# Patient Record
Sex: MALE | Race: WHITE | NOT HISPANIC OR LATINO | Employment: FULL TIME | URBAN - METROPOLITAN AREA
[De-identification: names, ages, dates, MRNs, and addresses within clinical notes are randomized per-mention and may not be internally consistent; named-entity substitution may affect disease eponyms.]

---

## 2017-03-09 DIAGNOSIS — E11.9 DIABETES MELLITUS TYPE 2, CONTROLLED, WITHOUT COMPLICATIONS (HCC): ICD-10-CM

## 2017-03-09 DIAGNOSIS — R79.89 LOW TESTOSTERONE: ICD-10-CM

## 2017-03-09 DIAGNOSIS — E78.5 DYSLIPIDEMIA: ICD-10-CM

## 2017-03-09 DIAGNOSIS — E79.0 HYPERURICEMIA: ICD-10-CM

## 2017-03-09 DIAGNOSIS — E55.9 VITAMIN D DEFICIENCY: ICD-10-CM

## 2017-03-09 DIAGNOSIS — I10 ESSENTIAL HYPERTENSION: ICD-10-CM

## 2017-03-09 RX ORDER — ERGOCALCIFEROL 1.25 MG/1
CAPSULE ORAL
Qty: 13 CAPSULE | Refills: 0 | Status: SHIPPED | OUTPATIENT
Start: 2017-03-09 | End: 2017-06-23 | Stop reason: SDUPTHER

## 2017-06-09 DIAGNOSIS — E55.9 VITAMIN D DEFICIENCY: ICD-10-CM

## 2017-06-09 DIAGNOSIS — I10 ESSENTIAL HYPERTENSION: ICD-10-CM

## 2017-06-09 DIAGNOSIS — E79.0 HYPERURICEMIA: ICD-10-CM

## 2017-06-09 DIAGNOSIS — E11.9 DIABETES MELLITUS TYPE 2, CONTROLLED, WITHOUT COMPLICATIONS (HCC): ICD-10-CM

## 2017-06-09 DIAGNOSIS — R79.89 LOW TESTOSTERONE: ICD-10-CM

## 2017-06-09 DIAGNOSIS — E78.5 DYSLIPIDEMIA: ICD-10-CM

## 2017-06-09 RX ORDER — ERGOCALCIFEROL 1.25 MG/1
CAPSULE ORAL
Qty: 13 CAPSULE | Refills: 0 | OUTPATIENT
Start: 2017-06-09

## 2017-06-23 DIAGNOSIS — I10 ESSENTIAL HYPERTENSION: ICD-10-CM

## 2017-06-23 DIAGNOSIS — E78.5 DYSLIPIDEMIA: ICD-10-CM

## 2017-06-23 DIAGNOSIS — E55.9 VITAMIN D DEFICIENCY: ICD-10-CM

## 2017-06-23 DIAGNOSIS — E11.9 DIABETES MELLITUS TYPE 2, CONTROLLED, WITHOUT COMPLICATIONS (HCC): ICD-10-CM

## 2017-06-23 DIAGNOSIS — R79.89 LOW TESTOSTERONE: ICD-10-CM

## 2017-06-23 DIAGNOSIS — E79.0 HYPERURICEMIA: ICD-10-CM

## 2017-06-24 RX ORDER — ERGOCALCIFEROL 1.25 MG/1
CAPSULE ORAL
Qty: 13 CAPSULE | Refills: 0 | Status: SHIPPED | OUTPATIENT
Start: 2017-06-24 | End: 2019-10-17

## 2017-11-30 ENCOUNTER — OFFICE VISIT (OUTPATIENT)
Dept: FAMILY MEDICINE CLINIC | Facility: CLINIC | Age: 57
End: 2017-11-30

## 2017-11-30 VITALS
HEIGHT: 70 IN | DIASTOLIC BLOOD PRESSURE: 80 MMHG | BODY MASS INDEX: 32.21 KG/M2 | HEART RATE: 92 BPM | RESPIRATION RATE: 18 BRPM | TEMPERATURE: 98 F | WEIGHT: 225 LBS | SYSTOLIC BLOOD PRESSURE: 132 MMHG

## 2017-11-30 DIAGNOSIS — Z79.4 CONTROLLED TYPE 2 DIABETES MELLITUS WITHOUT COMPLICATION, WITH LONG-TERM CURRENT USE OF INSULIN (HCC): ICD-10-CM

## 2017-11-30 DIAGNOSIS — B07.8 OTHER VIRAL WARTS: Primary | ICD-10-CM

## 2017-11-30 DIAGNOSIS — Z00.00 ANNUAL PHYSICAL EXAM: ICD-10-CM

## 2017-11-30 DIAGNOSIS — E11.9 CONTROLLED TYPE 2 DIABETES MELLITUS WITHOUT COMPLICATION, WITH LONG-TERM CURRENT USE OF INSULIN (HCC): ICD-10-CM

## 2017-11-30 PROCEDURE — 99213 OFFICE O/P EST LOW 20 MIN: CPT | Performed by: FAMILY MEDICINE

## 2017-11-30 NOTE — PROGRESS NOTES
"Subjective   Chemo Lin is a 57 y.o. male.     CC: Hand Nodules    History of Present Illness     Pt comes in today c/o bilateral hand nodules. These started 6 months ago or so and are spreading.  Ancillary, his endocrinologist wants me to order the January labs today so that they are back when he sees her.      The following portions of the patient's history were reviewed and updated as appropriate: allergies, current medications, past family history, past medical history, past social history, past surgical history and problem list.    Review of Systems   Constitutional: Negative for activity change, chills, fatigue and fever.   Respiratory: Negative for cough and shortness of breath.    Cardiovascular: Negative for chest pain and palpitations.   Gastrointestinal: Negative for abdominal pain.   Endocrine: Negative for cold intolerance.   Skin:        Skin lesions   Psychiatric/Behavioral: Negative for behavioral problems and dysphoric mood. The patient is not nervous/anxious.      /80  Pulse 92  Temp 98 °F (36.7 °C) (Oral)   Resp 18  Ht 70\" (177.8 cm)  Wt 225 lb (102 kg)  BMI 32.28 kg/m2    Objective   Physical Exam   Constitutional: He appears well-developed and well-nourished.   Neck: Neck supple. No thyromegaly present.   Cardiovascular: Normal rate and regular rhythm.    No murmur heard.  Pulmonary/Chest: Effort normal and breath sounds normal.   Abdominal: Bowel sounds are normal.   Skin:   Several verrucous lesions on the hands noted   Psychiatric: He has a normal mood and affect. His behavior is normal.   Nursing note and vitals reviewed.      Assessment/Plan   Chemo was seen today for hand pain, referreal to surgeon and requesting labs.    Diagnoses and all orders for this visit:    Other viral warts  -     Ambulatory Referral to Dermatology    Annual physical exam  Comments:  for labs only  Orders:  -     Comprehensive metabolic panel  -     Lipid panel  -     CBC and Differential  -  "    TSH  -     PSA    Controlled type 2 diabetes mellitus without complication, with long-term current use of insulin  -     Hemoglobin A1c  -     MicroAlbumin, Urine, Random - Urine, Clean Catch

## 2018-01-16 LAB
ALBUMIN SERPL-MCNC: 4.2 G/DL (ref 3.5–5.2)
ALBUMIN/GLOB SERPL: 1.6 G/DL
ALP SERPL-CCNC: 71 U/L (ref 39–117)
ALT SERPL-CCNC: 55 U/L (ref 1–41)
AST SERPL-CCNC: 34 U/L (ref 1–40)
BASOPHILS # BLD AUTO: 0.02 10*3/MM3 (ref 0–0.2)
BASOPHILS NFR BLD AUTO: 0.4 % (ref 0–1.5)
BILIRUB SERPL-MCNC: 0.7 MG/DL (ref 0.1–1.2)
BUN SERPL-MCNC: 21 MG/DL (ref 6–20)
BUN/CREAT SERPL: 18.1 (ref 7–25)
CALCIUM SERPL-MCNC: 9.3 MG/DL (ref 8.6–10.5)
CHLORIDE SERPL-SCNC: 103 MMOL/L (ref 98–107)
CHOLEST SERPL-MCNC: 191 MG/DL (ref 0–200)
CO2 SERPL-SCNC: 28.9 MMOL/L (ref 22–29)
CREAT SERPL-MCNC: 1.16 MG/DL (ref 0.76–1.27)
EOSINOPHIL # BLD AUTO: 0.26 10*3/MM3 (ref 0–0.7)
EOSINOPHIL NFR BLD AUTO: 4.6 % (ref 0.3–6.2)
ERYTHROCYTE [DISTWIDTH] IN BLOOD BY AUTOMATED COUNT: 13.7 % (ref 11.5–14.5)
GLOBULIN SER CALC-MCNC: 2.7 GM/DL
GLUCOSE SERPL-MCNC: 124 MG/DL (ref 65–99)
HBA1C MFR BLD: 7.3 % (ref 4.8–5.6)
HCT VFR BLD AUTO: 47.2 % (ref 40.4–52.2)
HDLC SERPL-MCNC: 39 MG/DL (ref 40–60)
HGB BLD-MCNC: 15.7 G/DL (ref 13.7–17.6)
IMM GRANULOCYTES # BLD: 0.02 10*3/MM3 (ref 0–0.03)
IMM GRANULOCYTES NFR BLD: 0.4 % (ref 0–0.5)
LDLC SERPL CALC-MCNC: 133 MG/DL (ref 0–100)
LYMPHOCYTES # BLD AUTO: 1.16 10*3/MM3 (ref 0.9–4.8)
LYMPHOCYTES NFR BLD AUTO: 20.4 % (ref 19.6–45.3)
MCH RBC QN AUTO: 31.3 PG (ref 27–32.7)
MCHC RBC AUTO-ENTMCNC: 33.3 G/DL (ref 32.6–36.4)
MCV RBC AUTO: 94 FL (ref 79.8–96.2)
MICROALBUMIN UR-MCNC: 3.7 UG/ML
MONOCYTES # BLD AUTO: 0.7 10*3/MM3 (ref 0.2–1.2)
MONOCYTES NFR BLD AUTO: 12.3 % (ref 5–12)
NEUTROPHILS # BLD AUTO: 3.53 10*3/MM3 (ref 1.9–8.1)
NEUTROPHILS NFR BLD AUTO: 61.9 % (ref 42.7–76)
PLATELET # BLD AUTO: 161 10*3/MM3 (ref 140–500)
POTASSIUM SERPL-SCNC: 4.1 MMOL/L (ref 3.5–5.2)
PROT SERPL-MCNC: 6.9 G/DL (ref 6–8.5)
PSA SERPL-MCNC: 0.58 NG/ML (ref 0–4)
RBC # BLD AUTO: 5.02 10*6/MM3 (ref 4.6–6)
SODIUM SERPL-SCNC: 143 MMOL/L (ref 136–145)
TRIGL SERPL-MCNC: 95 MG/DL (ref 0–150)
TSH SERPL DL<=0.005 MIU/L-ACNC: 2.25 MIU/ML (ref 0.27–4.2)
VLDLC SERPL CALC-MCNC: 19 MG/DL (ref 5–40)
WBC # BLD AUTO: 5.69 10*3/MM3 (ref 4.5–10.7)

## 2018-04-10 ENCOUNTER — OFFICE VISIT (OUTPATIENT)
Dept: FAMILY MEDICINE CLINIC | Facility: CLINIC | Age: 58
End: 2018-04-10

## 2018-04-10 VITALS
RESPIRATION RATE: 16 BRPM | TEMPERATURE: 97.4 F | DIASTOLIC BLOOD PRESSURE: 79 MMHG | HEART RATE: 75 BPM | BODY MASS INDEX: 31.08 KG/M2 | WEIGHT: 222 LBS | SYSTOLIC BLOOD PRESSURE: 138 MMHG | HEIGHT: 71 IN

## 2018-04-10 DIAGNOSIS — J01.00 ACUTE NON-RECURRENT MAXILLARY SINUSITIS: Primary | ICD-10-CM

## 2018-04-10 PROCEDURE — 99213 OFFICE O/P EST LOW 20 MIN: CPT | Performed by: FAMILY MEDICINE

## 2018-04-10 RX ORDER — AMOXICILLIN AND CLAVULANATE POTASSIUM 875; 125 MG/1; MG/1
1 TABLET, FILM COATED ORAL EVERY 12 HOURS SCHEDULED
Qty: 20 TABLET | Refills: 0 | Status: SHIPPED | OUTPATIENT
Start: 2018-04-10 | End: 2019-03-21

## 2018-04-10 NOTE — PROGRESS NOTES
"Subjective   Chemo Lin is a 57 y.o. male.     CC: URI    History of Present Illness     Chemo Lin 57 y.o. male who presents for evaluation of sinus pressure and congestion, cough. Symptoms include congestion and dry cough.  Onset of symptoms was 6 days ago, unchanged since that time. Patient denies shortness of breath, wheezing.   Evaluation to date: none Treatment to date:  none.       The following portions of the patient's history were reviewed and updated as appropriate: allergies, current medications, past family history, past medical history, past social history, past surgical history and problem list.    Review of Systems   Constitutional: Negative for activity change and fatigue.   Respiratory: Negative for chest tightness.    Cardiovascular: Negative for palpitations.   Gastrointestinal: Negative for abdominal pain and nausea.   Endocrine: Negative for cold intolerance and polydipsia.   Psychiatric/Behavioral: Negative for behavioral problems and dysphoric mood.   All other systems reviewed and are negative.    Blood pressure 138/79, pulse 75, temperature 97.4 °F (36.3 °C), temperature source Oral, resp. rate 16, height 180.3 cm (71\"), weight 101 kg (222 lb).      Objective   Physical Exam   Constitutional: He appears well-developed and well-nourished.   HENT:   Right Ear: Tympanic membrane normal.   Left Ear: Tympanic membrane normal.   Nose: Right sinus exhibits maxillary sinus tenderness. Left sinus exhibits maxillary sinus tenderness.   Mouth/Throat: Oropharynx is clear and moist. No oropharyngeal exudate, posterior oropharyngeal erythema or tonsillar abscesses.   Neck: Neck supple. No thyromegaly present.   Cardiovascular: Normal rate and regular rhythm.    No murmur heard.  Pulmonary/Chest: Effort normal and breath sounds normal.   Abdominal: Bowel sounds are normal.   Psychiatric: He has a normal mood and affect. His behavior is normal.   Nursing note and vitals " reviewed.      Assessment/Plan   Chemo was seen today for nasal congestion, sinusitis and cough.    Diagnoses and all orders for this visit:    Acute non-recurrent maxillary sinusitis  -     amoxicillin-clavulanate (AUGMENTIN) 875-125 MG per tablet; Take 1 tablet by mouth Every 12 (Twelve) Hours.

## 2018-04-27 ENCOUNTER — CLINICAL SUPPORT (OUTPATIENT)
Dept: FAMILY MEDICINE CLINIC | Facility: CLINIC | Age: 58
End: 2018-04-27

## 2018-04-27 DIAGNOSIS — Z23 NEED FOR HEPATITIS A VACCINATION: Primary | ICD-10-CM

## 2018-04-27 PROCEDURE — 90632 HEPA VACCINE ADULT IM: CPT | Performed by: INTERNAL MEDICINE

## 2018-04-27 PROCEDURE — 90471 IMMUNIZATION ADMIN: CPT | Performed by: INTERNAL MEDICINE

## 2018-11-02 ENCOUNTER — CLINICAL SUPPORT (OUTPATIENT)
Dept: FAMILY MEDICINE CLINIC | Facility: CLINIC | Age: 58
End: 2018-11-02

## 2018-11-02 DIAGNOSIS — Z23 NEED FOR HEPATITIS A VACCINATION: Primary | ICD-10-CM

## 2018-11-02 PROCEDURE — 90632 HEPA VACCINE ADULT IM: CPT | Performed by: INTERNAL MEDICINE

## 2018-11-02 PROCEDURE — 90471 IMMUNIZATION ADMIN: CPT | Performed by: INTERNAL MEDICINE

## 2019-03-21 ENCOUNTER — OFFICE VISIT (OUTPATIENT)
Dept: FAMILY MEDICINE CLINIC | Facility: CLINIC | Age: 59
End: 2019-03-21

## 2019-03-21 VITALS
WEIGHT: 224 LBS | SYSTOLIC BLOOD PRESSURE: 125 MMHG | HEART RATE: 90 BPM | BODY MASS INDEX: 31.36 KG/M2 | DIASTOLIC BLOOD PRESSURE: 77 MMHG | OXYGEN SATURATION: 96 % | HEIGHT: 71 IN | RESPIRATION RATE: 18 BRPM | TEMPERATURE: 98.3 F

## 2019-03-21 DIAGNOSIS — J06.9 ACUTE URI: Primary | ICD-10-CM

## 2019-03-21 PROBLEM — N52.9 ED (ERECTILE DYSFUNCTION): Status: ACTIVE | Noted: 2019-02-22

## 2019-03-21 PROCEDURE — 99213 OFFICE O/P EST LOW 20 MIN: CPT | Performed by: FAMILY MEDICINE

## 2019-03-21 RX ORDER — VALSARTAN 40 MG/1
TABLET ORAL
COMMUNITY
Start: 2019-02-04

## 2019-03-21 RX ORDER — INSULIN DEGLUDEC INJECTION 100 U/ML
INJECTION, SOLUTION SUBCUTANEOUS
Refills: 0 | COMMUNITY
Start: 2019-03-01 | End: 2020-02-27

## 2019-03-21 RX ORDER — EZETIMIBE 10 MG/1
10 TABLET ORAL DAILY
COMMUNITY
End: 2022-12-22 | Stop reason: SDUPTHER

## 2019-03-21 RX ORDER — CHLORTHALIDONE 25 MG/1
25 TABLET ORAL DAILY
Refills: 0 | COMMUNITY
Start: 2018-12-19 | End: 2023-01-05

## 2019-03-21 RX ORDER — DEXTROMETHORPHAN HYDROBROMIDE AND PROMETHAZINE HYDROCHLORIDE 15; 6.25 MG/5ML; MG/5ML
SYRUP ORAL
Qty: 100 ML | Refills: 0 | Status: SHIPPED | OUTPATIENT
Start: 2019-03-21 | End: 2019-04-11

## 2019-03-21 NOTE — PROGRESS NOTES
"Subjective   Chemo Lin is a 58 y.o. male.     CC: URI    History of Present Illness     Chemo Lin 58 y.o. male who presents for evaluation of cough. Symptoms include a \"tickel cough\" in the throat area that is mainly at night supine. Did have some wheezing last week but is gone now..  Onset of symptoms was 1 week ago, gradually improving since that time. Patient denies hemoptysis.   Evaluation to date: none Treatment to date:  nasal steroid sprary.       The following portions of the patient's history were reviewed and updated as appropriate: allergies, current medications, past family history, past medical history, past social history, past surgical history and problem list.    Review of Systems   Constitutional: Negative for activity change, chills, fatigue and fever.   Respiratory: Positive for cough. Negative for shortness of breath.    Cardiovascular: Negative for chest pain and palpitations.   Gastrointestinal: Negative for abdominal pain.   Endocrine: Negative for cold intolerance.   Psychiatric/Behavioral: Negative for behavioral problems and dysphoric mood. The patient is not nervous/anxious.      /77   Pulse 90   Temp 98.3 °F (36.8 °C) (Oral)   Resp 18   Ht 180.3 cm (71\")   Wt 102 kg (224 lb)   SpO2 96%   BMI 31.24 kg/m²     Objective   Physical Exam   Constitutional: He appears well-developed and well-nourished.   HENT:   Right Ear: Tympanic membrane normal.   Left Ear: Tympanic membrane normal.   Nose: Right sinus exhibits no maxillary sinus tenderness and no frontal sinus tenderness. Left sinus exhibits no maxillary sinus tenderness and no frontal sinus tenderness.   Mouth/Throat: Oropharynx is clear and moist. No posterior oropharyngeal erythema.   Neck: Neck supple. No thyromegaly present.   Cardiovascular: Normal rate and regular rhythm.   No murmur heard.  Pulmonary/Chest: Effort normal and breath sounds normal.   Abdominal: Bowel sounds are normal. There is no " tenderness.   Psychiatric: He has a normal mood and affect. His behavior is normal.   Nursing note and vitals reviewed.      Assessment/Plan   Chemo was seen today for cough, nasal congestion and sinusitis.    Diagnoses and all orders for this visit:    Acute URI  Comments:  improving  Orders:  -     promethazine-dextromethorphan (PROMETHAZINE-DM) 6.25-15 MG/5ML syrup; Take 1-2 tsp (5-10ml) qhs prn cough

## 2019-03-25 ENCOUNTER — TELEPHONE (OUTPATIENT)
Dept: FAMILY MEDICINE CLINIC | Facility: CLINIC | Age: 59
End: 2019-03-25

## 2019-03-25 RX ORDER — AMOXICILLIN AND CLAVULANATE POTASSIUM 875; 125 MG/1; MG/1
1 TABLET, FILM COATED ORAL EVERY 12 HOURS SCHEDULED
Qty: 20 TABLET | Refills: 0 | Status: SHIPPED | OUTPATIENT
Start: 2019-03-25 | End: 2019-04-11

## 2019-04-11 ENCOUNTER — OFFICE VISIT (OUTPATIENT)
Dept: FAMILY MEDICINE CLINIC | Facility: CLINIC | Age: 59
End: 2019-04-11

## 2019-04-11 VITALS
HEIGHT: 71 IN | SYSTOLIC BLOOD PRESSURE: 135 MMHG | BODY MASS INDEX: 30.8 KG/M2 | DIASTOLIC BLOOD PRESSURE: 87 MMHG | TEMPERATURE: 98 F | WEIGHT: 220 LBS | RESPIRATION RATE: 16 BRPM | HEART RATE: 69 BPM

## 2019-04-11 DIAGNOSIS — H66.90 ACUTE OTITIS MEDIA, UNSPECIFIED OTITIS MEDIA TYPE: Primary | ICD-10-CM

## 2019-04-11 DIAGNOSIS — R23.8 PAPULE: ICD-10-CM

## 2019-04-11 PROCEDURE — 99213 OFFICE O/P EST LOW 20 MIN: CPT | Performed by: FAMILY MEDICINE

## 2019-04-11 RX ORDER — PREDNISONE 20 MG/1
20 TABLET ORAL 2 TIMES DAILY
Qty: 10 TABLET | Refills: 0 | Status: SHIPPED | OUTPATIENT
Start: 2019-04-11 | End: 2019-04-16

## 2019-04-11 RX ORDER — LEVOFLOXACIN 500 MG/1
500 TABLET, FILM COATED ORAL DAILY
Qty: 10 TABLET | Refills: 0 | Status: SHIPPED | OUTPATIENT
Start: 2019-04-11 | End: 2019-04-21

## 2019-04-11 NOTE — PROGRESS NOTES
"Subjective   Chemo Lin is a 58 y.o. male.     CC: URI    History of Present Illness     Chemo Lin 58 y.o. male who presents for evaluation of cough, ear pressure. Symptoms include congestion.  Onset of symptoms was a few weeks ago, unchanged since that time. Patient denies hemoptysis.   Evaluation to date: Augmentin and Cough Syrup. Treatment to date:  none.   Denies AM hoarseness or loss voice.    The following portions of the patient's history were reviewed and updated as appropriate: allergies, current medications, past family history, past medical history, past social history, past surgical history and problem list.    Review of Systems   Constitutional: Negative for activity change, chills, fatigue and fever.   HENT: Positive for ear pain (right). Negative for sinus pain.    Respiratory: Positive for cough (more in the lower throat). Negative for shortness of breath.    Cardiovascular: Negative for chest pain and palpitations.   Gastrointestinal: Negative for abdominal pain.   Endocrine: Negative for cold intolerance.   Psychiatric/Behavioral: Negative for behavioral problems and dysphoric mood. The patient is not nervous/anxious.        /87   Pulse 69   Temp 98 °F (36.7 °C) (Oral)   Resp 16   Ht 180.3 cm (71\")   Wt 99.8 kg (220 lb)   BMI 30.68 kg/m²     Objective   Physical Exam   Constitutional: He appears well-developed and well-nourished.   HENT:   Right Ear: Tympanic membrane is injected.   Left Ear: Tympanic membrane normal.   Neck: Neck supple. No thyromegaly present.   Cardiovascular: Normal rate and regular rhythm.   No murmur heard.  Pulmonary/Chest: Effort normal and breath sounds normal.   Abdominal: Bowel sounds are normal. There is no tenderness.   Psychiatric: He has a normal mood and affect. His behavior is normal.   Nursing note and vitals reviewed.      Assessment/Plan   Chemo was seen today for earache and cough.    Diagnoses and all orders for this " visit:    Acute otitis media, unspecified otitis media type  -     levoFLOXacin (LEVAQUIN) 500 MG tablet; Take 1 tablet by mouth Daily for 10 days.  -     predniSONE (DELTASONE) 20 MG tablet; Take 1 tablet by mouth 2 (Two) Times a Day for 5 days.    Papule  -     Ambulatory Referral to Dermatology

## 2019-10-17 ENCOUNTER — OFFICE VISIT (OUTPATIENT)
Dept: CARDIOLOGY | Facility: CLINIC | Age: 59
End: 2019-10-17

## 2019-10-17 VITALS
BODY MASS INDEX: 31.5 KG/M2 | DIASTOLIC BLOOD PRESSURE: 72 MMHG | HEIGHT: 71 IN | OXYGEN SATURATION: 94 % | WEIGHT: 225 LBS | SYSTOLIC BLOOD PRESSURE: 110 MMHG | HEART RATE: 81 BPM

## 2019-10-17 DIAGNOSIS — Z82.49 FAMILY HISTORY OF PREMATURE CORONARY HEART DISEASE: ICD-10-CM

## 2019-10-17 DIAGNOSIS — E11.9 CONTROLLED TYPE 2 DIABETES MELLITUS WITHOUT COMPLICATION, WITH LONG-TERM CURRENT USE OF INSULIN (HCC): ICD-10-CM

## 2019-10-17 DIAGNOSIS — E78.5 DYSLIPIDEMIA: ICD-10-CM

## 2019-10-17 DIAGNOSIS — Z79.4 CONTROLLED TYPE 2 DIABETES MELLITUS WITHOUT COMPLICATION, WITH LONG-TERM CURRENT USE OF INSULIN (HCC): ICD-10-CM

## 2019-10-17 DIAGNOSIS — R06.89 RESPIRATORY INSUFFICIENCY: ICD-10-CM

## 2019-10-17 DIAGNOSIS — Z95.5 HISTORY OF CORONARY ARTERY STENT PLACEMENT: ICD-10-CM

## 2019-10-17 DIAGNOSIS — I10 ESSENTIAL HYPERTENSION: ICD-10-CM

## 2019-10-17 DIAGNOSIS — I25.10 CORONARY ARTERY DISEASE INVOLVING NATIVE CORONARY ARTERY OF NATIVE HEART WITHOUT ANGINA PECTORIS: Primary | ICD-10-CM

## 2019-10-17 PROCEDURE — 99204 OFFICE O/P NEW MOD 45 MIN: CPT | Performed by: INTERNAL MEDICINE

## 2019-10-17 RX ORDER — CETIRIZINE HYDROCHLORIDE 5 MG/1
5 TABLET ORAL AS NEEDED
COMMUNITY

## 2019-10-17 RX ORDER — SILDENAFIL 100 MG/1
100 TABLET, FILM COATED ORAL DAILY PRN
Refills: 1 | COMMUNITY
Start: 2019-07-15 | End: 2020-04-16

## 2019-10-17 RX ORDER — GLIPIZIDE 10 MG/1
10 TABLET, FILM COATED, EXTENDED RELEASE ORAL DAILY
Refills: 0 | COMMUNITY
Start: 2019-09-09 | End: 2023-01-05

## 2019-10-17 RX ORDER — EMPAGLIFLOZIN 10 MG/1
1 TABLET, FILM COATED ORAL DAILY
Refills: 0 | COMMUNITY
Start: 2019-09-09 | End: 2023-01-05

## 2019-10-17 NOTE — PROGRESS NOTES
[facility]        Subjective:     Encounter Date:10/17/2019      Patient ID: Chemo Lin is a 59 y.o. male.    Chief Complaint:  No chief complaint on file.      HPI: I saw Chemo Lin today for cardiovascular evaluation.  He is a pleasant 59-year-old male who has remained active.  He does have a prior history of coronary heart disease and reports undergoing coronary stent deployment to the circumflex in 2005 at [facility].  He denies any distinct chest pain pressure tightness.  He is free of left arm neck or jaw discomfort.  He does report some dyspnea on exertion but is free of orthopnea or PND.  He denies syncope near syncope or palpitation.    Cardiac risk factors are positive for hypertension, type 2 diabetes, dyslipidemia and a family history of premature coronary heart disease.    He remains on rosuvastatin 40 mg daily and Zetia 10 mg a day.  His fasting lipids and blood  glucose is monitored by his endocrinologist.      The following portions of the patient's history were reviewed and updated as appropriate: allergies, current medications, past family history, past medical history, past social history, past surgical history and problem list.    Problem List:  Patient Active Problem List   Diagnosis   • Hyperuricemia   • Low testosterone   • Essential hypertension   • Vitamin D deficiency   • Dyslipidemia   • Diabetes mellitus type 2, controlled, without complications (CMS/[facility])   • ED (erectile dysfunction)   • Coronary artery disease involving native coronary artery of native heart without angina pectoris   • History of coronary artery stent placement   • Respiratory insufficiency   • Family history of premature coronary heart disease       Past Medical History:  Past Medical History:   Diagnosis Date   • Allergic rhinitis    • Allergic rhinitis    • Coronary artery disease    • Diabetes mellitus, controlled (CMS/HCC)    • Diabetes mellitus, controlled (CMS/HCC)    •  Hyperlipidemia    • Hypertension     type 2 diabetes mellitus, uncontrolled   • Tendinitis    • Tendinitis    • Type 2 diabetes mellitus (CMS/HCC)    • Type 2 diabetes mellitus (CMS/HCC)        Past Surgical History:  Past Surgical History:   Procedure Laterality Date   • COLONOSCOPY     • CORONARY ANGIOPLASTY WITH STENT PLACEMENT     • FOOT SURGERY Right        Social History:  Social History     Socioeconomic History   • Marital status:      Spouse name: Not on file   • Number of children: Not on file   • Years of education: Not on file   • Highest education level: Not on file   Tobacco Use   • Smoking status: Never Smoker   • Smokeless tobacco: Never Used   Substance and Sexual Activity   • Alcohol use: No       Allergies:  Allergies   Allergen Reactions   • Metformin And Related Other (See Comments)     Edema          ROS:  Review of Systems   Constitution: Negative for weakness and malaise/fatigue.   HENT: Negative for hearing loss and nosebleeds.    Eyes: Negative for double vision and visual disturbance.   Cardiovascular: Positive for dyspnea on exertion. Negative for chest pain, claudication, near-syncope, orthopnea, palpitations, paroxysmal nocturnal dyspnea and syncope.   Respiratory: Negative for cough, shortness of breath, sleep disturbances due to breathing, snoring, sputum production and wheezing.    Endocrine: Negative for cold intolerance, heat intolerance, polydipsia and polyuria.   Hematologic/Lymphatic: Negative for adenopathy and bleeding problem. Does not bruise/bleed easily.   Skin: Negative for flushing and itching.   Musculoskeletal: Negative for back pain, falls, joint pain, joint swelling, muscle weakness and neck pain.   Gastrointestinal: Negative for abdominal pain, dysphagia, heartburn, nausea and vomiting.   Genitourinary: Negative for dysuria and frequency.   Neurological: Negative for disturbances in coordination, dizziness, light-headedness, loss of balance and numbness.  "  Psychiatric/Behavioral: Negative for altered mental status and memory loss. The patient is not nervous/anxious.    Allergic/Immunologic: Negative for hives and persistent infections.          Objective:         /72 (BP Location: Left arm, Patient Position: Sitting, Cuff Size: Adult)   Pulse 81   Ht 180.3 cm (71\")   Wt 102 kg (225 lb)   SpO2 94%   BMI 31.38 kg/m²     Physical Exam   Constitutional: He is oriented to person, place, and time. He appears well-developed and well-nourished.   HENT:   Head: Normocephalic and atraumatic.   Eyes: Conjunctivae and EOM are normal. Pupils are equal, round, and reactive to light.   Neck: Neck supple. No thyromegaly present.   Cardiovascular: Normal rate, regular rhythm, S1 normal, S2 normal and intact distal pulses. PMI is not displaced. Exam reveals gallop and S4. Exam reveals no S3, no distant heart sounds, no friction rub, no midsystolic click and no opening snap.   No murmur heard.  Pulses:       Carotid pulses are 2+ on the right side, and 2+ on the left side.       Radial pulses are 2+ on the right side, and 2+ on the left side.        Femoral pulses are 2+ on the right side, and 2+ on the left side.       Dorsalis pedis pulses are 2+ on the right side, and 2+ on the left side.        Posterior tibial pulses are 2+ on the right side, and 2+ on the left side.   Pulmonary/Chest: Effort normal and breath sounds normal. No respiratory distress. He has no wheezes. He has no rales.   Abdominal: Soft. Bowel sounds are normal. He exhibits no distension and no mass. There is no tenderness.   Musculoskeletal: Normal range of motion. He exhibits no edema.   Neurological: He is alert and oriented to person, place, and time.   Skin: Skin is warm and dry. No erythema.   Psychiatric: He has a normal mood and affect.       In-Office Procedure(s):  Procedures    ASCVD RIsk Score::  The 10-year ASCVD risk score (Sonu DICK Jr., et al., 2013) is: 13.4%    Values used to calculate " the score:      Age: 59 years      Sex: Male      Is Non- : No      Diabetic: Yes      Tobacco smoker: No      Systolic Blood Pressure: 110 mmHg      Is BP treated: Yes      HDL Cholesterol: 35 mg/dL      Total Cholesterol: 153 mg/dL    Recent Radiology:  Imaging Results (most recent)     None              Assessment/Plan:         1. Coronary artery disease involving native coronary artery of native heart without angina pectoris  Stable    2. History of coronary artery stent placement  Stable    3. Essential hypertension  Controlled    4. Respiratory insufficiency  May represent anginal equivalent    5. Dyslipidemia  Continue rosuvastatin and Zetia, low-cholesterol low saturated fat diet    6. Controlled type 2 diabetes mellitus without complication, with long-term current use of insulin (CMS/[facility])  Continue current regimen oral agent and insulin with diet    7. Family history of premature coronary heart disease      Mr. Lin is free of overt angina and appears euvolemic.  He maintains a good activity level.  He does report some dyspnea on exertion.  We will obtain an echocardiogram and have him undergo stress Cardiolite noninvasive ischemic assessment.  He has a history of coronary heart disease status post circumflex stent deployment in 2005.  We will tentatively plan to see him in follow-up in 6 months pending the results of the study.  We have stressed the importance of continued risk modification.           Cedric Gurrola MD  10/17/19  .

## 2019-11-26 ENCOUNTER — HOSPITAL ENCOUNTER (OUTPATIENT)
Dept: NUCLEAR MEDICINE | Facility: HOSPITAL | Age: 59
Discharge: HOME | End: 2019-11-26

## 2019-11-26 ENCOUNTER — HOSPITAL ENCOUNTER (OUTPATIENT)
Dept: CARDIOLOGY | Facility: HOSPITAL | Age: 59
Discharge: HOME | End: 2019-11-26
Admitting: INTERNAL MEDICINE

## 2019-11-26 VITALS
BODY MASS INDEX: 31.5 KG/M2 | HEIGHT: 71 IN | HEART RATE: 60 BPM | SYSTOLIC BLOOD PRESSURE: 111 MMHG | DIASTOLIC BLOOD PRESSURE: 81 MMHG | WEIGHT: 225 LBS

## 2019-11-26 DIAGNOSIS — R06.89 RESPIRATORY INSUFFICIENCY: ICD-10-CM

## 2019-11-26 DIAGNOSIS — Z95.5 HISTORY OF CORONARY ARTERY STENT PLACEMENT: ICD-10-CM

## 2019-11-26 DIAGNOSIS — E78.5 DYSLIPIDEMIA: ICD-10-CM

## 2019-11-26 DIAGNOSIS — I10 ESSENTIAL HYPERTENSION: ICD-10-CM

## 2019-11-26 DIAGNOSIS — Z79.4 CONTROLLED TYPE 2 DIABETES MELLITUS WITHOUT COMPLICATION, WITH LONG-TERM CURRENT USE OF INSULIN (HCC): ICD-10-CM

## 2019-11-26 DIAGNOSIS — E11.9 CONTROLLED TYPE 2 DIABETES MELLITUS WITHOUT COMPLICATION, WITH LONG-TERM CURRENT USE OF INSULIN (HCC): ICD-10-CM

## 2019-11-26 DIAGNOSIS — Z82.49 FAMILY HISTORY OF PREMATURE CORONARY HEART DISEASE: ICD-10-CM

## 2019-11-26 DIAGNOSIS — I25.10 CORONARY ARTERY DISEASE INVOLVING NATIVE CORONARY ARTERY OF NATIVE HEART WITHOUT ANGINA PECTORIS: ICD-10-CM

## 2019-11-26 LAB
BH CV STRESS BP STAGE 1: NORMAL
BH CV STRESS BP STAGE 2: NORMAL
BH CV STRESS BP STAGE 3: NORMAL
BH CV STRESS DURATION MIN STAGE 1: 3
BH CV STRESS DURATION MIN STAGE 2: 3
BH CV STRESS DURATION MIN STAGE 3: 3
BH CV STRESS DURATION SEC STAGE 1: 0
BH CV STRESS DURATION SEC STAGE 2: 0
BH CV STRESS DURATION SEC STAGE 3: 0
BH CV STRESS GRADE STAGE 1: 10
BH CV STRESS GRADE STAGE 2: 12
BH CV STRESS GRADE STAGE 3: 14
BH CV STRESS HR STAGE 1: 101
BH CV STRESS HR STAGE 2: 120
BH CV STRESS HR STAGE 3: 139
BH CV STRESS METS STAGE 1: 5
BH CV STRESS METS STAGE 2: 7.5
BH CV STRESS METS STAGE 3: 10
BH CV STRESS PROTOCOL 1: NORMAL
BH CV STRESS RECOVERY BP: NORMAL MMHG
BH CV STRESS RECOVERY HR: 92 BPM
BH CV STRESS SPEED STAGE 1: 1.7
BH CV STRESS SPEED STAGE 2: 2.5
BH CV STRESS SPEED STAGE 3: 3.4
BH CV STRESS STAGE 1: 1
BH CV STRESS STAGE 2: 2
BH CV STRESS STAGE 3: 3
LV EF NUC BP: 64 %
MAXIMAL PREDICTED HEART RATE: 161 BPM
PERCENT MAX PREDICTED HR: 86.34 %
STRESS BASELINE BP: NORMAL MMHG
STRESS BASELINE HR: 60 BPM
STRESS PERCENT HR: 102 %
STRESS POST ESTIMATED WORKLOAD: 10.2 METS
STRESS POST EXERCISE DUR MIN: 9 MIN
STRESS POST EXERCISE DUR SEC: 0 SEC
STRESS POST PEAK BP: NORMAL MMHG
STRESS POST PEAK HR: 139 BPM
STRESS TARGET HR: 137 BPM

## 2019-11-26 PROCEDURE — 78452 HT MUSCLE IMAGE SPECT MULT: CPT

## 2019-11-26 PROCEDURE — 93017 CV STRESS TEST TRACING ONLY: CPT

## 2019-11-26 PROCEDURE — 0 TECHNETIUM SESTAMIBI: Performed by: INTERNAL MEDICINE

## 2019-11-26 PROCEDURE — 93018 CV STRESS TEST I&R ONLY: CPT | Performed by: INTERNAL MEDICINE

## 2019-11-26 PROCEDURE — A9500 TC99M SESTAMIBI: HCPCS | Performed by: INTERNAL MEDICINE

## 2019-11-26 PROCEDURE — 78452 HT MUSCLE IMAGE SPECT MULT: CPT | Performed by: INTERNAL MEDICINE

## 2019-11-26 PROCEDURE — 93306 TTE W/DOPPLER COMPLETE: CPT

## 2019-11-26 RX ADMIN — TECHNETIUM TC 99M SESTAMIBI 1 DOSE: 1 INJECTION INTRAVENOUS at 08:05

## 2019-11-26 RX ADMIN — TECHNETIUM TC 99M SESTAMIBI 1 DOSE: 1 INJECTION INTRAVENOUS at 10:05

## 2019-11-27 LAB
BH CV ECHO MEAS - ACS: 2.3 CM
BH CV ECHO MEAS - AO MAX PG (FULL): 1.2 MMHG
BH CV ECHO MEAS - AO MAX PG: 4.5 MMHG
BH CV ECHO MEAS - AO MEAN PG (FULL): 1 MMHG
BH CV ECHO MEAS - AO MEAN PG: 3 MMHG
BH CV ECHO MEAS - AO ROOT AREA (BSA CORRECTED): 1.5
BH CV ECHO MEAS - AO ROOT AREA: 8.6 CM^2
BH CV ECHO MEAS - AO ROOT DIAM: 3.3 CM
BH CV ECHO MEAS - AO V2 MAX: 106 CM/SEC
BH CV ECHO MEAS - AO V2 MEAN: 76.8 CM/SEC
BH CV ECHO MEAS - AO V2 VTI: 23 CM
BH CV ECHO MEAS - AVA(I,A): 3.6 CM^2
BH CV ECHO MEAS - AVA(I,D): 3.6 CM^2
BH CV ECHO MEAS - AVA(V,A): 3.3 CM^2
BH CV ECHO MEAS - AVA(V,D): 3.3 CM^2
BH CV ECHO MEAS - BSA(HAYCOCK): 2.3 M^2
BH CV ECHO MEAS - BSA: 2.2 M^2
BH CV ECHO MEAS - BZI_BMI: 31.4 KILOGRAMS/M^2
BH CV ECHO MEAS - BZI_METRIC_HEIGHT: 180.3 CM
BH CV ECHO MEAS - BZI_METRIC_WEIGHT: 102.1 KG
BH CV ECHO MEAS - EDV(CUBED): 110.6 ML
BH CV ECHO MEAS - EDV(MOD-SP2): 86 ML
BH CV ECHO MEAS - EDV(MOD-SP4): 114 ML
BH CV ECHO MEAS - EDV(TEICH): 107.5 ML
BH CV ECHO MEAS - EF(CUBED): 61.2 %
BH CV ECHO MEAS - EF(MOD-BP): 63 %
BH CV ECHO MEAS - EF(MOD-SP2): 65.1 %
BH CV ECHO MEAS - EF(MOD-SP4): 61.4 %
BH CV ECHO MEAS - EF(TEICH): 52.7 %
BH CV ECHO MEAS - ESV(CUBED): 42.9 ML
BH CV ECHO MEAS - ESV(MOD-SP2): 30 ML
BH CV ECHO MEAS - ESV(MOD-SP4): 44 ML
BH CV ECHO MEAS - ESV(TEICH): 50.9 ML
BH CV ECHO MEAS - FS: 27.1 %
BH CV ECHO MEAS - IVS/LVPW: 1
BH CV ECHO MEAS - IVSD: 1 CM
BH CV ECHO MEAS - LAT PEAK E' VEL: 12.9 CM/SEC
BH CV ECHO MEAS - LV DIASTOLIC VOL/BSA (35-75): 51.4 ML/M^2
BH CV ECHO MEAS - LV MASS(C)D: 170.2 GRAMS
BH CV ECHO MEAS - LV MASS(C)DI: 76.8 GRAMS/M^2
BH CV ECHO MEAS - LV MAX PG: 3.3 MMHG
BH CV ECHO MEAS - LV MEAN PG: 2 MMHG
BH CV ECHO MEAS - LV SYSTOLIC VOL/BSA (12-30): 19.8 ML/M^2
BH CV ECHO MEAS - LV V1 MAX: 90.9 CM/SEC
BH CV ECHO MEAS - LV V1 MEAN: 64.6 CM/SEC
BH CV ECHO MEAS - LV V1 VTI: 21.6 CM
BH CV ECHO MEAS - LVIDD: 4.8 CM
BH CV ECHO MEAS - LVIDS: 3.5 CM
BH CV ECHO MEAS - LVLD AP2: 8.4 CM
BH CV ECHO MEAS - LVLD AP4: 8 CM
BH CV ECHO MEAS - LVLS AP2: 6.4 CM
BH CV ECHO MEAS - LVLS AP4: 7.1 CM
BH CV ECHO MEAS - LVOT AREA (M): 3.8 CM^2
BH CV ECHO MEAS - LVOT AREA: 3.8 CM^2
BH CV ECHO MEAS - LVOT DIAM: 2.2 CM
BH CV ECHO MEAS - LVPWD: 1 CM
BH CV ECHO MEAS - MED PEAK E' VEL: 8.3 CM/SEC
BH CV ECHO MEAS - MV A DUR: 0.2 SEC
BH CV ECHO MEAS - MV A MAX VEL: 68.4 CM/SEC
BH CV ECHO MEAS - MV DEC SLOPE: 423 CM/SEC^2
BH CV ECHO MEAS - MV DEC TIME: 0.21 SEC
BH CV ECHO MEAS - MV E MAX VEL: 81 CM/SEC
BH CV ECHO MEAS - MV E/A: 1.2
BH CV ECHO MEAS - MV MAX PG: 4.8 MMHG
BH CV ECHO MEAS - MV MEAN PG: 2 MMHG
BH CV ECHO MEAS - MV P1/2T MAX VEL: 106 CM/SEC
BH CV ECHO MEAS - MV P1/2T: 73.4 MSEC
BH CV ECHO MEAS - MV V2 MAX: 110 CM/SEC
BH CV ECHO MEAS - MV V2 MEAN: 61.2 CM/SEC
BH CV ECHO MEAS - MV V2 VTI: 28.7 CM
BH CV ECHO MEAS - MVA P1/2T LCG: 2.1 CM^2
BH CV ECHO MEAS - MVA(P1/2T): 3 CM^2
BH CV ECHO MEAS - MVA(VTI): 2.9 CM^2
BH CV ECHO MEAS - PA ACC TIME: 0.11 SEC
BH CV ECHO MEAS - PA MAX PG (FULL): 3.7 MMHG
BH CV ECHO MEAS - PA MAX PG: 4.8 MMHG
BH CV ECHO MEAS - PA PR(ACCEL): 30 MMHG
BH CV ECHO MEAS - PA V2 MAX: 110 CM/SEC
BH CV ECHO MEAS - PULM A REVS DUR: 0.14 SEC
BH CV ECHO MEAS - PULM A REVS VEL: 24.8 CM/SEC
BH CV ECHO MEAS - PULM DIAS VEL: 51.9 CM/SEC
BH CV ECHO MEAS - PULM S/D: 1
BH CV ECHO MEAS - PULM SYS VEL: 52.7 CM/SEC
BH CV ECHO MEAS - PVA(V,A): 1.7 CM^2
BH CV ECHO MEAS - PVA(V,D): 1.7 CM^2
BH CV ECHO MEAS - QP/QS: 0.51
BH CV ECHO MEAS - RAP SYSTOLE: 3 MMHG
BH CV ECHO MEAS - RV MAX PG: 1.1 MMHG
BH CV ECHO MEAS - RV MEAN PG: 1 MMHG
BH CV ECHO MEAS - RV V1 MAX: 53.2 CM/SEC
BH CV ECHO MEAS - RV V1 MEAN: 41.5 CM/SEC
BH CV ECHO MEAS - RV V1 VTI: 12 CM
BH CV ECHO MEAS - RVOT AREA: 3.5 CM^2
BH CV ECHO MEAS - RVOT DIAM: 2.1 CM
BH CV ECHO MEAS - RVSP: 31 MMHG
BH CV ECHO MEAS - SI(AO): 88.7 ML/M^2
BH CV ECHO MEAS - SI(CUBED): 30.5 ML/M^2
BH CV ECHO MEAS - SI(LVOT): 37 ML/M^2
BH CV ECHO MEAS - SI(MOD-SP2): 25.3 ML/M^2
BH CV ECHO MEAS - SI(MOD-SP4): 31.6 ML/M^2
BH CV ECHO MEAS - SI(TEICH): 25.6 ML/M^2
BH CV ECHO MEAS - SV(AO): 196.7 ML
BH CV ECHO MEAS - SV(CUBED): 67.7 ML
BH CV ECHO MEAS - SV(LVOT): 82.1 ML
BH CV ECHO MEAS - SV(MOD-SP2): 56 ML
BH CV ECHO MEAS - SV(MOD-SP4): 70 ML
BH CV ECHO MEAS - SV(RVOT): 41.6 ML
BH CV ECHO MEAS - SV(TEICH): 56.7 ML
BH CV ECHO MEAS - TAPSE (>1.6): 2.3 CM2
BH CV ECHO MEAS - TR MAX VEL: 262 CM/SEC
BH CV ECHO MEASUREMENTS AVERAGE E/E' RATIO: 7.64
BH CV XLRA - RV BASE: 3.1 CM
BH CV XLRA - RV LENGTH: 7.1 CM
BH CV XLRA - RV MID: 2.8 CM
BH CV XLRA - TDI S': 14.7 CM/SEC
LEFT ATRIUM VOLUME INDEX: 21 ML/M2
MAXIMAL PREDICTED HEART RATE: 161 BPM
STRESS TARGET HR: 137 BPM

## 2019-11-27 PROCEDURE — 93306 TTE W/DOPPLER COMPLETE: CPT | Performed by: INTERNAL MEDICINE

## 2020-02-27 ENCOUNTER — OFFICE VISIT (OUTPATIENT)
Dept: FAMILY MEDICINE CLINIC | Facility: CLINIC | Age: 60
End: 2020-02-27

## 2020-02-27 VITALS
DIASTOLIC BLOOD PRESSURE: 77 MMHG | HEART RATE: 80 BPM | BODY MASS INDEX: 30.94 KG/M2 | OXYGEN SATURATION: 95 % | SYSTOLIC BLOOD PRESSURE: 114 MMHG | RESPIRATION RATE: 16 BRPM | TEMPERATURE: 97.5 F | WEIGHT: 221 LBS | HEIGHT: 71 IN

## 2020-02-27 DIAGNOSIS — J01.00 ACUTE NON-RECURRENT MAXILLARY SINUSITIS: Primary | ICD-10-CM

## 2020-02-27 DIAGNOSIS — M19.049 HAND ARTHRITIS: ICD-10-CM

## 2020-02-27 PROCEDURE — 99213 OFFICE O/P EST LOW 20 MIN: CPT | Performed by: FAMILY MEDICINE

## 2020-02-27 RX ORDER — AMOXICILLIN AND CLAVULANATE POTASSIUM 875; 125 MG/1; MG/1
1 TABLET, FILM COATED ORAL EVERY 12 HOURS SCHEDULED
Qty: 20 TABLET | Refills: 0 | Status: SHIPPED | OUTPATIENT
Start: 2020-02-27 | End: 2020-04-16

## 2020-02-27 RX ORDER — INSULIN GLARGINE 300 U/ML
INJECTION, SOLUTION SUBCUTANEOUS
COMMUNITY
Start: 2020-02-17 | End: 2022-02-16

## 2020-02-27 NOTE — PROGRESS NOTES
"Subjective   Chemo Lin is a 59 y.o. male.     CC: URI    History of Present Illness     Chemo Lin 59 y.o. male who presents for evaluation of sinus pressure and congestion, cough. Symptoms include congestion, post nasal drip and productive cough.  Onset of symptoms was 1 week ago, unchanged since that time. Patient denies wheezing, hemoptysis.   Evaluation to date: none Treatment to date:  nasal steroid sprary.     Off topic, pt reports a many year h/o slowly worsening bilateral knuckle pain, worse at work physical job). Uses Aleve sparingly and Tylenol does help some, too.     The following portions of the patient's history were reviewed and updated as appropriate: allergies, current medications, past family history, past medical history, past social history, past surgical history and problem list.    Review of Systems   Constitutional: Negative for activity change, chills, fatigue and fever.   HENT: Positive for congestion, postnasal drip and sinus pressure.    Respiratory: Positive for cough. Negative for chest tightness.    Cardiovascular: Negative for chest pain and palpitations.   Gastrointestinal: Negative for abdominal pain and nausea.   Endocrine: Negative for cold intolerance and polydipsia.   Psychiatric/Behavioral: Negative for behavioral problems and dysphoric mood.   All other systems reviewed and are negative.      /77   Pulse 80   Temp 97.5 °F (36.4 °C) (Oral)   Resp 16   Ht 180.3 cm (71\")   Wt 100 kg (221 lb)   SpO2 95%   BMI 30.82 kg/m²     Objective   Physical Exam   Constitutional: He appears well-developed and well-nourished.   HENT:   Right Ear: Tympanic membrane normal.   Left Ear: Tympanic membrane normal.   Nose: Right sinus exhibits no maxillary sinus tenderness and no frontal sinus tenderness. Left sinus exhibits no maxillary sinus tenderness and no frontal sinus tenderness.   Mouth/Throat: Uvula is midline and oropharynx is clear and moist. No oropharyngeal " exudate or posterior oropharyngeal erythema.   Neck: Neck supple. No thyromegaly present.   Cardiovascular: Normal rate and regular rhythm.   No murmur heard.  Pulmonary/Chest: Effort normal and breath sounds normal.   Abdominal: Bowel sounds are normal.   Psychiatric: He has a normal mood and affect. His behavior is normal.   Nursing note and vitals reviewed.      Assessment/Plan   Chemo was seen today for nasal congestion, sinusitis and cough.    Diagnoses and all orders for this visit:    Acute non-recurrent maxillary sinusitis  -     amoxicillin-clavulanate (AUGMENTIN) 875-125 MG per tablet; Take 1 tablet by mouth Every 12 (Twelve) Hours.    Hand arthritis  -     diclofenac (VOLTAREN) 1 % gel gel; Apply 4 g topically to the appropriate area as directed 4 (Four) Times a Day.

## 2020-04-15 PROBLEM — Z92.89 HISTORY OF ECHOCARDIOGRAM: Status: ACTIVE | Noted: 2020-04-15

## 2020-04-15 PROBLEM — Z92.89 HISTORY OF STRESS TEST: Status: ACTIVE | Noted: 2020-04-15

## 2020-04-16 ENCOUNTER — TELEMEDICINE (OUTPATIENT)
Dept: CARDIOLOGY | Facility: CLINIC | Age: 60
End: 2020-04-16

## 2020-04-16 VITALS — BODY MASS INDEX: 30.8 KG/M2 | HEIGHT: 71 IN | WEIGHT: 220 LBS

## 2020-04-16 DIAGNOSIS — Z95.5 HISTORY OF CORONARY ARTERY STENT PLACEMENT: ICD-10-CM

## 2020-04-16 DIAGNOSIS — E78.5 DYSLIPIDEMIA: ICD-10-CM

## 2020-04-16 DIAGNOSIS — E11.9 CONTROLLED TYPE 2 DIABETES MELLITUS WITHOUT COMPLICATION, WITH LONG-TERM CURRENT USE OF INSULIN (HCC): ICD-10-CM

## 2020-04-16 DIAGNOSIS — R06.89 RESPIRATORY INSUFFICIENCY: ICD-10-CM

## 2020-04-16 DIAGNOSIS — Z79.4 CONTROLLED TYPE 2 DIABETES MELLITUS WITHOUT COMPLICATION, WITH LONG-TERM CURRENT USE OF INSULIN (HCC): ICD-10-CM

## 2020-04-16 DIAGNOSIS — I10 ESSENTIAL HYPERTENSION: ICD-10-CM

## 2020-04-16 DIAGNOSIS — I25.10 CORONARY ARTERY DISEASE INVOLVING NATIVE CORONARY ARTERY OF NATIVE HEART WITHOUT ANGINA PECTORIS: Primary | ICD-10-CM

## 2020-04-16 PROCEDURE — 99214 OFFICE O/P EST MOD 30 MIN: CPT | Performed by: INTERNAL MEDICINE

## 2020-04-16 NOTE — PROGRESS NOTES
You have chosen to receive care through a telehealth visit.  Do you consent to use a video/audio connection for your medical care today? Yes  [facility]        Subjective:     Encounter Date:04/16/2020      Patient ID: Chemo Lin is a 59 y.o. male.      HPI: Chemo Lin agreed to a video visit secondary to the coronavirus pandemic.  He is a pleasant 59-year-old male with coronary heart disease.  He underwent percutaneous coronary intervention with stent deployment to the circumflex in 2005.  He remains active and denies chest pain pressure or tightness.He underwent nuclear stress test 11/26/2019.  This revealed preserved left ventricular function no evidence of ischemia.  Echocardiogram was also obtained that this demonstrated preserved left ventricular function and no significant valvular abnormality.  He does not report any progressive dyspnea on exertion.  He is free of orthopnea or PND no lower extremity edema.  He denies syncope near syncope or palpitations.  He has known dyslipidemia and remains on a low-cholesterol low saturated fat diet as well as rosuvastatin 40 mg daily.  His last lipid profile from 12/30/2019 revealed HDL 33  and triglycerides 117.  He has type 2 diabetes controlled with diet, oral agent and insulin.      The following portions of the patient's history were reviewed and updated as appropriate: allergies, current medications, past family history, past medical history, past social history, past surgical history and problem list.    Problem List:  Patient Active Problem List   Diagnosis   • Hyperuricemia   • Low testosterone   • Essential hypertension   • Vitamin D deficiency   • Dyslipidemia   • Diabetes mellitus type 2, controlled, without complications (CMS/[facility])   • ED (erectile dysfunction)   • Coronary artery disease involving native coronary artery of native heart without angina pectoris   • History of coronary artery stent placement   •  Respiratory insufficiency   • Family history of premature coronary heart disease   • Hand arthritis   • History of stress test   • History of echocardiogram       Past Medical History:  Past Medical History:   Diagnosis Date   • Allergic rhinitis    • Allergic rhinitis    • Coronary artery disease    • Diabetes mellitus, controlled (CMS/[facility])    • Diabetes mellitus, controlled (CMS/[facility])    • History of echocardiogram 4/15/2020    11/26/2019-Calculated EF = 63% Mild mitral valve regurgitation is present Mild tricuspid valve regurgitation is present.      • History of stress test 4/15/2020    11/26/2019-Diaphragmatic attenuation artifact is present. Left ventricular ejection fraction is normal (Calculated EF = 64%). Myocardial perfusion imaging indicates a normal myocardial perfusion study with no evidence of ischemia. Impressions are consistent with a low risk study. Findings consistent with an equivocal ECG stress test   • Hyperlipidemia    • Hypertension     type 2 diabetes mellitus, uncontrolled   • Tendinitis    • Tendinitis    • Type 2 diabetes mellitus (CMS/[facility])    • Type 2 diabetes mellitus (CMS/[facility])        Past Surgical History:  Past Surgical History:   Procedure Laterality Date   • COLONOSCOPY     • CORONARY ANGIOPLASTY WITH STENT PLACEMENT     • FOOT SURGERY Right        Social History:  Social History     Socioeconomic History   • Marital status:      Spouse name: Not on file   • Number of children: Not on file   • Years of education: Not on file   • Highest education level: Not on file   Tobacco Use   • Smoking status: Never Smoker   • Smokeless tobacco: Never Used   Substance and Sexual Activity   • Alcohol use: No   • Drug use: Never   • Sexual activity: Defer       Allergies:  Allergies   Allergen Reactions   • Metformin And Related Other (See Comments)     Edema          ROS:  Review of Systems   Constitution: Negative for malaise/fatigue.   HENT: Negative for hearing loss and nosebleeds.   "  Eyes: Negative for double vision and visual disturbance.   Cardiovascular: Positive for dyspnea on exertion. Negative for chest pain, claudication, near-syncope, orthopnea, palpitations, paroxysmal nocturnal dyspnea and syncope.   Respiratory: Negative for cough, shortness of breath, sleep disturbances due to breathing, snoring, sputum production and wheezing.    Endocrine: Negative for cold intolerance, heat intolerance, polydipsia and polyuria.   Hematologic/Lymphatic: Negative for adenopathy and bleeding problem. Does not bruise/bleed easily.   Skin: Negative for flushing and itching.   Musculoskeletal: Negative for back pain, falls, joint pain, joint swelling, muscle weakness and neck pain.   Gastrointestinal: Negative for abdominal pain, dysphagia, heartburn, nausea and vomiting.   Genitourinary: Negative for dysuria and frequency.   Neurological: Negative for disturbances in coordination, dizziness, light-headedness, loss of balance, numbness and weakness.   Psychiatric/Behavioral: Negative for altered mental status and memory loss. The patient is not nervous/anxious.    Allergic/Immunologic: Negative for hives and persistent infections.          Objective:         Ht 180.3 cm (71\")   Wt 99.8 kg (220 lb)   BMI 30.68 kg/m²     Physical Exam not performed    In-Office Procedure(s):  Procedures    ASCVD RIsk Score::  The 10-year ASCVD risk score (Sonu DICK Jr., et al., 2013) is: 15.9%    Values used to calculate the score:      Age: 59 years      Sex: Male      Is Non- : No      Diabetic: Yes      Tobacco smoker: No      Systolic Blood Pressure: 114 mmHg      Is BP treated: Yes      HDL Cholesterol: 33 mg/dL      Total Cholesterol: 164 mg/dL        Assessment/Plan:         1. Coronary artery disease involving native coronary artery of native heart without angina pectoris  Stable free of angina    2. History of coronary artery stent placement  Stable    3. Essential hypertension  Target " less than 130/80    4. Dyslipidemia  Continue diet and statin therapy    5. Controlled type 2 diabetes mellitus without complication, with long-term current use of insulin (CMS/[facility])  Continue diet oral agent and insulin    6. Respiratory insufficiency  Stable    Mr. Magaña is free of angina and does not report any progressive dyspnea on exertion or lower extremity edema.  He is tolerating his medications well.  I counseled him on risk modification.  I have asked him to monitor his blood pressure and contact us if it exceeds 130/80.  Of recommended salt restriction is close to 2000 mg a day as possible.  He tries to observe a low-cholesterol low saturated fat diet takes his statin therapy.  Nevertheless his last lipid profile revealed a LDL of 180.  We would recommend 70 or less.  I have encouraged him to advance his activity level to include aerobic exercise.    Telephone visit lasted 17 minutes office, completing electronic medical record documentation took 11 minutes.           Cedric Gurrola MD  04/16/20  .

## 2020-05-27 RX ORDER — FLUTICASONE PROPIONATE 50 MCG
1 SPRAY, SUSPENSION (ML) NASAL DAILY
COMMUNITY

## 2020-06-01 ENCOUNTER — OFFICE VISIT (OUTPATIENT)
Dept: FAMILY MEDICINE CLINIC | Facility: CLINIC | Age: 60
End: 2020-06-01

## 2020-06-01 VITALS
WEIGHT: 220 LBS | HEART RATE: 78 BPM | HEIGHT: 71 IN | SYSTOLIC BLOOD PRESSURE: 130 MMHG | TEMPERATURE: 97.7 F | DIASTOLIC BLOOD PRESSURE: 80 MMHG | BODY MASS INDEX: 30.8 KG/M2 | RESPIRATION RATE: 16 BRPM

## 2020-06-01 DIAGNOSIS — S86.912A STRAIN OF LEFT KNEE, INITIAL ENCOUNTER: Primary | ICD-10-CM

## 2020-06-01 DIAGNOSIS — Z12.5 SCREENING FOR PROSTATE CANCER: ICD-10-CM

## 2020-06-01 PROCEDURE — 99213 OFFICE O/P EST LOW 20 MIN: CPT | Performed by: FAMILY MEDICINE

## 2020-06-01 NOTE — PROGRESS NOTES
"Subjective   Chemo AXEL Lin is a 60 y.o. male.     CC: Knee Pain    History of Present Illness     Pt comes in today c/o some left knee pain for the past 6-7 days w/o known reason. Has been using some ice for treatment. Has been down on the knee working on a car. No locking/giving out. Hurts medially. Somewhat swollen.      The following portions of the patient's history were reviewed and updated as appropriate: allergies, current medications, past family history, past medical history, past social history, past surgical history and problem list.    Review of Systems   Constitutional: Negative for chills and fever.   Musculoskeletal:        Knee pain   Neurological: Negative for weakness.       /80   Pulse 78   Temp 97.7 °F (36.5 °C) (Oral)   Resp 16   Ht 180.3 cm (71\")   Wt 99.8 kg (220 lb)   BMI 30.68 kg/m²     Objective   Physical Exam   Constitutional: He appears well-developed and well-nourished. No distress.   Musculoskeletal: He exhibits tenderness (along the MCL w/o laxity/mild edema noted).       Assessment/Plan   Chemo was seen today for left knee pain.    Diagnoses and all orders for this visit:    Strain of left knee, initial encounter  -     diclofenac (VOLTAREN) 50 MG EC tablet; Take 1 tablet by mouth 3 (Three) Times a Day.    Screening for prostate cancer  -     PSA Screen    Ice/rest discussed.           "

## 2020-07-23 ENCOUNTER — TELEMEDICINE (OUTPATIENT)
Dept: CARDIOLOGY | Facility: CLINIC | Age: 60
End: 2020-07-23

## 2020-07-23 VITALS — HEIGHT: 71 IN | WEIGHT: 220 LBS | BODY MASS INDEX: 30.8 KG/M2

## 2020-07-23 DIAGNOSIS — E11.9 CONTROLLED TYPE 2 DIABETES MELLITUS WITHOUT COMPLICATION, WITH LONG-TERM CURRENT USE OF INSULIN (HCC): ICD-10-CM

## 2020-07-23 DIAGNOSIS — E78.5 DYSLIPIDEMIA: ICD-10-CM

## 2020-07-23 DIAGNOSIS — Z95.5 HISTORY OF CORONARY ARTERY STENT PLACEMENT: ICD-10-CM

## 2020-07-23 DIAGNOSIS — I10 ESSENTIAL HYPERTENSION: ICD-10-CM

## 2020-07-23 DIAGNOSIS — Z79.4 CONTROLLED TYPE 2 DIABETES MELLITUS WITHOUT COMPLICATION, WITH LONG-TERM CURRENT USE OF INSULIN (HCC): ICD-10-CM

## 2020-07-23 DIAGNOSIS — I25.10 CORONARY ARTERY DISEASE INVOLVING NATIVE CORONARY ARTERY OF NATIVE HEART WITHOUT ANGINA PECTORIS: Primary | ICD-10-CM

## 2020-07-23 PROCEDURE — 99213 OFFICE O/P EST LOW 20 MIN: CPT | Performed by: INTERNAL MEDICINE

## 2020-07-23 NOTE — PROGRESS NOTES
[facility] [place] [facility]    You have chosen to receive care through a telehealth visit.  Do you consent to use a video/audio connection for your medical care today? Yes    Subjective:     Encounter Date:07/23/2020      Patient ID: Chemo Lin is a 60 y.o. male.      HPI: Chemo Lin agreed to a video visit today secondary to the coronavirus pandemic.  He is observing the CDC guidelines for social distancing.  He remains free of fever cough or increased shortness of breath.  He does not report any change in his sense of smell or taste.  Mr. Magaña is free of chest pain pressure or tightness.  He does not experience any significant or progressive dyspnea on exertion.  He denies orthopnea or PND no lower extremity edema.  He is free of syncope near syncope or palpitation.  He continues to tolerate his medications well no reported side effects.  He has a history of dyslipidemia and remains on Zetia 10 mg daily and rosuvastatin 40 mg he has diabetes controlled with diet insulin and oral agent.  He is to have his fasting lipids assessed in the next 1 to 2 weeks.      The following portions of the patient's history were reviewed and updated as appropriate: allergies, current medications, past family history, past medical history, past social history, past surgical history and problem list.    Problem List:  Patient Active Problem List   Diagnosis   • Hyperuricemia   • Low testosterone   • Essential hypertension   • Vitamin D deficiency   • Dyslipidemia   • Diabetes mellitus type 2, controlled, without complications (CMS/[facility])   • ED (erectile dysfunction)   • Coronary artery disease involving native coronary artery of native heart without angina pectoris   • History of coronary artery stent placement   • Respiratory insufficiency   • Family history of premature coronary heart disease   • Hand arthritis   • History of stress test   • History of echocardiogram       Past Medical History:  Past Medical  History:   Diagnosis Date   • Allergic rhinitis    • Allergic rhinitis    • Coronary artery disease    • Diabetes mellitus, controlled (CMS/[facility])    • Diabetes mellitus, controlled (CMS/[facility])    • History of echocardiogram 4/15/2020    11/26/2019-Calculated EF = 63% Mild mitral valve regurgitation is present Mild tricuspid valve regurgitation is present.      • History of stress test 4/15/2020    11/26/2019-Diaphragmatic attenuation artifact is present. Left ventricular ejection fraction is normal (Calculated EF = 64%). Myocardial perfusion imaging indicates a normal myocardial perfusion study with no evidence of ischemia. Impressions are consistent with a low risk study. Findings consistent with an equivocal ECG stress test   • Hyperlipidemia    • Hypertension     type 2 diabetes mellitus, uncontrolled   • Tendinitis    • Tendinitis    • Type 2 diabetes mellitus (CMS/[facility])    • Type 2 diabetes mellitus (CMS/[facility])        Past Surgical History:  Past Surgical History:   Procedure Laterality Date   • COLONOSCOPY     • CORONARY ANGIOPLASTY WITH STENT PLACEMENT     • FOOT SURGERY Right        Social History:  Social History     Socioeconomic History   • Marital status:      Spouse name: Not on file   • Number of children: Not on file   • Years of education: Not on file   • Highest education level: Not on file   Tobacco Use   • Smoking status: Never Smoker   • Smokeless tobacco: Never Used   Substance and Sexual Activity   • Alcohol use: No   • Drug use: Never   • Sexual activity: Defer       Allergies:  Allergies   Allergen Reactions   • Metformin And Related Other (See Comments)     Edema          ROS:  Review of Systems   Constitution: Negative for malaise/fatigue.   HENT: Negative for hearing loss and nosebleeds.    Eyes: Negative for double vision and visual disturbance.   Cardiovascular: Negative for chest pain, claudication, dyspnea on exertion, near-syncope, orthopnea, palpitations, paroxysmal nocturnal  "dyspnea and syncope.   Respiratory: Negative for cough, shortness of breath, sleep disturbances due to breathing, snoring, sputum production and wheezing.    Endocrine: Negative for cold intolerance, heat intolerance, polydipsia and polyuria.   Hematologic/Lymphatic: Negative for adenopathy and bleeding problem. Does not bruise/bleed easily.   Skin: Negative for flushing and itching.   Musculoskeletal: Negative for back pain, falls, joint pain, joint swelling, muscle weakness and neck pain.   Gastrointestinal: Negative for abdominal pain, dysphagia, heartburn, nausea and vomiting.   Genitourinary: Negative for dysuria and frequency.   Neurological: Negative for disturbances in coordination, dizziness, light-headedness, loss of balance, numbness and weakness.   Psychiatric/Behavioral: Negative for altered mental status and memory loss. The patient is not nervous/anxious.    Allergic/Immunologic: Negative for hives and persistent infections.          Objective:         Ht 180.3 cm (71\")   Wt 99.8 kg (220 lb)   BMI 30.68 kg/m²     Physical Exam not performed    In-Office Procedure(s):  Procedures    ASCVD RIsk Score::  The 10-year ASCVD risk score (Sonuinder JENNINGS Jr., et al., 2013) is: 21.1%    Values used to calculate the score:      Age: 60 years      Sex: Male      Is Non- : No      Diabetic: Yes      Tobacco smoker: No      Systolic Blood Pressure: 130 mmHg      Is BP treated: Yes      HDL Cholesterol: 33 mg/dL      Total Cholesterol: 164 mg/dL        Assessment/Plan:         1. Coronary artery disease involving native coronary artery of native heart without angina pectoris  Stable free of angina    2. History of coronary artery stent placement  Stable    3. Essential hypertension  Target less than 130/80    4. Dyslipidemia  Low-cholesterol low saturated fat diet continue rosuvastatin and Zetia    5. Controlled type 2 diabetes mellitus without complication, with long-term current use of insulin " (CMS/[facility])  Diet insulin and oral agent control    Mr. Magaña has maintained his activity level while observing the CDC guidelines for social distancing.  He is free of angina and does not report any significant respiratory insufficiency.  He is tolerating his medications well.  We discussed importance of risk modification.  I have encouraged him to have his blood pressure check and communicate the readings to us.  He is to have fasting lipids in the near future which we will also obtain a copy.  Ideally would like to see his blood pressure under 130/80.  I will see him in follow-up in 6-month sooner if needed.    I spent 8 minutes on video visit, and 8 minutes completing electronic medical record documentation.           Cedric Gurrola MD  07/23/20  .

## 2020-08-06 LAB — PSA SERPL-MCNC: 0.63 NG/ML (ref 0–4)

## 2020-11-09 ENCOUNTER — OFFICE VISIT (OUTPATIENT)
Dept: FAMILY MEDICINE CLINIC | Facility: CLINIC | Age: 60
End: 2020-11-09

## 2020-11-09 VITALS
RESPIRATION RATE: 16 BRPM | WEIGHT: 213 LBS | TEMPERATURE: 96.7 F | SYSTOLIC BLOOD PRESSURE: 137 MMHG | DIASTOLIC BLOOD PRESSURE: 86 MMHG | HEART RATE: 74 BPM | BODY MASS INDEX: 29.82 KG/M2 | HEIGHT: 71 IN

## 2020-11-09 DIAGNOSIS — M25.562 CHRONIC PAIN OF LEFT KNEE: Primary | ICD-10-CM

## 2020-11-09 DIAGNOSIS — Z23 IMMUNIZATION DUE: ICD-10-CM

## 2020-11-09 DIAGNOSIS — G89.29 CHRONIC PAIN OF LEFT KNEE: Primary | ICD-10-CM

## 2020-11-09 PROCEDURE — 90471 IMMUNIZATION ADMIN: CPT | Performed by: FAMILY MEDICINE

## 2020-11-09 PROCEDURE — 90686 IIV4 VACC NO PRSV 0.5 ML IM: CPT | Performed by: FAMILY MEDICINE

## 2020-11-09 PROCEDURE — 99213 OFFICE O/P EST LOW 20 MIN: CPT | Performed by: FAMILY MEDICINE

## 2020-11-09 RX ORDER — ROSUVASTATIN CALCIUM 40 MG/1
40 TABLET, COATED ORAL DAILY
COMMUNITY
Start: 2020-11-07 | End: 2022-12-22 | Stop reason: SDUPTHER

## 2020-11-09 NOTE — PROGRESS NOTES
"Subjective   Chemo Lin is a 60 y.o. male.     CC: Knee Pain    History of Present Illness     Pt comes in today c/o left knee pain that continues since seeing me on the 6/1/20 visit. That HPI was as follows:    Pt comes in today c/o some left knee pain for the past 6-7 days w/o known reason. Has been using some ice for treatment. Has been down on the knee working on a car. No locking/giving out. Hurts medially. Somewhat swollen.    Was treated with Diclofenac w/o improvement.    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      The following portions of the patient's history were reviewed and updated as appropriate: allergies, current medications, past family history, past medical history, past social history, past surgical history and problem list.    Review of Systems   Constitutional: Negative for activity change, chills and fever.   Respiratory: Negative for cough.    Cardiovascular: Negative for chest pain.   Musculoskeletal:        Left knee pain   Psychiatric/Behavioral: Negative for dysphoric mood.       /86   Pulse 74   Temp 96.7 °F (35.9 °C) (Oral)   Resp 16   Ht 180.3 cm (71\")   Wt 96.6 kg (213 lb)   BMI 29.71 kg/m²     Objective   Physical Exam  Constitutional:       General: He is not in acute distress.     Appearance: He is well-developed.   Pulmonary:      Effort: Pulmonary effort is normal.   Musculoskeletal:         General: Tenderness (over MCL; no laxity on exam) present.   Neurological:      Mental Status: He is alert and oriented to person, place, and time.   Psychiatric:         Behavior: Behavior normal.         Thought Content: Thought content normal.         Assessment/Plan   Diagnoses and all orders for this visit:    1. Chronic pain of left knee (Primary)  -     Ambulatory Referral to Orthopedic Surgery    2. Immunization due  -     Fluarix Quad >6 Months (VFC) ([id number])               "

## 2021-02-03 PROBLEM — Z95.5 HISTORY OF CORONARY ARTERY STENT PLACEMENT: Chronic | Status: ACTIVE | Noted: 2019-10-17

## 2021-02-03 PROBLEM — I25.10 CORONARY ARTERY DISEASE INVOLVING NATIVE CORONARY ARTERY OF NATIVE HEART WITHOUT ANGINA PECTORIS: Chronic | Status: ACTIVE | Noted: 2019-10-17

## 2021-02-04 ENCOUNTER — OFFICE VISIT (OUTPATIENT)
Dept: CARDIOLOGY | Facility: CLINIC | Age: 61
End: 2021-02-04

## 2021-02-04 VITALS
SYSTOLIC BLOOD PRESSURE: 122 MMHG | HEIGHT: 71 IN | DIASTOLIC BLOOD PRESSURE: 78 MMHG | WEIGHT: 215 LBS | HEART RATE: 78 BPM | BODY MASS INDEX: 30.1 KG/M2

## 2021-02-04 DIAGNOSIS — I10 ESSENTIAL HYPERTENSION: Chronic | ICD-10-CM

## 2021-02-04 DIAGNOSIS — Z79.4 CONTROLLED TYPE 2 DIABETES MELLITUS WITHOUT COMPLICATION, WITH LONG-TERM CURRENT USE OF INSULIN (HCC): Chronic | ICD-10-CM

## 2021-02-04 DIAGNOSIS — Z95.5 HISTORY OF CORONARY ARTERY STENT PLACEMENT: Chronic | ICD-10-CM

## 2021-02-04 DIAGNOSIS — E11.9 CONTROLLED TYPE 2 DIABETES MELLITUS WITHOUT COMPLICATION, WITH LONG-TERM CURRENT USE OF INSULIN (HCC): Chronic | ICD-10-CM

## 2021-02-04 DIAGNOSIS — R06.89 RESPIRATORY INSUFFICIENCY: ICD-10-CM

## 2021-02-04 DIAGNOSIS — I25.10 CORONARY ARTERY DISEASE INVOLVING NATIVE CORONARY ARTERY OF NATIVE HEART WITHOUT ANGINA PECTORIS: Primary | Chronic | ICD-10-CM

## 2021-02-04 DIAGNOSIS — Z82.49 FAMILY HISTORY OF PREMATURE CORONARY HEART DISEASE: ICD-10-CM

## 2021-02-04 DIAGNOSIS — E78.5 DYSLIPIDEMIA: Chronic | ICD-10-CM

## 2021-02-04 PROCEDURE — 99214 OFFICE O/P EST MOD 30 MIN: CPT | Performed by: INTERNAL MEDICINE

## 2021-02-04 NOTE — PROGRESS NOTES
"Chief Complaint  Coronary Artery Disease    Subjective    History of Present Illness      Chemo Lin presents to [facility] for continued cardiovascular evaluation.  He is a very pleasant 60-year-old male who observes the CDC guidelines during the coronavirus pandemic.  He is free of chest pain pressure or tightness.  He has his baseline dyspnea on exertion but this is stable and not progressive.  He does not report orthopnea or PND no syncope near syncope or lower extremity edema.  He denies any palpitations.  His blood pressure remains well controlled.  He has type 2 diabetes and hyperlipidemia which is monitored by his endocrinologist.  Review of his echocardiogram from 11/26/2019 revealed normal left ventricular contractility, mild mitral tricuspid insufficiency.  Nuclear stress test from that date also demonstrates preserved left ventricular contractility no evidence of ischemia or infarction.  Fasting lipid profile obtained 11/23/2020 reveals triglycerides 105, HDL 33,   Objective   Vital Signs:   /78   Pulse 78   Ht 180.3 cm (71\")   Wt 97.5 kg (215 lb)   BMI 29.99 kg/m²     Constitutional:       Appearance: Well-developed.   Eyes:      Conjunctiva/sclera: Conjunctivae normal.      Pupils: Pupils are equal, round, and reactive to light.   HENT:      Head: Normocephalic and atraumatic.   Neck:      Musculoskeletal: Neck supple.      Thyroid: No thyromegaly.   Pulmonary:      Effort: Pulmonary effort is normal. No respiratory distress.      Breath sounds: Normal breath sounds. No wheezing. No rales.   Cardiovascular:      Normal rate. Regular rhythm.      S4 Gallop. No S3 gallop. Midsystolic click click.   Edema:     Peripheral edema absent.   Abdominal:      General: Bowel sounds are normal. There is no distension.      Palpations: Abdomen is soft. There is no abdominal mass.      Tenderness: There is no abdominal tenderness. "   Musculoskeletal: Normal range of motion.   Skin:     General: Skin is warm and dry.      Findings: No erythema.   Neurological:      Mental Status: Alert and oriented to person, place, and time.         Result Review :   The following data was reviewed by: Cedric Gurrola MD on 02/04/2021:  Common labs    Common Labsle 8/6/20   PSA 0.626      Comments are available for some flowsheets but are not being displayed.         Fasting lipid profile from 11/23/2020  Data reviewed: Cardiology studies 11/26/2019-echo reviewed, 11/26/2019 nuclear stress test reviewed          Assessment and Plan    1. Coronary artery disease involving native coronary artery of native heart without angina pectoris  Free of angina and stable    2. History of coronary artery stent placement  Stable    3. Essential hypertension  Controlled    4. Dyslipidemia  Low-cholesterol low saturated fat diet and rosuvastatin 40 mg daily    5. Controlled type 2 diabetes mellitus without complication, with long-term current use of insulin (CMS/[facility])      6. Family history of premature coronary heart disease      7. Respiratory insufficiency  Stable    Mr. Magaña is in good spirits and is maintained his activity level while observing the CDC guidelines.  He is free of angina and euvolemic.  His blood pressure is controlled.  His lipids in diabetes him are monitored by his endocrinologist.  I will plan to see him in follow-up in 6 months sooner if needed        Follow Up   No follow-ups on file.  Patient was given instructions and counseling regarding his condition or for health maintenance advice. Please see specific information pulled into the AVS if appropriate.

## 2021-03-19 ENCOUNTER — BULK ORDERING (OUTPATIENT)
Dept: CASE MANAGEMENT | Facility: OTHER | Age: 61
End: 2021-03-19

## 2021-03-19 DIAGNOSIS — Z23 IMMUNIZATION DUE: ICD-10-CM

## 2021-05-27 ENCOUNTER — TELEPHONE (OUTPATIENT)
Dept: CARDIOLOGY | Facility: CLINIC | Age: 61
End: 2021-05-27

## 2021-06-02 ENCOUNTER — TELEPHONE (OUTPATIENT)
Dept: CARDIOLOGY | Facility: CLINIC | Age: 61
End: 2021-06-02

## 2021-06-02 NOTE — TELEPHONE ENCOUNTER
21-Dr Cedric Gurrola  Pt: Chemo Lin  :  1960  Number:   Reason for Call: Pt. 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 You can reach him at the number above.

## 2021-08-12 ENCOUNTER — OFFICE VISIT (OUTPATIENT)
Dept: CARDIOLOGY | Facility: CLINIC | Age: 61
End: 2021-08-12

## 2021-08-12 VITALS
BODY MASS INDEX: 29.12 KG/M2 | SYSTOLIC BLOOD PRESSURE: 124 MMHG | HEART RATE: 79 BPM | WEIGHT: 208 LBS | HEIGHT: 71 IN | DIASTOLIC BLOOD PRESSURE: 76 MMHG

## 2021-08-12 DIAGNOSIS — R06.89 RESPIRATORY INSUFFICIENCY: ICD-10-CM

## 2021-08-12 DIAGNOSIS — Z79.4 CONTROLLED TYPE 2 DIABETES MELLITUS WITHOUT COMPLICATION, WITH LONG-TERM CURRENT USE OF INSULIN (HCC): ICD-10-CM

## 2021-08-12 DIAGNOSIS — I10 ESSENTIAL HYPERTENSION: ICD-10-CM

## 2021-08-12 DIAGNOSIS — E11.9 CONTROLLED TYPE 2 DIABETES MELLITUS WITHOUT COMPLICATION, WITH LONG-TERM CURRENT USE OF INSULIN (HCC): ICD-10-CM

## 2021-08-12 DIAGNOSIS — Z82.49 FAMILY HISTORY OF PREMATURE CORONARY HEART DISEASE: ICD-10-CM

## 2021-08-12 DIAGNOSIS — I25.10 CORONARY ARTERY DISEASE INVOLVING NATIVE CORONARY ARTERY OF NATIVE HEART WITHOUT ANGINA PECTORIS: Primary | ICD-10-CM

## 2021-08-12 DIAGNOSIS — Z95.5 HISTORY OF CORONARY ARTERY STENT PLACEMENT: ICD-10-CM

## 2021-08-12 DIAGNOSIS — E78.5 DYSLIPIDEMIA: ICD-10-CM

## 2021-08-12 PROCEDURE — 99214 OFFICE O/P EST MOD 30 MIN: CPT | Performed by: INTERNAL MEDICINE

## 2021-08-12 RX ORDER — METFORMIN HYDROCHLORIDE 500 MG/1
1000 TABLET, EXTENDED RELEASE ORAL 2 TIMES DAILY
COMMUNITY
Start: 2021-05-24 | End: 2023-03-14 | Stop reason: SDUPTHER

## 2021-08-12 NOTE — PROGRESS NOTES
"Chief Complaint  Coronary Artery Disease    Subjective    History of Present Illness      I saw Chemo Lin today for cardiovascular care.  He is a very pleasant and active 61-year-old male.  He reports feeling well and is free of any chest pain pressure tightness.  He has known coronary heart disease status post previous coronary intervention with stent placement.  He underwent nuclear stress testing 11/26/2019 which revealed preserved left-ventricular function no evidence of ischemia or infarction.  Echocardiogram at that time also confirms normal left ventricular contractility no significant valvular abnormality.  He is free of any progressive or significant dyspnea on exertion.  He denies orthopnea or PND and does not experience lower extremity edema.  He is free of syncope near syncope or palpitations.  His blood pressure is well controlled.  He has had a 7 pound weight reduction.  His lipids are monitored by his endocrinologist.  He remains on Zetia 10 mg daily and rosuvastatin 40 mg daily.  He has type 2 diabetes managed by diet oral agents and insulin.    Objective   Vital Signs:   /76   Pulse 79   Ht 180.3 cm (71\")   Wt 94.3 kg (208 lb)   BMI 29.01 kg/m²     Constitutional:       Appearance: Well-developed.   Eyes:      Conjunctiva/sclera: Conjunctivae normal.      Pupils: Pupils are equal, round, and reactive to light.   HENT:      Head: Normocephalic and atraumatic.   Neck:      Thyroid: No thyromegaly.   Pulmonary:      Effort: Pulmonary effort is normal. No respiratory distress.      Breath sounds: Normal breath sounds. No wheezing. No rales.   Cardiovascular:      Normal rate. Regular rhythm.      S4 Gallop. No S3 gallop. Midsystolic click click.   Abdominal:      General: Bowel sounds are normal. There is no distension.      Palpations: Abdomen is soft. There is no abdominal mass.      Tenderness: There is no abdominal tenderness.   Musculoskeletal: Normal range of motion.      Cervical " back: Neck supple. Skin:     General: Skin is warm and dry.      Findings: No erythema.   Neurological:      Mental Status: Alert and oriented to person, place, and time.         Result Review :     Common labs    Common Labsle 2/23/21 3/22/21   Glucose 193 (A) 239 (A)   BUN 25 (A) 23 (A)   Creatinine 1.8 (A) 1.1   Sodium 142 139   Potassium 3.9 4.1   Chloride 100 100   Calcium 9.9 9.7   (A) Abnormal value                      Assessment and Plan    1. Coronary artery disease involving native coronary artery of native heart without angina pectoris  Stable free of angina    2. History of coronary artery stent placement  Stable    3. Essential hypertension  Controlled    4. Dyslipidemia  Controlled    5. Controlled type 2 diabetes mellitus without complication, with long-term current use of insulin (CMS/[facility])  Controlled    6. Family history of premature coronary heart disease      7. Respiratory insufficiency  Stable    Mr. Lin is active free of angina and euvolemic on physical examination.  His blood pressure is controlled and his lipids and diabetes is monitored by his endocrinologist.  I have encouraged him to observe a low-cholesterol low saturated fat diet and remain active.  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  I will otherwise see him in follow-up in 6 months sooner if needed.        Follow Up   No follow-ups on file.  Patient was given instructions and counseling regarding his condition or for health maintenance advice. Please see specific information pulled into the AVS if appropriate.

## 2021-11-15 ENCOUNTER — APPOINTMENT (OUTPATIENT)
Dept: GENERAL RADIOLOGY | Facility: HOSPITAL | Age: 61
End: 2021-11-15

## 2021-11-15 ENCOUNTER — HOSPITAL ENCOUNTER (EMERGENCY)
Facility: HOSPITAL | Age: 61
Discharge: HOME | End: 2021-11-15
Attending: EMERGENCY MEDICINE | Admitting: EMERGENCY MEDICINE

## 2021-11-15 VITALS
WEIGHT: 197 LBS | RESPIRATION RATE: 16 BRPM | DIASTOLIC BLOOD PRESSURE: 97 MMHG | HEIGHT: 70 IN | TEMPERATURE: 97.5 F | OXYGEN SATURATION: 95 % | BODY MASS INDEX: 28.2 KG/M2 | SYSTOLIC BLOOD PRESSURE: 141 MMHG | HEART RATE: 74 BPM

## 2021-11-15 DIAGNOSIS — M54.50 ACUTE MIDLINE LOW BACK PAIN WITHOUT SCIATICA: ICD-10-CM

## 2021-11-15 DIAGNOSIS — M25.511 ACUTE PAIN OF RIGHT SHOULDER: ICD-10-CM

## 2021-11-15 DIAGNOSIS — M79.642 LEFT HAND PAIN: ICD-10-CM
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
## 2021-11-15 LAB — QT INTERVAL: 396 MS

## 2021-11-15 PROCEDURE — 73130 X-RAY EXAM OF HAND: CPT

## 2021-11-15 PROCEDURE — 93005 ELECTROCARDIOGRAM TRACING: CPT | Performed by: EMERGENCY MEDICINE

## 2021-11-15 PROCEDURE — 99283 EMERGENCY DEPT VISIT LOW MDM: CPT

## 2021-11-15 PROCEDURE — 93010 ELECTROCARDIOGRAM REPORT: CPT | Performed by: INTERNAL MEDICINE

## 2021-11-15 PROCEDURE — 93005 ELECTROCARDIOGRAM TRACING: CPT

## 2021-11-15 PROCEDURE — 72220 X-RAY EXAM SACRUM TAILBONE: CPT

## 2021-11-15 PROCEDURE — 72110 X-RAY EXAM L-2 SPINE 4/>VWS: CPT

## 2021-11-15 PROCEDURE — 73030 X-RAY EXAM OF SHOULDER: CPT

## 2021-11-15 RX ORDER — HYDROCODONE BITARTRATE AND ACETAMINOPHEN 5; 325 MG/1; MG/1
1 TABLET ORAL EVERY 6 HOURS PRN
Qty: 8 TABLET | Refills: 0 | Status: SHIPPED | OUTPATIENT
Start: 2021-11-15 | End: 2021-12-07

## 2021-11-15 RX ORDER — HYDROCODONE BITARTRATE AND ACETAMINOPHEN 5; 325 MG/1; MG/1
1 TABLET ORAL EVERY 6 HOURS PRN
Qty: 8 TABLET | Refills: 0 | Status: SHIPPED | OUTPATIENT
Start: 2021-11-15 | End: 2022-01-19
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
## 2021-11-15 NOTE — DISCHARGE INSTRUCTIONS
Home to rest  Drink plenty of fluids  Follow up with your doctor  Tylenol for pain   Return if worse or new concerns   Continue care with your primary care physician and have your blood pressure regularly checked and managed. Normal blood pressure is 120/80.

## 2021-11-18 ENCOUNTER — TRANSCRIBE ORDERS (OUTPATIENT)
Dept: PHYSICAL THERAPY | Facility: CLINIC | Age: 61
End: 2021-11-18

## 2021-11-18 DIAGNOSIS — S46.911A STRAIN OF RIGHT SHOULDER, INITIAL ENCOUNTER: ICD-10-CM

## 2021-11-18 DIAGNOSIS — S63.92XA HAND SPRAIN, LEFT, INITIAL ENCOUNTER: Primary | ICD-10-CM

## 2021-11-19 ENCOUNTER — TREATMENT (OUTPATIENT)
Dept: PHYSICAL THERAPY | Facility: CLINIC | Age: 61
End: 2021-11-19

## 2021-11-19 DIAGNOSIS — S66.912S: ICD-10-CM

## 2021-11-19 DIAGNOSIS — S46.911S STRAIN OF RIGHT SHOULDER, SEQUELA: Primary | ICD-10-CM

## 2021-11-19 PROCEDURE — 97014 ELECTRIC STIMULATION THERAPY: CPT | Performed by: PHYSICAL THERAPIST

## 2021-11-19 PROCEDURE — 97162 PT EVAL MOD COMPLEX 30 MIN: CPT | Performed by: PHYSICAL THERAPIST

## 2021-11-19 PROCEDURE — 97140 MANUAL THERAPY 1/> REGIONS: CPT | Performed by: PHYSICAL THERAPIST

## 2021-11-19 PROCEDURE — 97110 THERAPEUTIC EXERCISES: CPT | Performed by: PHYSICAL THERAPIST
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
## 2021-11-22 ENCOUNTER — TREATMENT (OUTPATIENT)
Dept: PHYSICAL THERAPY | Facility: CLINIC | Age: 61
End: 2021-11-22

## 2021-11-22 DIAGNOSIS — S66.912S: ICD-10-CM

## 2021-11-22 DIAGNOSIS — S46.911S STRAIN OF RIGHT SHOULDER, SEQUELA: Primary | ICD-10-CM

## 2021-11-22 PROCEDURE — 97014 ELECTRIC STIMULATION THERAPY: CPT | Performed by: PHYSICAL THERAPIST

## 2021-11-22 PROCEDURE — 97110 THERAPEUTIC EXERCISES: CPT | Performed by: PHYSICAL THERAPIST

## 2021-11-22 NOTE — PROGRESS NOTES
Physical Therapy Daily Progress Note      Visit # 2      Subjective Evaluation    History of Present Illness    Subjective comment: Pt reports that the swellin is down in his hand, but he is still having trouble gripping anything heavy with it.       Objective   See Exercise, Manual, and Modality Logs for complete treatment.   Added corner stretch, AAROM shoulder Abd, EXT, and IR with cane    Assessment & Plan     Assessment  Assessment details: Pt tolerated treatment with minimal c/o pain in (R) shoulder. Pt experienced some pain in (R) shoulder at end range of AROM abduction.  Pt was instructed to not push into pain. Pt's HEP was updated with new exercise, and copy was given to pt.                     Manual Therapy:    0     mins  78948;  Therapeutic Exercise:    38     mins  84013;     Neuromuscular Lola:    0    mins  58679;    Therapeutic Activity:     0     mins  48080;     Gait Trainin     mins  15488;     Ultrasound:     0     mins  73476;    Work Hardening           0      mins 22636  Iontophoresis               0   mins 76369  E-Stim                          _15_ mins 29006 ( )    Timed Treatment:   38   mins   Total Treatment:     53   mins    Robin Giraldo PTA  Physical Therapist Assistant

## 2021-11-24 ENCOUNTER — TREATMENT (OUTPATIENT)
Dept: PHYSICAL THERAPY | Facility: CLINIC | Age: 61
End: 2021-11-24

## 2021-11-24 DIAGNOSIS — S66.912S: ICD-10-CM

## 2021-11-24 DIAGNOSIS — S46.911S STRAIN OF RIGHT SHOULDER, SEQUELA: Primary | ICD-10-CM

## 2021-11-24 PROCEDURE — 97110 THERAPEUTIC EXERCISES: CPT | Performed by: PHYSICAL THERAPIST

## 2021-11-24 NOTE — PROGRESS NOTES
"Physical Therapy Daily Progress Note      Visit # 3      Subjective Evaluation    History of Present Illness    Subjective comment: Pt reports that his (R) shoulder is \"feeling pretty good\", and his (L) hand is \"much better\".       Objective          Active Range of Motion     Right Shoulder   Flexion: 168 degrees   Abduction: 160 degrees     Left Wrist   Wrist flexion: 59 degrees   Wrist extension: 50 degrees     Strength/Myotome Testing     Right Shoulder     Planes of Motion   Flexion: 4+   Abduction: 5   External rotation at 0°: 5   Internal rotation at 0°: 4+     Left Wrist/Hand   Wrist extension: 5  Wrist flexion: 5      See Exercise, Manual, and Modality Logs for complete treatment.       Assessment & Plan     Assessment    Assessment details: Pt's subjective reports are much improved.  Reports slight pain in (R) shoulder at end ranges only.  Objective measurements of AROM and strength  are improved from first visit.   Pt tolerated treatment today with no c/o pain.  Added TB rows and resisted shoulder extensions for greater shoulder strength.    Goals  Plan Goals: STG (2 weeks)  1.  Pt will be independent with initial HEP for improved right shoulder and left wrist/thumb mobility with decreased pain. MET  2.  Pt will demonstrate bilaterally equal shoulder ROM for ease with all shoulder tasks. MET  3.  Pt will report decreased pain at the worst from 8/10 to 4/10 with ADL's and functional tasks. MET  4.  Pt will be able to perform all self care tasks with UE's without increased pain. MET    LTGs (4 weeks)  1.  Pt will be independent with bilateral UE strengthening and stabilization exercises for ease with return to work duties. MET  2.  Pt will demonstrate 5/5 right shoulder and left wrist strength for stability with UE tasks. Partially MET  3.  Pt will return to regular job duties.  4.  Pt will report improved perceived function via Quick DASH from 68.18 to 40 or less disability.                      Manual " Therapy:    0     mins  36441;  Therapeutic Exercise:    40     mins  30958;     Neuromuscular Lola:    0    mins  37726;    Therapeutic Activity:     0     mins  71639;     Gait Trainin     mins  36018;     Ultrasound:     0     mins  74506;    Work Hardening           0      mins 50961  Iontophoresis               0   mins 17972  E-Stim                          _0_ mins 96161 ( )    Timed Treatment:   40   mins   Total Treatment:     40   mins    Robin Giraldo PTA  Physical Therapist Assistant

## 2021-12-07 ENCOUNTER — TELEMEDICINE (OUTPATIENT)
Dept: FAMILY MEDICINE CLINIC | Facility: CLINIC | Age: 61
End: 2021-12-07

## 2021-12-07 VITALS — HEIGHT: 70 IN | BODY MASS INDEX: 28.63 KG/M2 | WEIGHT: 200 LBS

## 2021-12-07 DIAGNOSIS — J06.9 ACUTE URI: Primary | ICD-10-CM

## 2021-12-07 PROCEDURE — 99213 OFFICE O/P EST LOW 20 MIN: CPT | Performed by: FAMILY MEDICINE

## 2021-12-07 RX ORDER — AMOXICILLIN AND CLAVULANATE POTASSIUM 875; 125 MG/1; MG/1
1 TABLET, FILM COATED ORAL EVERY 12 HOURS SCHEDULED
Qty: 20 TABLET | Refills: 0 | Status: SHIPPED | OUTPATIENT
Start: 2021-12-07 | End: 2022-01-18

## 2021-12-07 NOTE — PROGRESS NOTES
Subjective   Chemo Lin is a 61 y.o. male.     CC: VV for URI-Sx    History of Present Illness     Pt seen today on a VV c/o green sinus d/c, cough, facial p/p all for about 1 week. No COVID sx (no dyspnea, no change to smell/taste).       The following portions of the patient's history were reviewed and updated as appropriate: allergies, current medications, past family history, past medical history, past social history, past surgical history and problem list.    Review of Systems   Constitutional: Negative for activity change, chills and fever.   HENT: Positive for congestion, postnasal drip and sinus pressure.    Respiratory: Positive for cough (mild).    Cardiovascular: Negative for chest pain.   Psychiatric/Behavioral: Negative for dysphoric mood.       Objective   Physical Exam  Constitutional:       General: He is not in acute distress.     Appearance: He is well-developed.   Pulmonary:      Effort: Pulmonary effort is normal.   Neurological:      Mental Status: He is alert and oriented to person, place, and time.   Psychiatric:         Behavior: Behavior normal.         Thought Content: Thought content normal.         Assessment/Plan   Diagnoses and all orders for this visit:    1. Acute URI (Primary)  -     amoxicillin-clavulanate (Augmentin) 875-125 MG per tablet; Take 1 tablet by mouth Every 12 (Twelve) Hours.  Dispense: 20 tablet; Refill: 0      Spent  11   minutes with chart and interview and consent for this encounter given by the patient.  You have chosen to receive care through a telehealth visit.  Do you consent to use a video/audio connection for your medical care today? Yes
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
## 2021-12-09 ENCOUNTER — OFFICE VISIT (OUTPATIENT)
Dept: CARDIOLOGY | Facility: CLINIC | Age: 61
End: 2021-12-09

## 2021-12-09 VITALS
BODY MASS INDEX: 29.35 KG/M2 | HEART RATE: 93 BPM | DIASTOLIC BLOOD PRESSURE: 70 MMHG | WEIGHT: 205 LBS | SYSTOLIC BLOOD PRESSURE: 124 MMHG | HEIGHT: 70 IN

## 2021-12-09 DIAGNOSIS — I10 ESSENTIAL HYPERTENSION: ICD-10-CM

## 2021-12-09 DIAGNOSIS — Z95.5 HISTORY OF CORONARY ARTERY STENT PLACEMENT: ICD-10-CM

## 2021-12-09 DIAGNOSIS — Z82.49 FAMILY HISTORY OF PREMATURE CORONARY HEART DISEASE: ICD-10-CM

## 2021-12-09 DIAGNOSIS — I25.10 CORONARY ARTERY DISEASE INVOLVING NATIVE CORONARY ARTERY OF NATIVE HEART WITHOUT ANGINA PECTORIS: Primary | ICD-10-CM

## 2021-12-09 DIAGNOSIS — E11.9 CONTROLLED TYPE 2 DIABETES MELLITUS WITHOUT COMPLICATION, WITH LONG-TERM CURRENT USE OF INSULIN (HCC): ICD-10-CM

## 2021-12-09 DIAGNOSIS — Z79.4 CONTROLLED TYPE 2 DIABETES MELLITUS WITHOUT COMPLICATION, WITH LONG-TERM CURRENT USE OF INSULIN (HCC): ICD-10-CM

## 2021-12-09 DIAGNOSIS — E78.5 DYSLIPIDEMIA: ICD-10-CM

## 2021-12-09 DIAGNOSIS — R06.89 RESPIRATORY INSUFFICIENCY: ICD-10-CM

## 2021-12-09 PROCEDURE — 99214 OFFICE O/P EST MOD 30 MIN: CPT | Performed by: INTERNAL MEDICINE

## 2021-12-14 ENCOUNTER — TRANSCRIBE ORDERS (OUTPATIENT)
Dept: ADMINISTRATIVE | Facility: HOSPITAL | Age: 61
End: 2021-12-14

## 2021-12-14 DIAGNOSIS — M25.511 RIGHT SHOULDER PAIN, UNSPECIFIED CHRONICITY: Primary | ICD-10-CM

## 2021-12-18 ENCOUNTER — APPOINTMENT (OUTPATIENT)
Dept: MRI IMAGING | Facility: HOSPITAL | Age: 61
End: 2021-12-18

## 2022-01-08 ENCOUNTER — IMMUNIZATION (OUTPATIENT)
Dept: VACCINE CLINIC | Facility: HOSPITAL | Age: 62
End: 2022-01-08

## 2022-01-08 PROCEDURE — 91300 HC SARSCOV02 VAC 30MCG/0.3ML IM: CPT | Performed by: INTERNAL MEDICINE

## 2022-01-08 PROCEDURE — 0004A HC ADM SARSCOV2 30MCG/0.3ML BOOSTER: CPT | Performed by: INTERNAL MEDICINE

## 2022-01-17 VITALS — WEIGHT: 205 LBS | BODY MASS INDEX: 29.41 KG/M2

## 2022-01-18 ENCOUNTER — TELEPHONE (OUTPATIENT)
Dept: ORTHOPEDIC SURGERY | Facility: CLINIC | Age: 62
End: 2022-01-18

## 2022-01-18 ENCOUNTER — TELEMEDICINE (OUTPATIENT)
Dept: FAMILY MEDICINE CLINIC | Facility: CLINIC | Age: 62
End: 2022-01-18

## 2022-01-18 DIAGNOSIS — J06.9 ACUTE URI: Primary | ICD-10-CM

## 2022-01-18 PROCEDURE — 99213 OFFICE O/P EST LOW 20 MIN: CPT | Performed by: FAMILY MEDICINE

## 2022-01-18 RX ORDER — INSULIN DEGLUDEC INJECTION 100 U/ML
INJECTION, SOLUTION SUBCUTANEOUS
COMMUNITY
Start: 2021-12-20 | End: 2023-01-06 | Stop reason: SDUPTHER

## 2022-01-18 RX ORDER — CHLORCYCLIZINE HYDROCHLORIDE AND PSEUDOEPHEDRINE HYDROCHLORIDE 25; 60 MG/1; MG/1
1 TABLET ORAL 2 TIMES DAILY PRN
Qty: 60 TABLET | Refills: 5 | Status: SHIPPED | OUTPATIENT
Start: 2022-01-18

## 2022-01-18 NOTE — PROGRESS NOTES
Subjective   Chemo Lin is a 61 y.o. male.     CC: VV for URI-Sx    History of Present Illness     Pt seen today on a VV c/o sinus p/p and congestion for 1 week. Pt had some old StaHist and started back on that with complete resolution of his sx. Pt feels back to normal currently, but is out of StaHist.      The following portions of the patient's history were reviewed and updated as appropriate: allergies, current medications, past family history, past medical history, past social history, past surgical history and problem list.    Review of Systems   Constitutional: Negative for activity change, chills and fever.   Respiratory: Negative for cough.    Cardiovascular: Negative for chest pain.   Psychiatric/Behavioral: Negative for dysphoric mood.       Objective   Physical Exam  Constitutional:       General: He is not in acute distress.     Appearance: He is well-developed.   Pulmonary:      Effort: Pulmonary effort is normal.   Neurological:      Mental Status: He is alert and oriented to person, place, and time.   Psychiatric:         Behavior: Behavior normal.         Thought Content: Thought content normal.         Assessment/Plan   Diagnoses and all orders for this visit:    1. Acute URI (Primary)  -     Chlorcyclizine-Pseudoephed (Stahist AD) 25-60 MG tablet; Take 1 tablet by mouth 2 (Two) Times a Day As Needed (sinus congestion).  Dispense: 60 tablet; Refill: 5      Spent  12   minutes with chart and interview and consent for this encounter given by the patient.  You have chosen to receive care through a telehealth visit.  Do you consent to use a video/audio connection for your medical care today? Yes

## 2022-01-19 ENCOUNTER — OFFICE VISIT (OUTPATIENT)
Dept: ORTHOPEDIC SURGERY | Facility: CLINIC | Age: 62
End: 2022-01-19

## 2022-01-19 VITALS
WEIGHT: 205 LBS | HEART RATE: 78 BPM | BODY MASS INDEX: 29.35 KG/M2 | HEIGHT: 70 IN | SYSTOLIC BLOOD PRESSURE: 119 MMHG | DIASTOLIC BLOOD PRESSURE: 76 MMHG

## 2022-01-19 DIAGNOSIS — M75.21 BICEPS TENDINITIS OF RIGHT UPPER EXTREMITY: ICD-10-CM

## 2022-01-19 DIAGNOSIS — M75.41 SUBACROMIAL IMPINGEMENT OF RIGHT SHOULDER: ICD-10-CM

## 2022-01-19 DIAGNOSIS — M75.121 COMPLETE TEAR OF RIGHT ROTATOR CUFF, UNSPECIFIED WHETHER TRAUMATIC: Primary | ICD-10-CM

## 2022-01-19 DIAGNOSIS — M25.511 RIGHT SHOULDER PAIN, UNSPECIFIED CHRONICITY: ICD-10-CM

## 2022-01-19 PROCEDURE — 99204 OFFICE O/P NEW MOD 45 MIN: CPT | Performed by: ORTHOPAEDIC SURGERY

## 2022-01-19 RX ORDER — MELOXICAM 7.5 MG/1
15 TABLET ORAL ONCE
Status: CANCELLED | OUTPATIENT
Start: 2022-01-19 | End: 2022-01-19

## 2022-01-19 RX ORDER — ACETAMINOPHEN 325 MG/1
1000 TABLET ORAL ONCE
Status: CANCELLED | OUTPATIENT
Start: 2022-01-19 | End: 2022-01-19

## 2022-01-19 RX ORDER — PREGABALIN 150 MG/1
150 CAPSULE ORAL ONCE
Status: CANCELLED | OUTPATIENT
Start: 2022-01-19 | End: 2022-01-19
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
## 2022-01-25 ENCOUNTER — TELEPHONE (OUTPATIENT)
Dept: CARDIOLOGY | Facility: CLINIC | Age: 62
End: 2022-01-25

## 2022-01-25 NOTE — TELEPHONE ENCOUNTER
In the next few weeks PT is having Shoulder SX with DR Newman in [place]. PT is not sure if he needs cardiac Cl.     DR Newman PH: [phone] Fax: [phone]

## 2022-01-26 NOTE — TELEPHONE ENCOUNTER
Patient will need cardiac clearance.  This is normally requested by the surgeon.  Patient will need a stress nuclear test.  Preoperative clearance request needs to come from the surgeon's office.

## 2022-01-31 ENCOUNTER — OFFICE VISIT (OUTPATIENT)
Dept: FAMILY MEDICINE CLINIC | Facility: CLINIC | Age: 62
End: 2022-01-31

## 2022-01-31 VITALS
DIASTOLIC BLOOD PRESSURE: 64 MMHG | HEIGHT: 70 IN | RESPIRATION RATE: 16 BRPM | OXYGEN SATURATION: 94 % | SYSTOLIC BLOOD PRESSURE: 118 MMHG | BODY MASS INDEX: 29.49 KG/M2 | WEIGHT: 206 LBS | HEART RATE: 78 BPM | TEMPERATURE: 97.5 F

## 2022-01-31 DIAGNOSIS — Z01.818 PREOPERATIVE CLEARANCE: Primary | ICD-10-CM

## 2022-01-31 PROCEDURE — 99212 OFFICE O/P EST SF 10 MIN: CPT | Performed by: NURSE PRACTITIONER

## 2022-01-31 NOTE — PROGRESS NOTES
Subjective   Chemo Lin is a 61 y.o. male.     History of Present Illness   Patient presents to office for preop clearance. He is scheduled to have right rotator cuff repair per Dr. Newman.  He will have cardiology clearance on 2/17.      He sees Dr. Mendoza with endocrinology. Last A1c was December and was 7.5.  Medication was not changed, he has been doing lifestyle modifications and has f/u in March.     He reports feeling well other than shoulder pain and denies any upper respiratory symptoms.    The following portions of the patient's history were reviewed and updated as appropriate: allergies, current medications, past family history, past medical history, past social history, past surgical history and problem list.    Review of Systems   Constitutional: Negative for fatigue.   Respiratory: Negative for cough and shortness of breath.    Cardiovascular: Negative for chest pain and palpitations.   Skin: Negative for rash.   Psychiatric/Behavioral: Negative for dysphoric mood and sleep disturbance. The patient is not nervous/anxious.        Objective   Physical Exam  Vitals and nursing note reviewed.   Constitutional:       Appearance: Normal appearance. He is well-developed.   Neck:      Vascular: No carotid bruit.   Cardiovascular:      Rate and Rhythm: Normal rate and regular rhythm.   Pulmonary:      Effort: Pulmonary effort is normal.      Breath sounds: Normal breath sounds.   Neurological:      Mental Status: He is alert and oriented to person, place, and time.   Psychiatric:         Mood and Affect: Mood normal.         Behavior: Behavior normal.         Thought Content: Thought content normal.         Judgment: Judgment normal.         Assessment/Plan   Diagnoses and all orders for this visit:    1. Preoperative clearance (Primary)             discussed limiting use of Stahist as decongestants can elevated blood glucose level and reduce effectiveness of diabetic medication.       A1c is mildly  elevated but being closely monitored by endocrinology.  I see no reason he should not have surgery pending cardiology clearance.

## 2022-02-15 PROBLEM — M75.21 BICEPS TENDINITIS OF RIGHT UPPER EXTREMITY: Status: ACTIVE | Noted: 2022-02-15

## 2022-02-15 PROBLEM — M75.121 COMPLETE TEAR OF RIGHT ROTATOR CUFF: Status: ACTIVE | Noted: 2022-02-15

## 2022-02-15 PROBLEM — M75.41 SUBACROMIAL IMPINGEMENT OF RIGHT SHOULDER: Status: ACTIVE | Noted: 2022-02-15

## 2022-02-16 ENCOUNTER — PRE-ADMISSION TESTING (OUTPATIENT)
Dept: PREADMISSION TESTING | Facility: HOSPITAL | Age: 62
End: 2022-02-16

## 2022-02-16 VITALS — HEIGHT: 70 IN | WEIGHT: 204 LBS | BODY MASS INDEX: 29.2 KG/M2

## 2022-02-16 DIAGNOSIS — M75.21 BICEPS TENDINITIS OF RIGHT UPPER EXTREMITY: ICD-10-CM

## 2022-02-16 DIAGNOSIS — M75.41 SUBACROMIAL IMPINGEMENT OF RIGHT SHOULDER: ICD-10-CM

## 2022-02-16 DIAGNOSIS — M75.121 COMPLETE TEAR OF RIGHT ROTATOR CUFF, UNSPECIFIED WHETHER TRAUMATIC: ICD-10-CM

## 2022-02-16 LAB
ANION GAP SERPL CALCULATED.3IONS-SCNC: 13.5 MMOL/L (ref 5–15)
APTT PPP: 27.5 SECONDS (ref 24.3–38.1)
BASOPHILS # BLD AUTO: 0.03 10*3/MM3 (ref 0–0.2)
BASOPHILS NFR BLD AUTO: 0.4 % (ref 0–1.5)
BUN SERPL-MCNC: 24 MG/DL (ref 8–23)
BUN/CREAT SERPL: 20.3 (ref 7–25)
CALCIUM SPEC-SCNC: 9.9 MG/DL (ref 8.6–10.5)
CHLORIDE SERPL-SCNC: 98 MMOL/L (ref 98–107)
CO2 SERPL-SCNC: 27.5 MMOL/L (ref 22–29)
CREAT SERPL-MCNC: 1.18 MG/DL (ref 0.76–1.27)
DEPRECATED RDW RBC AUTO: 44.3 FL (ref 37–54)
EOSINOPHIL # BLD AUTO: 0.24 10*3/MM3 (ref 0–0.4)
EOSINOPHIL NFR BLD AUTO: 3.5 % (ref 0.3–6.2)
ERYTHROCYTE [DISTWIDTH] IN BLOOD BY AUTOMATED COUNT: 13.2 % (ref 12.3–15.4)
GFR SERPL CREATININE-BSD FRML MDRD: 63 ML/MIN/1.73
GLUCOSE SERPL-MCNC: 241 MG/DL (ref 65–99)
HBA1C MFR BLD: 7.4 % (ref 4.8–5.6)
HCT VFR BLD AUTO: 49.3 % (ref 37.5–51)
HGB BLD-MCNC: 15.9 G/DL (ref 13–17.7)
IMM GRANULOCYTES # BLD AUTO: 0.01 10*3/MM3 (ref 0–0.05)
IMM GRANULOCYTES NFR BLD AUTO: 0.1 % (ref 0–0.5)
INR PPP: 1 (ref 0.9–1.1)
LYMPHOCYTES # BLD AUTO: 0.99 10*3/MM3 (ref 0.7–3.1)
LYMPHOCYTES NFR BLD AUTO: 14.5 % (ref 19.6–45.3)
MCH RBC QN AUTO: 29.6 PG (ref 26.6–33)
MCHC RBC AUTO-ENTMCNC: 32.3 G/DL (ref 31.5–35.7)
MCV RBC AUTO: 91.8 FL (ref 79–97)
MONOCYTES # BLD AUTO: 0.53 10*3/MM3 (ref 0.1–0.9)
MONOCYTES NFR BLD AUTO: 7.8 % (ref 5–12)
NEUTROPHILS NFR BLD AUTO: 5.02 10*3/MM3 (ref 1.7–7)
NEUTROPHILS NFR BLD AUTO: 73.7 % (ref 42.7–76)
NRBC BLD AUTO-RTO: 0 /100 WBC (ref 0–0.2)
PLATELET # BLD AUTO: 209 10*3/MM3 (ref 140–450)
PMV BLD AUTO: 10.7 FL (ref 6–12)
POTASSIUM SERPL-SCNC: 4 MMOL/L (ref 3.5–5.2)
PROTHROMBIN TIME: 13.4 SECONDS (ref 12.1–15)
RBC # BLD AUTO: 5.37 10*6/MM3 (ref 4.14–5.8)
SARS-COV-2 RNA PNL SPEC NAA+PROBE: NOT DETECTED
SODIUM SERPL-SCNC: 139 MMOL/L (ref 136–145)
WBC NRBC COR # BLD: 6.82 10*3/MM3 (ref 3.4–10.8)

## 2022-02-16 PROCEDURE — 80048 BASIC METABOLIC PNL TOTAL CA: CPT | Performed by: ORTHOPAEDIC SURGERY

## 2022-02-16 PROCEDURE — 87635 SARS-COV-2 COVID-19 AMP PRB: CPT | Performed by: ORTHOPAEDIC SURGERY

## 2022-02-16 PROCEDURE — 85025 COMPLETE CBC W/AUTO DIFF WBC: CPT | Performed by: ORTHOPAEDIC SURGERY

## 2022-02-16 PROCEDURE — 85610 PROTHROMBIN TIME: CPT | Performed by: ORTHOPAEDIC SURGERY

## 2022-02-16 PROCEDURE — 83036 HEMOGLOBIN GLYCOSYLATED A1C: CPT | Performed by: ORTHOPAEDIC SURGERY

## 2022-02-16 PROCEDURE — 85730 THROMBOPLASTIN TIME PARTIAL: CPT | Performed by: ORTHOPAEDIC SURGERY

## 2022-02-16 PROCEDURE — C9803 HOPD COVID-19 SPEC COLLECT: HCPCS

## 2022-02-16 PROCEDURE — 36415 COLL VENOUS BLD VENIPUNCTURE: CPT

## 2022-02-16 NOTE — PROGRESS NOTES
"Chief Complaint  Coronary Artery Disease    Subjective    History of Present Illness      I saw Chemo Magaña today for cardiovascular care.  He is a very pleasant 61-year-old male who is to undergo right rotator cuff surgery 2/18/2022 at [facility].  Chemo has a history of coronary heart disease.  He underwent bare-metal stent deployment to the marginal branch of the circumflex 10/16/2005.  He is active and remains free of angina.  His last noninvasive ischemic assessment with nuclear stress test was 11/26/2019 which revealed preserved left ventricular function no evidence of ischemia.  Echocardiogram at that time demonstrated preserved left ventricular function no significant valvular abnormality.  He does not report any significant dyspnea on exertion remains free of orthopnea, PND or lower extremity edema.  He denies syncope near syncope or palpitations.  His blood pressure is controlled.  He has a history of hyperlipidemia and remains on rosuvastatin 40 mg daily.  He has type 2 diabetes managed with diet oral agent and insulin.    Objective   Vital Signs:   /72   Pulse 85   Ht 177.8 cm (70\")   Wt 92.1 kg (203 lb)   BMI 29.13 kg/m²     Constitutional:       Appearance: Well-developed.   Eyes:      Conjunctiva/sclera: Conjunctivae normal.      Pupils: Pupils are equal, round, and reactive to light.   HENT:      Head: Normocephalic and atraumatic.   Neck:      Thyroid: No thyromegaly.   Pulmonary:      Effort: Pulmonary effort is normal. No respiratory distress.      Breath sounds: Normal breath sounds. No wheezing. No rales.   Cardiovascular:      Normal rate. Regular rhythm.      No gallop. No S3 and S4 gallop. No rub.   Edema:     Peripheral edema absent.   Abdominal:      General: Bowel sounds are normal. There is no distension.      Palpations: Abdomen is soft. There is no abdominal mass.      Tenderness: There is no abdominal tenderness.   Musculoskeletal: Normal range of motion.      " Cervical back: Neck supple. Skin:     General: Skin is warm and dry.      Findings: No erythema.   Neurological:      Mental Status: Alert and oriented to person, place, and time.         Result Review :     Common labs    Common Labsle 2/23/21 3/22/21 2/16/22 2/16/22 2/16/22      1502 1502 1502   Glucose     241 (A)   Glucose 193 (A) 239 (A)      BUN 25 (A) 23 (A)   24 (A)   Creatinine 1.8 (A) 1.1   1.18   eGFR Non African Am     63   Sodium 142 139   139   Potassium 3.9 4.1   4.0   Chloride 100 100   98   Calcium 9.9 9.7   9.9   WBC   6.82     Hemoglobin   15.9     Hematocrit   49.3     Platelets   209     Hemoglobin A1C    7.40 (A)    (A) Abnormal value                      Assessment and Plan    1. Coronary artery disease involving native coronary artery of native heart without angina pectoris  Stable free of angina    2. History of coronary artery stent placement  Stable    3. Essential hypertension  Controlled    4. Dyslipidemia  Controlled    5. Controlled type 2 diabetes mellitus without complication, with long-term current use of insulin (HCC)      6. Family history of premature coronary heart disease  Stable    Chemo is free of angina euvolemic.  He maintains a good activity level and tolerates his medications well.  I feel he is stable from a cardiovascular standpoint to proceed with planned procedure.  I will see him in follow-up in 6 months sooner if needed.  I have counseled him on continued risk modification by observing a low-cholesterol low saturated fat diet and following his current medical regimen.        Follow Up   No follow-ups on file.  Patient was given instructions and counseling regarding his condition or for health maintenance advice. Please see specific information pulled into the AVS if appropriate.

## 2022-02-16 NOTE — DISCHARGE INSTRUCTIONS
PRE-ADMISSION TESTING INSTRUCTIONS FOR ADULTS    Take these medications the morning of surgery with a small sip of water:  Nothing     Hold diabetic meds but check you blood sugar   No aspirin, advil, aleve, ibuprofen, naproxen, diet pills, decongestants, or herbal/vitamins for a week prior to surgery.    General Instructions:    • Do not eat solid food after midnight the night before surgery.  No gum, mints, or hard candy after midnight the night before surgery.  • You may drink clear liquids the day of surgery up until 2 hours before your arrival time.  • Clear liquids are liquids you can see through. Nothing RED in color.    Plain water    Sports drinks  Sodas     Gelatin (Jell-O)  Fruit juices without pulp such as white grape juice and apple juice  Popsicles that contain no fruit or yogurt  Tea or coffee (no cream or milk added)    • It is beneficial for you to have a clear drink that contains carbohydrates just before you leave your house and before your fasting time begins.  We suggest a 20 ounce bottle of Gatorade or Powerade for non-diabetic patients or a 20 ounce bottle of G2 or Powerade Zero for diabetic patients.     • Patients who avoid smoking, chewing tobacco and alcohol for 4 weeks prior to surgery have a reduced risk of post-operative complications.  If at all possible, quit smoking as many days before surgery as you can.    • Do not smoke, use chewing tobacco or drink alcohol the day of surgery    • Bring your C-PAP/ BI-PAP machine if you use one.  • Wear clean comfortable clothes and socks.  • Do not wear contact lenses, lotion, deodorant, or make-up.  Bring a case for your glasses if applicable. You may brush your teeth the morning of surgery.  • You may wear dentures/partials, do not put adhesive/glue on them.    • Leave all other jewelry and valuables at home.      Preventing a Surgical Site Infection:    • Shower the night before and on the morning of surgery using the chlorhexidine soap you  were given.  Use a clean washcloth with the soap.  Place clean sheets on your bed after showering the night before surgery. Do not use the CHG soap on your hair, face, or private areas. Wash your body gently for five (5) minutes. Do not scrub your skin.  Dry with a clean towel and dress in clean clothing.    • Do not shave the surgical area for 10 days-2 weeks prior to surgery  because the razor can irritate skin and make it easier to develop an infection.  • Make sure you, your family, and all healthcare providers clean their hands with soap and water or an alcohol based hand  before caring for you or your wound.      Day of surgery:    Your surgeon’s office will advise you of your arrival time for the day of surgery.    Upon arrival, a Pre-op nurse and Anesthesia provider will review your health history, obtain vital signs, and answer questions you may have.  The only belongings needed at this time will be your home medications and if applicable your C-PAP/BI-PAP machine.  If you are staying overnight your family can leave the rest of your belongings in the car and bring them to your room later.  A Pre-op nurse will start an IV and you may receive medication in preparation for surgery, including something to help you relax.  Your family will be able to see you in the Pre-op area.  While you are in surgery your family should notify the waiting room  if they leave the waiting room area and provide a contact phone number.    IF you have any questions, you can call the Pre-Admission Department at [phone] or your surgeon's office.  Notify your surgeon if  you become sick, have a fever, productive cough, or cannot be here the day of surgery    Please be aware that surgery does come with discomfort.  We want to make every effort to control your discomfort so please discuss any uncontrolled symptoms with your nurse.   Your doctor will most likely have prescribed pain medications.      If you  are going home after surgery, you will receive individualized written care instructions before being discharged.  A responsible adult (over the age of 18) must drive you to and from the hospital on the day of your surgery and stay with you for 24 hours after anesthesia.    If you are staying overnight following surgery, you will be transported to your hospital room following the recovery period.  [facility] has [facility] private rooms.    You may receive a survey regarding the care you received. Your feedback is very important and will be used to collect the necessary data to help us to continue to provide excellent care.     Deductibles and co-payments are collected on the day of service. Please be prepared to pay the required co-pay, deductible or deposit on the day of service as defined by your plan.

## 2022-02-16 NOTE — PAT
Pt here for PAT visit.  Pre-op tests completed, chg soap given, and instructions reviewed.  Instructed clears until 2 hrs prior to arrival time, voiced understanding. covid complete

## 2022-02-17 ENCOUNTER — OFFICE VISIT (OUTPATIENT)
Dept: CARDIOLOGY | Facility: CLINIC | Age: 62
End: 2022-02-17

## 2022-02-17 ENCOUNTER — ANESTHESIA EVENT (OUTPATIENT)
Dept: PERIOP | Facility: HOSPITAL | Age: 62
End: 2022-02-17

## 2022-02-17 VITALS
HEART RATE: 85 BPM | SYSTOLIC BLOOD PRESSURE: 114 MMHG | DIASTOLIC BLOOD PRESSURE: 72 MMHG | WEIGHT: 203 LBS | HEIGHT: 70 IN | BODY MASS INDEX: 29.06 KG/M2

## 2022-02-17 DIAGNOSIS — Z95.5 HISTORY OF CORONARY ARTERY STENT PLACEMENT: ICD-10-CM

## 2022-02-17 DIAGNOSIS — Z82.49 FAMILY HISTORY OF PREMATURE CORONARY HEART DISEASE: ICD-10-CM

## 2022-02-17 DIAGNOSIS — I10 ESSENTIAL HYPERTENSION: ICD-10-CM

## 2022-02-17 DIAGNOSIS — E78.5 DYSLIPIDEMIA: ICD-10-CM

## 2022-02-17 DIAGNOSIS — I25.10 CORONARY ARTERY DISEASE INVOLVING NATIVE CORONARY ARTERY OF NATIVE HEART WITHOUT ANGINA PECTORIS: Primary | ICD-10-CM

## 2022-02-17 DIAGNOSIS — E11.9 CONTROLLED TYPE 2 DIABETES MELLITUS WITHOUT COMPLICATION, WITH LONG-TERM CURRENT USE OF INSULIN: ICD-10-CM

## 2022-02-17 DIAGNOSIS — Z79.4 CONTROLLED TYPE 2 DIABETES MELLITUS WITHOUT COMPLICATION, WITH LONG-TERM CURRENT USE OF INSULIN: ICD-10-CM

## 2022-02-17 PROCEDURE — 99214 OFFICE O/P EST MOD 30 MIN: CPT | Performed by: INTERNAL MEDICINE

## 2022-02-18 ENCOUNTER — HOSPITAL ENCOUNTER (OUTPATIENT)
Facility: HOSPITAL | Age: 62
Setting detail: HOSPITAL OUTPATIENT SURGERY
Discharge: HOME | End: 2022-02-18
Attending: ORTHOPAEDIC SURGERY | Admitting: ORTHOPAEDIC SURGERY

## 2022-02-18 ENCOUNTER — ANESTHESIA (OUTPATIENT)
Dept: PERIOP | Facility: HOSPITAL | Age: 62
End: 2022-02-18

## 2022-02-18 VITALS
OXYGEN SATURATION: 100 % | DIASTOLIC BLOOD PRESSURE: 87 MMHG | RESPIRATION RATE: 12 BRPM | TEMPERATURE: 98.2 F | BODY MASS INDEX: 28.93 KG/M2 | SYSTOLIC BLOOD PRESSURE: 140 MMHG | WEIGHT: 201.6 LBS | HEART RATE: 76 BPM

## 2022-02-18 DIAGNOSIS — M75.121 COMPLETE TEAR OF RIGHT ROTATOR CUFF, UNSPECIFIED WHETHER TRAUMATIC: ICD-10-CM

## 2022-02-18 DIAGNOSIS — M75.21 BICEPS TENDINITIS OF RIGHT UPPER EXTREMITY: ICD-10-CM

## 2022-02-18 DIAGNOSIS — M75.41 SUBACROMIAL IMPINGEMENT OF RIGHT SHOULDER: ICD-10-CM

## 2022-02-18 LAB
GLUCOSE BLDC GLUCOMTR-MCNC: 125 MG/DL (ref 70–130)
GLUCOSE BLDC GLUCOMTR-MCNC: 136 MG/DL (ref 70–130)

## 2022-02-18 PROCEDURE — 25010000002 EPINEPHRINE PER 0.1 MG: Performed by: ORTHOPAEDIC SURGERY

## 2022-02-18 PROCEDURE — 29827 SHO ARTHRS SRG RT8TR CUF RPR: CPT | Performed by: SPECIALIST/TECHNOLOGIST, OTHER

## 2022-02-18 PROCEDURE — 0 CEFAZOLIN SODIUM-DEXTROSE 2-3 GM-%(50ML) RECONSTITUTED SOLUTION: Performed by: ORTHOPAEDIC SURGERY

## 2022-02-18 PROCEDURE — C1713 ANCHOR/SCREW BN/BN,TIS/BN: HCPCS | Performed by: ORTHOPAEDIC SURGERY

## 2022-02-18 PROCEDURE — 25010000002 ROPIVACAINE PER 1 MG: Performed by: NURSE ANESTHETIST, CERTIFIED REGISTERED

## 2022-02-18 PROCEDURE — 29827 SHO ARTHRS SRG RT8TR CUF RPR: CPT | Performed by: ORTHOPAEDIC SURGERY

## 2022-02-18 PROCEDURE — 25010000002 ONDANSETRON PER 1 MG: Performed by: NURSE ANESTHETIST, CERTIFIED REGISTERED

## 2022-02-18 PROCEDURE — 23430 REPAIR BICEPS TENDON: CPT | Performed by: SPECIALIST/TECHNOLOGIST, OTHER

## 2022-02-18 PROCEDURE — 82962 GLUCOSE BLOOD TEST: CPT

## 2022-02-18 PROCEDURE — 25010000002 DEXAMETHASONE PER 1 MG: Performed by: NURSE ANESTHETIST, CERTIFIED REGISTERED

## 2022-02-18 PROCEDURE — C1763 CONN TISS, NON-HUMAN: HCPCS | Performed by: ORTHOPAEDIC SURGERY

## 2022-02-18 PROCEDURE — 25010000002 MIDAZOLAM PER 1MG: Performed by: NURSE ANESTHETIST, CERTIFIED REGISTERED

## 2022-02-18 PROCEDURE — 25010000002 PROPOFOL 10 MG/ML EMULSION: Performed by: NURSE ANESTHETIST, CERTIFIED REGISTERED

## 2022-02-18 PROCEDURE — 25010000002 SUCCINYLCHOLINE PER 20 MG: Performed by: NURSE ANESTHETIST, CERTIFIED REGISTERED

## 2022-02-18 PROCEDURE — 23430 REPAIR BICEPS TENDON: CPT | Performed by: ORTHOPAEDIC SURGERY

## 2022-02-18 DEVICE — ULTRABRAID 2 COBRAID 38 LENGTH SINGL
Type: IMPLANTABLE DEVICE | Site: SHOULDER | Status: FUNCTIONAL
Brand: ULTRABRAID

## 2022-02-18 DEVICE — ANCHR TNDN REGENETEN TISS/SFT EA/8: Type: IMPLANTABLE DEVICE | Site: SHOULDER | Status: FUNCTIONAL

## 2022-02-18 DEVICE — BONE ANCHORS 3 WITH ARTHROSCOPIC DELIVERY SYSTEM ADVANCED
Type: IMPLANTABLE DEVICE | Site: SHOULDER | Status: FUNCTIONAL
Brand: BONE ANCHORS WITH ARTHROSCOPIC DELIVERY SYSTEM - ADVANCED

## 2022-02-18 DEVICE — ULTRABRAID NO.2 WHITE SUTURE AND                                    NEEDLE ASSEMBLY, 38 INCH, 10 PER                                    BOX, STERILE
Type: IMPLANTABLE DEVICE | Site: SHOULDER | Status: FUNCTIONAL
Brand: ULTRABRAID

## 2022-02-18 DEVICE — HEALICOIL PK 4.5 MM SUTURE ANCHOR                                    WITH TWO ULTRABRAID NO.2 SUTURES                                    BLUE, BLUE-COBRAID STERILE
Type: IMPLANTABLE DEVICE | Site: SHOULDER | Status: FUNCTIONAL
Brand: HEALICOIL

## 2022-02-18 DEVICE — IMPLANTABLE DEVICE
Type: IMPLANTABLE DEVICE | Site: SHOULDER | Status: FUNCTIONAL
Brand: BIOINDUCTIVE IMPLANT WITH ARTHROSCOPIC DELIVERY SYSTEM - MEDIUM

## 2022-02-18 DEVICE — 2.8MM Q-FIX ALL SUTURE ANCHOR
Type: IMPLANTABLE DEVICE | Site: SHOULDER | Status: FUNCTIONAL
Brand: Q-FIX

## 2022-02-18 RX ORDER — ROPIVACAINE HYDROCHLORIDE 5 MG/ML
INJECTION, SOLUTION EPIDURAL; INFILTRATION; PERINEURAL
Status: COMPLETED | OUTPATIENT
Start: 2022-02-18 | End: 2022-02-18

## 2022-02-18 RX ORDER — SODIUM CHLORIDE, SODIUM LACTATE, POTASSIUM CHLORIDE, CALCIUM CHLORIDE 600; 310; 30; 20 MG/100ML; MG/100ML; MG/100ML; MG/100ML
9 INJECTION, SOLUTION INTRAVENOUS CONTINUOUS
Status: DISCONTINUED | OUTPATIENT
Start: 2022-02-18 | End: 2022-02-18 | Stop reason: HOSPADM

## 2022-02-18 RX ORDER — SODIUM CHLORIDE, SODIUM LACTATE, POTASSIUM CHLORIDE, CALCIUM CHLORIDE 600; 310; 30; 20 MG/100ML; MG/100ML; MG/100ML; MG/100ML
100 INJECTION, SOLUTION INTRAVENOUS CONTINUOUS
Status: DISCONTINUED | OUTPATIENT
Start: 2022-02-18 | End: 2022-02-18 | Stop reason: HOSPADM

## 2022-02-18 RX ORDER — PROPOFOL 10 MG/ML
VIAL (ML) INTRAVENOUS AS NEEDED
Status: DISCONTINUED | OUTPATIENT
Start: 2022-02-18 | End: 2022-02-18 | Stop reason: SURG

## 2022-02-18 RX ORDER — SODIUM CHLORIDE 0.9 % (FLUSH) 0.9 %
10 SYRINGE (ML) INJECTION EVERY 12 HOURS SCHEDULED
Status: DISCONTINUED | OUTPATIENT
Start: 2022-02-18 | End: 2022-02-18 | Stop reason: HOSPADM

## 2022-02-18 RX ORDER — MAGNESIUM HYDROXIDE 1200 MG/15ML
LIQUID ORAL AS NEEDED
Status: DISCONTINUED | OUTPATIENT
Start: 2022-02-18 | End: 2022-02-18 | Stop reason: HOSPADM

## 2022-02-18 RX ORDER — DEXMEDETOMIDINE HYDROCHLORIDE 100 UG/ML
INJECTION, SOLUTION INTRAVENOUS AS NEEDED
Status: DISCONTINUED | OUTPATIENT
Start: 2022-02-18 | End: 2022-02-18 | Stop reason: SURG

## 2022-02-18 RX ORDER — ONDANSETRON 2 MG/ML
4 INJECTION INTRAMUSCULAR; INTRAVENOUS ONCE AS NEEDED
Status: COMPLETED | OUTPATIENT
Start: 2022-02-18 | End: 2022-02-18

## 2022-02-18 RX ORDER — SODIUM CHLORIDE 0.9 % (FLUSH) 0.9 %
10 SYRINGE (ML) INJECTION AS NEEDED
Status: DISCONTINUED | OUTPATIENT
Start: 2022-02-18 | End: 2022-02-18 | Stop reason: HOSPADM

## 2022-02-18 RX ORDER — KETAMINE HYDROCHLORIDE 10 MG/ML
INJECTION INTRAMUSCULAR; INTRAVENOUS AS NEEDED
Status: DISCONTINUED | OUTPATIENT
Start: 2022-02-18 | End: 2022-02-18 | Stop reason: SURG

## 2022-02-18 RX ORDER — MELOXICAM 7.5 MG/1
15 TABLET ORAL ONCE
Status: COMPLETED | OUTPATIENT
Start: 2022-02-18 | End: 2022-02-18

## 2022-02-18 RX ORDER — SUCCINYLCHOLINE CHLORIDE 20 MG/ML
INJECTION INTRAMUSCULAR; INTRAVENOUS AS NEEDED
Status: DISCONTINUED | OUTPATIENT
Start: 2022-02-18 | End: 2022-02-18 | Stop reason: SURG

## 2022-02-18 RX ORDER — SODIUM CHLORIDE 9 MG/ML
INJECTION, SOLUTION INTRAVENOUS AS NEEDED
Status: DISCONTINUED | OUTPATIENT
Start: 2022-02-18 | End: 2022-02-18 | Stop reason: HOSPADM

## 2022-02-18 RX ORDER — PREGABALIN 75 MG/1
150 CAPSULE ORAL ONCE
Status: COMPLETED | OUTPATIENT
Start: 2022-02-18 | End: 2022-02-18

## 2022-02-18 RX ORDER — CEFAZOLIN SODIUM 2 G/50ML
2 SOLUTION INTRAVENOUS ONCE
Status: COMPLETED | OUTPATIENT
Start: 2022-02-18 | End: 2022-02-18

## 2022-02-18 RX ORDER — LIDOCAINE HYDROCHLORIDE AND EPINEPHRINE 20; 5 MG/ML; UG/ML
INJECTION, SOLUTION EPIDURAL; INFILTRATION; INTRACAUDAL; PERINEURAL AS NEEDED
Status: DISCONTINUED | OUTPATIENT
Start: 2022-02-18 | End: 2022-02-18 | Stop reason: HOSPADM

## 2022-02-18 RX ORDER — SODIUM CHLORIDE 9 MG/ML
40 INJECTION, SOLUTION INTRAVENOUS AS NEEDED
Status: DISCONTINUED | OUTPATIENT
Start: 2022-02-18 | End: 2022-02-18 | Stop reason: HOSPADM

## 2022-02-18 RX ORDER — ACETAMINOPHEN 500 MG
1000 TABLET ORAL ONCE
Status: COMPLETED | OUTPATIENT
Start: 2022-02-18 | End: 2022-02-18

## 2022-02-18 RX ORDER — ONDANSETRON 4 MG/1
4 TABLET, FILM COATED ORAL EVERY 8 HOURS PRN
Qty: 30 TABLET | Refills: 0 | Status: SHIPPED | OUTPATIENT
Start: 2022-02-18 | End: 2022-08-18

## 2022-02-18 RX ORDER — EPINEPHRINE 1 MG/ML
INJECTION, SOLUTION, CONCENTRATE INTRAVENOUS AS NEEDED
Status: DISCONTINUED | OUTPATIENT
Start: 2022-02-18 | End: 2022-02-18 | Stop reason: HOSPADM

## 2022-02-18 RX ORDER — FAMOTIDINE 10 MG/ML
20 INJECTION, SOLUTION INTRAVENOUS
Status: COMPLETED | OUTPATIENT
Start: 2022-02-18 | End: 2022-02-18

## 2022-02-18 RX ORDER — SENNA PLUS 8.6 MG/1
1 TABLET ORAL NIGHTLY
Qty: 20 TABLET | Refills: 0 | Status: SHIPPED | OUTPATIENT
Start: 2022-02-18 | End: 2022-03-17

## 2022-02-18 RX ORDER — LIDOCAINE HYDROCHLORIDE 10 MG/ML
0.5 INJECTION, SOLUTION EPIDURAL; INFILTRATION; INTRACAUDAL; PERINEURAL ONCE AS NEEDED
Status: DISCONTINUED | OUTPATIENT
Start: 2022-02-18 | End: 2022-02-18 | Stop reason: HOSPADM

## 2022-02-18 RX ORDER — LIDOCAINE HYDROCHLORIDE 20 MG/ML
INJECTION, SOLUTION INFILTRATION; PERINEURAL AS NEEDED
Status: DISCONTINUED | OUTPATIENT
Start: 2022-02-18 | End: 2022-02-18 | Stop reason: SURG

## 2022-02-18 RX ORDER — OXYCODONE HYDROCHLORIDE AND ACETAMINOPHEN 5; 325 MG/1; MG/1
1 TABLET ORAL EVERY 4 HOURS PRN
Qty: 42 TABLET | Refills: 0 | Status: SHIPPED | OUTPATIENT
Start: 2022-02-18 | End: 2022-03-17

## 2022-02-18 RX ORDER — ONDANSETRON 2 MG/ML
4 INJECTION INTRAMUSCULAR; INTRAVENOUS ONCE AS NEEDED
Status: DISCONTINUED | OUTPATIENT
Start: 2022-02-18 | End: 2022-02-18 | Stop reason: HOSPADM

## 2022-02-18 RX ORDER — MIDAZOLAM HYDROCHLORIDE 2 MG/2ML
1 INJECTION, SOLUTION INTRAMUSCULAR; INTRAVENOUS
Status: DISCONTINUED | OUTPATIENT
Start: 2022-02-18 | End: 2022-02-18 | Stop reason: HOSPADM

## 2022-02-18 RX ORDER — DEXAMETHASONE SODIUM PHOSPHATE 4 MG/ML
4 INJECTION, SOLUTION INTRA-ARTICULAR; INTRALESIONAL; INTRAMUSCULAR; INTRAVENOUS; SOFT TISSUE ONCE AS NEEDED
Status: COMPLETED | OUTPATIENT
Start: 2022-02-18 | End: 2022-02-18

## 2022-02-18 RX ADMIN — CEFAZOLIN SODIUM 2 G: 2 SOLUTION INTRAVENOUS at 16:26

## 2022-02-18 RX ADMIN — ONDANSETRON 4 MG: 2 INJECTION INTRAMUSCULAR; INTRAVENOUS at 15:53

## 2022-02-18 RX ADMIN — FAMOTIDINE 20 MG: 10 INJECTION, SOLUTION INTRAVENOUS at 15:53

## 2022-02-18 RX ADMIN — PROPOFOL 200 MG: 10 INJECTION, EMULSION INTRAVENOUS at 16:19

## 2022-02-18 RX ADMIN — ACETAMINOPHEN 1000 MG: 500 TABLET ORAL at 11:45

## 2022-02-18 RX ADMIN — MIDAZOLAM HYDROCHLORIDE 1 MG: 1 INJECTION, SOLUTION INTRAMUSCULAR; INTRAVENOUS at 15:40

## 2022-02-18 RX ADMIN — SUCCINYLCHOLINE CHLORIDE 200 MG: 20 INJECTION, SOLUTION INTRAMUSCULAR; INTRAVENOUS at 16:19

## 2022-02-18 RX ADMIN — KETAMINE HYDROCHLORIDE 30 MG: 10 INJECTION, SOLUTION INTRAMUSCULAR; INTRAVENOUS at 16:49

## 2022-02-18 RX ADMIN — MELOXICAM 15 MG: 7.5 TABLET ORAL at 11:45

## 2022-02-18 RX ADMIN — DEXAMETHASONE SODIUM PHOSPHATE 4 MG: 4 INJECTION, SOLUTION INTRAMUSCULAR; INTRAVENOUS at 15:59

## 2022-02-18 RX ADMIN — ROPIVACAINE HYDROCHLORIDE 20 ML: 5 INJECTION, SOLUTION EPIDURAL; INFILTRATION; PERINEURAL at 15:46

## 2022-02-18 RX ADMIN — LIDOCAINE HYDROCHLORIDE 100 MG: 20 INJECTION, SOLUTION INFILTRATION; PERINEURAL at 16:15

## 2022-02-18 RX ADMIN — PREGABALIN 150 MG: 75 CAPSULE ORAL at 11:45

## 2022-02-18 RX ADMIN — DEXAMETHASONE SODIUM PHOSPHATE 4 MG: 4 INJECTION, SOLUTION INTRAMUSCULAR; INTRAVENOUS at 15:46

## 2022-02-18 RX ADMIN — DEXMEDETOMIDINE 10 MCG: 100 INJECTION, SOLUTION, CONCENTRATE INTRAVENOUS at 15:46

## 2022-02-18 RX ADMIN — SODIUM CHLORIDE, POTASSIUM CHLORIDE, SODIUM LACTATE AND CALCIUM CHLORIDE 9 ML/HR: 600; 310; 30; 20 INJECTION, SOLUTION INTRAVENOUS at 11:44

## 2022-02-18 NOTE — ANESTHESIA PROCEDURE NOTES
Peripheral Block    Pre-sedation assessment completed: 2/18/2022 3:41 PM    Patient reassessed immediately prior to procedure    Patient location during procedure: pre-op  Start time: 2/18/2022 3:43 PM  Stop time: 2/18/2022 3:46 PM  Reason for block: at surgeon's request and post-op pain management  Performed by  CRNA: Rufus Barnett CRNA  Preanesthetic Checklist  Completed: patient identified, IV checked, site marked, risks and benefits discussed, surgical consent, monitors and equipment checked, pre-op evaluation and timeout performed  Prep:  Pt Position: supine  Sterile barriers:cap, gloves, mask and washed/disinfected hands  Prep: ChloraPrep  Patient monitoring: blood pressure monitoring, continuous pulse oximetry and EKG  Procedure    Sedation: yes  Performed under: local infiltration  Guidance:ultrasound guided    ULTRASOUND INTERPRETATION. Using ultrasound guidance a 21 G gauge needle was placed in close proximity to the nerve, at which point, under ultrasound guidance anesthetic was injected in the area of the nerve and spread of the anesthesia was seen on ultrasound in close proximity thereto.  There were no abnormalities seen on ultrasound; a digital image was taken; and the patient tolerated the procedure with no complications. Images:still images obtained, printed/placed on chart    Laterality:right  Block Type:interscalene  Injection Technique:single-shot  Needle Type:echogenic  Needle Gauge:21 G  Resistance on Injection: none    Medications Used: ropivacaine (NAROPIN) injection 0.5 %, 20 mL  Med administered at 2/18/2022 3:46 PM      Post Assessment  Injection Assessment: negative aspiration for heme, no paresthesia on injection and incremental injection  Patient Tolerance:comfortable throughout block  Complications:no

## 2022-02-18 NOTE — ANESTHESIA PROCEDURE NOTES
Airway  Urgency: elective    Date/Time: 2/18/2022 4:20 PM  End Time:2/18/2022 4:20 PM  Airway not difficult    General Information and Staff    Patient location during procedure: OR  CRNA: Anastacia Brown CRNA    Indications and Patient Condition  Indications for airway management: airway protection    Preoxygenated: yes  MILS maintained throughout  Mask difficulty assessment: 0 - not attempted    Final Airway Details  Final airway type: endotracheal airway      Successful airway: ETT  Cuffed: yes   Successful intubation technique: direct laryngoscopy  Facilitating devices/methods: intubating stylet and cricoid pressure  Endotracheal tube insertion site: oral  Blade: Lance  Blade size: 2  ETT size (mm): 7.5  Cormack-Lehane Classification: grade I - full view of glottis  Placement verified by: chest auscultation   Measured from: lips  ETT/EBT  to lips (cm): 22  Number of attempts at approach: 1  Assessment: lips, teeth, and gum same as pre-op and atraumatic intubation

## 2022-02-18 NOTE — ANESTHESIA PREPROCEDURE EVALUATION
Anesthesia Evaluation     Patient summary reviewed and Nursing notes reviewed   no history of anesthetic complications:  NPO Solid Status: > 8 hours  NPO Liquid Status: > 4 hours           Airway   Mallampati: II  TM distance: >3 FB  Neck ROM: full  No difficulty expected  Dental - normal exam     Pulmonary - negative pulmonary ROS and normal exam    breath sounds clear to auscultation  Cardiovascular - normal exam  Exercise tolerance: good (4-7 METS)    ECG reviewed  Rhythm: regular  Rate: normal    (+) hypertension, valvular problems/murmurs MR and TI, CAD, cardiac stents (About 10 years x1) more than 12 months ago hyperlipidemia,     ROS comment: EKG 11/15/21:  - NORMAL ECG -  Sinus rhythm  NON-SPECIFIC ST-T WAVE CHANGES  NO PRIOR TRACING AVAILABLE FOR COMPARISON    ECHO 11/26/19:  Interpretation Summary  ·Calculated EF = 63%  ·Mild mitral valve regurgitation is present  ·Mild tricuspid valve regurgitation is present.    STRESS TEST 11/26/19:  Interpretation Summary  ·Diaphragmatic attenuation artifact is present.  ·Left ventricular ejection fraction is normal (Calculated EF = 64%).  ·Myocardial perfusion imaging indicates a normal myocardial perfusion study with no evidence of ischemia.  ·Impressions are consistent with a low risk study.  ·Findings consistent with an equivocal ECG stress test.    Neuro/Psych- negative ROS  GI/Hepatic/Renal/Endo    (+)   diabetes mellitus type 2,     Musculoskeletal     Abdominal  - normal exam   Substance History - negative use     OB/GYN          Other   arthritis,                      Anesthesia Plan    ASA 2     general with block   (ISB)  intravenous induction     Anesthetic plan, all risks, benefits, and alternatives have been provided, discussed and informed consent has been obtained with: patient.  Use of blood products discussed with patient  Consented to blood products.       CODE STATUS:

## 2022-02-19 NOTE — ANESTHESIA POSTPROCEDURE EVALUATION
Patient: Chemo Lin    Procedure Summary     Date: 02/18/22 Room / Location:  [facility] /  [facility]    Anesthesia Start: 1613 Anesthesia Stop: 1850    Procedure: SHOULDER ARTHROSCOPY WITH ROTATOR CUFF REPAIR COMPLEX, OPEN BICEPS TENODESIS (Right Shoulder) Diagnosis:       Complete tear of right rotator cuff, unspecified whether traumatic      Subacromial impingement of right shoulder      Biceps tendinitis of right upper extremity      (Complete tear of right rotator cuff, unspecified whether traumatic [M75.121])      (Subacromial impingement of right shoulder [M75.41])      (Biceps tendinitis of right upper extremity [M75.21])    Surgeons: Kar Newman MD Provider: Anastacia Brown CRNA    Anesthesia Type: general with block ASA Status: 2          Anesthesia Type: general with block    Vitals  Vitals Value Taken Time   /82 02/18/22 1940   Temp 98.2 °F (36.8 °C) 02/18/22 1855   Pulse 78 02/18/22 1940   Resp 15 02/18/22 1935   SpO2 100 % 02/18/22 1940           Post Anesthesia Care and Evaluation    Patient location during evaluation: PHASE II  Patient participation: complete - patient participated  Level of consciousness: awake  Pain management: adequate  Airway patency: patent  Anesthetic complications: No anesthetic complications  PONV Status: none  Cardiovascular status: acceptable  Respiratory status: acceptable  Hydration status: acceptable

## 2022-02-25 ENCOUNTER — OFFICE VISIT (OUTPATIENT)
Dept: ORTHOPEDIC SURGERY | Facility: CLINIC | Age: 62
End: 2022-02-25

## 2022-02-25 VITALS — WEIGHT: 201 LBS | HEIGHT: 70 IN | BODY MASS INDEX: 28.77 KG/M2

## 2022-02-25 DIAGNOSIS — M75.21 BICEPS TENDINITIS OF RIGHT UPPER EXTREMITY: ICD-10-CM

## 2022-02-25 DIAGNOSIS — Z98.890 STATUS POST ARTHROSCOPY OF SHOULDER: Primary | ICD-10-CM

## 2022-02-25 DIAGNOSIS — M75.41 SUBACROMIAL IMPINGEMENT OF RIGHT SHOULDER: ICD-10-CM

## 2022-02-25 DIAGNOSIS — M75.121 COMPLETE TEAR OF RIGHT ROTATOR CUFF, UNSPECIFIED WHETHER TRAUMATIC: ICD-10-CM

## 2022-02-25 PROCEDURE — 99024 POSTOP FOLLOW-UP VISIT: CPT | Performed by: NURSE PRACTITIONER

## 2022-02-25 RX ORDER — PANTOPRAZOLE SODIUM 40 MG/1
40 TABLET, DELAYED RELEASE ORAL DAILY
Qty: 30 TABLET | Refills: 0 | Status: SHIPPED | OUTPATIENT
Start: 2022-02-25 | End: 2022-09-02

## 2022-02-25 NOTE — PROGRESS NOTES
CC: F/u s/p right shoulder arthroscopic rotator cuff repair with use of [employer] bio inductive implant, open biceps tenodesis DOS 02/18/2022    Interval History: Patient returns to clinic stating pain is doing fairly well, has been using sling as instructed, denies any numbness or tingling over right arm. No fevers, chills, or sweats, and no drainage from incisions noted. Reports upset stomach and abdominal pain    Exam:   Right shoulder- incisions clean, dry, sutures in place    Positive sensation all distributions right hand and proximal lateral aspect arm   Cap refill < 3 seconds, radial pulse 2+   Positive deltoid firing   Flex/extend fingers/thumb/wrist with 4+/5 strength, positive thumbs up, okay sign, cross finger adduction and abduction against resistance     Impression: s/p right shoulder arthroscopic rotator cuff repair with use of [employer] bio inductive implant, open biceps tenodesis     Plan:  1. D/c sutures today and replace with steri-strips- may shower, no submerging wounds x 4 weeks.   2. F/u in 3 weeks with Dr. Newman.   3. Will start PT at 4 wk swetha utilizing massive rotator cuff repair protocol with biceps tenodesis precautions. Continue use of sling x 6 weeks total post-op. Work on finger and wrist ROM. May do gentle elbow ROM 2x/day while out of sling for showering or changing clothes.   4. All questions answered      New Medications Ordered This Visit   Medications   • pantoprazole (PROTONIX) 40 MG EC tablet     Sig: Take 1 tablet by mouth Daily.     Dispense:  30 tablet     Refill:  0       No orders of the defined types were placed in this encounter.

## 2022-03-17 ENCOUNTER — OFFICE VISIT (OUTPATIENT)
Dept: ORTHOPEDIC SURGERY | Facility: CLINIC | Age: 62
End: 2022-03-17

## 2022-03-17 VITALS — BODY MASS INDEX: 28.77 KG/M2 | WEIGHT: 201 LBS | HEIGHT: 70 IN

## 2022-03-17 DIAGNOSIS — Z98.890 STATUS POST ARTHROSCOPY OF SHOULDER: Primary | ICD-10-CM

## 2022-03-17 PROCEDURE — 99024 POSTOP FOLLOW-UP VISIT: CPT | Performed by: ORTHOPAEDIC SURGERY

## 2022-03-17 NOTE — PROGRESS NOTES
CC: F/u s/p right shoulder massive rotator cuff repair and biceps tenodesis  DOS 2/18/2022    Interval History: Patient returns to clinic stating pain is doing fairly well, has been using sling as instructed, denies any numbness or tingling over right arm. No fevers, chills, or sweats, and no drainage from incisions noted. He has not started into physical therapy.    Exam:   Right shoulder- incisions healing well   Tolerates FF- 80,   ER- 10   Positive sensation all distributions right hand and proximal lateral aspect arm, positive deltoid firing   Cap refill < 3 seconds, radial pulse 2+   Positive deltoid firing   Flex/extend fingers/thumb/wrist with 4+/5 strength, positive thumbs up, okay sign, cross finger adduction and abduction against resistance     Impression: s/p right shoulder rotator cuff repair and biceps tenodesis     Plan:  1. Begin PT for work on ROM and progressing into strengthening per protocol  2. Continue sling x 2 weeks, may d/c at 6 week postop swetha  3. Instructed on passive ROM exercises to be done multiple times daily at home  4. F/u in 4 weeks to evaluate motion and progress with PT  5. All questions answered                    No orders of the defined types were placed in this encounter.      No orders of the defined types were placed in this encounter.

## 2022-04-14 ENCOUNTER — OFFICE VISIT (OUTPATIENT)
Dept: ORTHOPEDIC SURGERY | Facility: CLINIC | Age: 62
End: 2022-04-14

## 2022-04-14 VITALS — BODY MASS INDEX: 28.77 KG/M2 | WEIGHT: 201 LBS | HEIGHT: 70 IN

## 2022-04-14 DIAGNOSIS — Z98.890 STATUS POST ARTHROSCOPY OF SHOULDER: Primary | ICD-10-CM

## 2022-04-14 DIAGNOSIS — M75.41 SUBACROMIAL IMPINGEMENT OF RIGHT SHOULDER: ICD-10-CM

## 2022-04-14 DIAGNOSIS — M75.121 COMPLETE TEAR OF RIGHT ROTATOR CUFF, UNSPECIFIED WHETHER TRAUMATIC: ICD-10-CM

## 2022-04-14 PROCEDURE — 99024 POSTOP FOLLOW-UP VISIT: CPT | Performed by: ORTHOPAEDIC SURGERY

## 2022-04-14 RX ORDER — CYCLOBENZAPRINE HCL 5 MG
5 TABLET ORAL 3 TIMES DAILY PRN
Qty: 45 TABLET | Refills: 0 | Status: SHIPPED | OUTPATIENT
Start: 2022-04-14 | End: 2022-05-18 | Stop reason: SDUPTHER

## 2022-04-14 RX ORDER — PREDNISONE 10 MG/1
TABLET ORAL
Qty: 39 TABLET | Refills: 0 | Status: SHIPPED | OUTPATIENT
Start: 2022-04-14 | End: 2022-05-12

## 2022-04-14 NOTE — PROGRESS NOTES
CC: F/u s/p right shoulder massive rotator cuff repair and biceps tenodesis  DOS 2/18/2022     Interval History: Patient returns to clinic status post right shoulder rotator cuff repair.    The patient is doing well overall, and notes improvement with his symptoms. He is attending physical therapy 2 to 3 times weekly at [facility]. He denies any numbness or tingling. The patient reports his pain is well controlled.     Exam:              Right shoulder- incisions well-healed              FF Passive  120, Active 90   ER Passive 25, active  20              Positive sensation all distributions right hand and proximal lateral aspect arm, positive deltoid firing              Cap refill < 3 seconds, radial pulse 2+              Positive deltoid firing              Flex/extend fingers/thumb/wrist with 4+/5 strength, positive thumbs up, okay sign, cross finger adduction and abduction against resistance                Impression: s/p right shoulder rotator cuff repair and biceps tenodesis                Plan:  1.  I discussed treatment options at length with patient at today's visit.  2.  The patient is doing well and making progress. He did have a massive cuff tear, so we are going slower on his therapy, but he is still fairly stiff at this point in time.  3.  I have given him a prescription for prednisone today as well as Flexeril to try to help him do some additional flexibility for him.  4. I encouraged him to continue doing his exercises many times a day at home and also continue to work with therapy.  5. I will see him back in 4 weeks for reassessment. If he is still having issues at that time, we may consider intra-articular injection.    Transcribed from ambient dictation for aKr Newman MD by Matthew Gil Rep.  04/14/22   16:14 EDT    Patient verbalized consent to the visit recording.  I have personally performed the services described in this document as transcribed by the above individual, and it is both  accurate and complete.  Kar Newman MD  4/26/2022  23:06 EDT

## 2022-04-28 ENCOUNTER — TELEPHONE (OUTPATIENT)
Dept: CARDIOLOGY | Facility: CLINIC | Age: 62
End: 2022-04-28

## 2022-04-28 DIAGNOSIS — I25.10 CORONARY ARTERY DISEASE INVOLVING NATIVE CORONARY ARTERY OF NATIVE HEART WITHOUT ANGINA PECTORIS: Primary | ICD-10-CM

## 2022-04-29 NOTE — TELEPHONE ENCOUNTER
Yes it is fine to order stress test but patient will need to find what type of stress test is required and then you can order it.

## 2022-04-29 NOTE — TELEPHONE ENCOUNTER
Called pt and he reports a treadmill stress test in all that is required. Testing ordered. Brittney

## 2022-05-12 ENCOUNTER — OFFICE VISIT (OUTPATIENT)
Dept: ORTHOPEDIC SURGERY | Facility: CLINIC | Age: 62
End: 2022-05-12

## 2022-05-12 VITALS — WEIGHT: 201 LBS | BODY MASS INDEX: 28.77 KG/M2 | HEIGHT: 70 IN

## 2022-05-12 DIAGNOSIS — M75.01 ADHESIVE CAPSULITIS OF RIGHT SHOULDER: ICD-10-CM

## 2022-05-12 DIAGNOSIS — M75.121 COMPLETE TEAR OF RIGHT ROTATOR CUFF, UNSPECIFIED WHETHER TRAUMATIC: ICD-10-CM

## 2022-05-12 DIAGNOSIS — Z98.890 STATUS POST ARTHROSCOPY OF SHOULDER: Primary | ICD-10-CM

## 2022-05-12 PROCEDURE — 99024 POSTOP FOLLOW-UP VISIT: CPT | Performed by: ORTHOPAEDIC SURGERY

## 2022-05-12 PROCEDURE — 20610 DRAIN/INJ JOINT/BURSA W/O US: CPT | Performed by: ORTHOPAEDIC SURGERY

## 2022-05-12 RX ADMIN — LIDOCAINE HYDROCHLORIDE 4 ML: 10 INJECTION, SOLUTION EPIDURAL; INFILTRATION; INTRACAUDAL; PERINEURAL at 16:06

## 2022-05-12 RX ADMIN — TRIAMCINOLONE ACETONIDE 80 MG: 40 INJECTION, SUSPENSION INTRA-ARTICULAR; INTRAMUSCULAR at 16:06

## 2022-05-12 NOTE — PROGRESS NOTES
CC: F/u s/p right shoulder massive rotator cuff repair and biceps tenodesis  DOS 2/18/2022     Interval History: Patient returns to clinic today stating that he is doing well overall, and notes improvement in his symptoms. He reports he has completed 12 sessions of physical therapy. He notes mild tightness with range of motion.        Exam:  Right shoulder- incisions well-healed                FF- Active- 145 degrees  FF- Passive - 145 degrees  Strength- 4/5  ER- Active- 35 degrees  Strength- 4-/5  Belly press test strength- 4+/5              Positive sensation all distributions right hand and proximal lateral aspect arm, positive deltoid firing              Cap refill < 3 seconds, radial pulse 2+              Positive deltoid firing              Flex/extend fingers/thumb/wrist with 4+/5 strength, positive thumbs up, okay sign, cross finger adduction and abduction against resistance                Impression: s/p right shoulder rotator cuff repair and biceps tenodesis                   Large Joint Arthrocentesis: R glenohumeral  Date/Time: 5/12/2022 4:06 PM  Consent given by: patient  Site marked: site marked  Timeout: Immediately prior to procedure a time out was called to verify the correct patient, procedure, equipment, support staff and site/side marked as required   Supporting Documentation  Indications: pain   Procedure Details  Location: shoulder - R glenohumeral  Preparation: Patient was prepped and draped in the usual sterile fashion  Needle size: 22 G  Approach: anterior  Medications administered: 80 mg triamcinolone acetonide 40 MG/ML; 4 mL lidocaine PF 1% 1 %  Patient tolerance: patient tolerated the procedure well with no immediate complications        1. Discussed treatment options at length with patient at today's visit. Given his residual stiffness at this point in time, he would like to proceed with right shoulder glenohumeral injection and try to help advance his motion and activity.  2. .NKPL  injection right shoulder glenohumeral joint.  3. Continue working on home exercises for strength, range of motion, and function as tolerated.  4. Follow-up 3 weeks for reassessment of motion and strength. We will hold him off on work at this point in time until follow-up visit where we can reassess him at that point.    Transcribed from ambient dictation for Kar Newman MD by Josie Cullen.  05/12/22   16:41 EDT    Patient verbalized consent to the visit recording.

## 2022-05-18 RX ORDER — DICLOFENAC SODIUM 75 MG/1
75 TABLET, DELAYED RELEASE ORAL 2 TIMES DAILY
Qty: 42 TABLET | Refills: 0 | Status: SHIPPED | OUTPATIENT
Start: 2022-05-18 | End: 2022-08-18

## 2022-05-18 RX ORDER — CYCLOBENZAPRINE HCL 5 MG
5 TABLET ORAL 3 TIMES DAILY PRN
Qty: 45 TABLET | Refills: 0 | Status: SHIPPED | OUTPATIENT
Start: 2022-05-18 | End: 2022-09-02

## 2022-05-24 RX ORDER — LIDOCAINE HYDROCHLORIDE 10 MG/ML
4 INJECTION, SOLUTION EPIDURAL; INFILTRATION; INTRACAUDAL; PERINEURAL
Status: COMPLETED | OUTPATIENT
Start: 2022-05-12 | End: 2022-05-12

## 2022-05-24 RX ORDER — TRIAMCINOLONE ACETONIDE 40 MG/ML
80 INJECTION, SUSPENSION INTRA-ARTICULAR; INTRAMUSCULAR
Status: COMPLETED | OUTPATIENT
Start: 2022-05-12 | End: 2022-05-12

## 2022-05-27 ENCOUNTER — HOSPITAL ENCOUNTER (OUTPATIENT)
Dept: CARDIOLOGY | Facility: HOSPITAL | Age: 62
Discharge: HOME | End: 2022-05-27
Admitting: INTERNAL MEDICINE

## 2022-05-27 DIAGNOSIS — I25.10 CORONARY ARTERY DISEASE INVOLVING NATIVE CORONARY ARTERY OF NATIVE HEART WITHOUT ANGINA PECTORIS: ICD-10-CM

## 2022-05-27 LAB
BH CV STRESS BP STAGE 1: NORMAL
BH CV STRESS BP STAGE 2: NORMAL
BH CV STRESS BP STAGE 3: NORMAL
BH CV STRESS DURATION MIN STAGE 1: 3
BH CV STRESS DURATION MIN STAGE 2: 3
BH CV STRESS DURATION MIN STAGE 3: 2
BH CV STRESS DURATION SEC STAGE 1: 0
BH CV STRESS DURATION SEC STAGE 2: 0
BH CV STRESS DURATION SEC STAGE 3: 2
BH CV STRESS GRADE STAGE 1: 10
BH CV STRESS GRADE STAGE 2: 12
BH CV STRESS GRADE STAGE 3: 14
BH CV STRESS HR STAGE 1: 115
BH CV STRESS HR STAGE 2: 136
BH CV STRESS HR STAGE 3: 158
BH CV STRESS METS STAGE 1: 5
BH CV STRESS METS STAGE 2: 7.5
BH CV STRESS METS STAGE 3: 10
BH CV STRESS PROTOCOL 1: NORMAL
BH CV STRESS RECOVERY BP: NORMAL MMHG
BH CV STRESS RECOVERY HR: 109 BPM
BH CV STRESS SPEED STAGE 1: 1.7
BH CV STRESS SPEED STAGE 2: 2.5
BH CV STRESS SPEED STAGE 3: 3.4
BH CV STRESS STAGE 1: 1
BH CV STRESS STAGE 2: 2
BH CV STRESS STAGE 3: 3
MAXIMAL PREDICTED HEART RATE: 158 BPM
PERCENT MAX PREDICTED HR: 100 %
STRESS BASELINE BP: NORMAL MMHG
STRESS BASELINE HR: 80 BPM
STRESS PERCENT HR: 118 %
STRESS POST ESTIMATED WORKLOAD: 10.2 METS
STRESS POST EXERCISE DUR MIN: 8 MIN
STRESS POST EXERCISE DUR SEC: 3 SEC
STRESS POST PEAK BP: NORMAL MMHG
STRESS POST PEAK HR: 158 BPM
STRESS TARGET HR: 134 BPM

## 2022-05-27 PROCEDURE — 93017 CV STRESS TEST TRACING ONLY: CPT

## 2022-05-27 PROCEDURE — 93018 CV STRESS TEST I&R ONLY: CPT | Performed by: INTERNAL MEDICINE

## 2022-06-01 ENCOUNTER — TELEPHONE (OUTPATIENT)
Dept: CARDIOLOGY | Facility: CLINIC | Age: 62
End: 2022-06-01

## 2022-06-01 NOTE — TELEPHONE ENCOUNTER
22-Dr Cedric Gurrola  Pt: Chemo Lin  : 1960  Phone: [phone]  Reason for Call:  Pt. Returning call for results of stress test.

## 2022-06-02 ENCOUNTER — OFFICE VISIT (OUTPATIENT)
Dept: ORTHOPEDIC SURGERY | Facility: CLINIC | Age: 62
End: 2022-06-02

## 2022-06-02 VITALS — HEIGHT: 70 IN | WEIGHT: 201 LBS | BODY MASS INDEX: 28.77 KG/M2

## 2022-06-02 DIAGNOSIS — M75.01 ADHESIVE CAPSULITIS OF RIGHT SHOULDER: ICD-10-CM

## 2022-06-02 DIAGNOSIS — Z98.890 STATUS POST ARTHROSCOPY OF SHOULDER: Primary | ICD-10-CM

## 2022-06-02 PROCEDURE — 99212 OFFICE O/P EST SF 10 MIN: CPT | Performed by: ORTHOPAEDIC SURGERY

## 2022-06-02 NOTE — PROGRESS NOTES
Subjective:     Patient ID: Chemo Lin is a 62 y.o. male.    Chief Complaint:  Status post right shoulder massive rotator cuff repair, biceps tenodesis-2/18/2022  Plan last visit-right shoulder glenohumeral injection for adhesive capsulitis    History of Present Illness  Chemo Lin returns to clinic today for evaluation of status post right shoulder rotator cuff repair.    The patient is doing well overall and notes improvement in his symptoms. He reports mild difficulty with lifting weights due to shaking in his right upper extremity. He received an injection that seems to improve his symptoms greatly with motion and strength.      Social History     Occupational History   • Not on file   Tobacco Use   • Smoking status: Never Smoker   • Smokeless tobacco: Never Used   Vaping Use   • Vaping Use: Never used   Substance and Sexual Activity   • Alcohol use: No   • Drug use: Never   • Sexual activity: Defer      Past Medical History:   Diagnosis Date   • Allergic rhinitis    • Allergic rhinitis    • Coronary artery disease    • Diabetes mellitus, controlled ([facility])    • Diabetes mellitus, controlled ([facility])    • History of echocardiogram 4/15/2020    11/26/2019-Calculated EF = 63% Mild mitral valve regurgitation is present Mild tricuspid valve regurgitation is present.      • History of stress test 4/15/2020    11/26/2019-Diaphragmatic attenuation artifact is present. Left ventricular ejection fraction is normal (Calculated EF = 64%). Myocardial perfusion imaging indicates a normal myocardial perfusion study with no evidence of ischemia. Impressions are consistent with a low risk study. Findings consistent with an equivocal ECG stress test   • Hyperlipidemia    • Hypertension     type 2 diabetes mellitus, uncontrolled   • Tendinitis    • Tendinitis    • Type 2 diabetes mellitus (HCC)    • Type 2 diabetes mellitus (HCC)      Past Surgical History:   Procedure Laterality Date   • CATARACT EXTRACTION, BILATERAL  "    • COLONOSCOPY     • CORONARY ANGIOPLASTY WITH STENT PLACEMENT      10 years ago   • FOOT SURGERY Right    • SHOULDER ARTHROSCOPY W/ ROTATOR CUFF REPAIR Right 2/18/2022    Procedure: SHOULDER ARTHROSCOPY WITH ROTATOR CUFF REPAIR COMPLEX, OPEN BICEPS TENODESIS;  Surgeon: Kar Newman MD;  Location: [facility];  Service: Orthopedics;  Laterality: Right;       Family History   Problem Relation Age of Onset   • Hypertension Mother    • Heart disease Father    • Hypertension Father    • Lung cancer Father    • Malig Hyperthermia Neg Hx          Review of Systems        Objective:  Vitals:    06/02/22 1349   Weight: 91.2 kg (201 lb)   Height: 177.8 cm (70\")         06/02/22  1349   Weight: 91.2 kg (201 lb)     Body mass index is 28.84 kg/m².  General: No acute distress.  Resp: normal respiratory effort  Skin: no rashes or wounds; normal turgor  Psych: mood and affect appropriate; recent and remote memory intact          Ortho Exam       Right shoulder-    FF- Active- 160  Passive- 175  Strength- 4-/5  ER- Active- 40  Passive- 40  Strength- 4/5  Belly press test   Strength- 4+/5    Imaging:  None today  Assessment:        1. Status post right shoulder arthroscopic rotator cuff repair with use of [employer] bio inductive implant, open biceps tenodesis DOS 02/18/2022    2. Adhesive capsulitis of right shoulder           Plan:          1. Discussed treatment options at length with patient at today's visit.   2. The patient is doing well at this point in time. He is making good progress with his range of motion and his strength is slowly progressing.  3. He can return to work at this point in time as he will be able to modify restrictions on his own per his report.  4. Follow up: 2 months for reassessment of strength and function at that time.      Chemo Lin was in agreement with plan and had all questions answered.     Orders:  No orders of the defined types were placed in this encounter.      Medications:  No " orders of the defined types were placed in this encounter.      Followup:  No follow-ups on file.    Diagnoses and all orders for this visit:    1. Status post right shoulder arthroscopic rotator cuff repair with use of [employer] bio inductive implant, open biceps tenodesis DOS 02/18/2022 (Primary)    2. Adhesive capsulitis of right shoulder          Dictated utilizing Dragon dictation       Transcribed from ambient dictation for Kar Newman MD by JAYJAY PFEIFFER.  06/02/22   15:50 EDT    Patient verbalized consent to the visit recording.

## 2022-08-11 ENCOUNTER — OFFICE VISIT (OUTPATIENT)
Dept: ORTHOPEDIC SURGERY | Facility: CLINIC | Age: 62
End: 2022-08-11

## 2022-08-11 VITALS — WEIGHT: 201 LBS | BODY MASS INDEX: 28.77 KG/M2 | HEIGHT: 70 IN

## 2022-08-11 DIAGNOSIS — Z98.890 STATUS POST ARTHROSCOPY OF SHOULDER: Primary | ICD-10-CM

## 2022-08-11 DIAGNOSIS — M75.01 ADHESIVE CAPSULITIS OF RIGHT SHOULDER: ICD-10-CM

## 2022-08-11 PROCEDURE — 99212 OFFICE O/P EST SF 10 MIN: CPT | Performed by: ORTHOPAEDIC SURGERY

## 2022-08-11 NOTE — PROGRESS NOTES
Subjective:     Patient ID: Chemo Lin is a 62 y.o. male.    Chief Complaint:  Follow-up status post right shoulder massive rotator cuff repair, biceps tenodesis-2/18/2022    History of Present Illness  Chemo Lin returns to clinic today for evaluation of right shoulder rotator cuff tear. The patient reports that he is doing well and making significant progress from his physical therapy, but states that he is no longer attending physical therapy at this point in time. He is still experiencing strength deficits, but is improving since his last visit. The patient reports he has been lifting 5 pounds. He notes he has not been bathing his shoulder.     Social History     Occupational History   • Not on file   Tobacco Use   • Smoking status: Never Smoker   • Smokeless tobacco: Never Used   Vaping Use   • Vaping Use: Never used   Substance and Sexual Activity   • Alcohol use: No   • Drug use: Never   • Sexual activity: Defer      Past Medical History:   Diagnosis Date   • Allergic rhinitis    • Allergic rhinitis    • Coronary artery disease    • Diabetes mellitus, controlled ([facility])    • Diabetes mellitus, controlled ([facility])    • History of echocardiogram 4/15/2020    11/26/2019-Calculated EF = 63% Mild mitral valve regurgitation is present Mild tricuspid valve regurgitation is present.      • History of stress test 4/15/2020    11/26/2019-Diaphragmatic attenuation artifact is present. Left ventricular ejection fraction is normal (Calculated EF = 64%). Myocardial perfusion imaging indicates a normal myocardial perfusion study with no evidence of ischemia. Impressions are consistent with a low risk study. Findings consistent with an equivocal ECG stress test   • Hyperlipidemia    • Hypertension     type 2 diabetes mellitus, uncontrolled   • Tendinitis    • Tendinitis    • Type 2 diabetes mellitus (HCC)    • Type 2 diabetes mellitus (HCC)      Past Surgical History:   Procedure Laterality Date   • CATARACT  "EXTRACTION, BILATERAL     • COLONOSCOPY     • CORONARY ANGIOPLASTY WITH STENT PLACEMENT      10 years ago   • FOOT SURGERY Right    • SHOULDER ARTHROSCOPY W/ ROTATOR CUFF REPAIR Right 2/18/2022    Procedure: SHOULDER ARTHROSCOPY WITH ROTATOR CUFF REPAIR COMPLEX, OPEN BICEPS TENODESIS;  Surgeon: Kar Newman MD;  Location: [facility];  Service: Orthopedics;  Laterality: Right;       Family History   Problem Relation Age of Onset   • Hypertension Mother    • Heart disease Father    • Hypertension Father    • Lung cancer Father    • Malig Hyperthermia Neg Hx          Review of Systems   Constitutional: Negative for chills, diaphoresis, fever and unexpected weight change.   HENT: Negative for hearing loss, nosebleeds, sore throat and tinnitus.    Eyes: Negative for pain and visual disturbance.   Respiratory: Negative for cough, shortness of breath and wheezing.    Cardiovascular: Negative for chest pain and palpitations.   Gastrointestinal: Negative for abdominal pain, diarrhea, nausea and vomiting.   Endocrine: Negative for cold intolerance, heat intolerance and polydipsia.   Genitourinary: Negative for difficulty urinating, dysuria and hematuria.   Musculoskeletal: Negative for arthralgias, joint swelling and myalgias.   Skin: Negative for rash and wound.   Allergic/Immunologic: Negative for environmental allergies.   Neurological: Negative for dizziness, syncope and numbness.   Hematological: Does not bruise/bleed easily.   Psychiatric/Behavioral: Negative for dysphoric mood and sleep disturbance. The patient is not nervous/anxious.            Objective:  Vitals:    08/11/22 1254   Weight: 91.2 kg (201 lb)   Height: 177.8 cm (70\")         08/11/22  1254   Weight: 91.2 kg (201 lb)     Body mass index is 28.84 kg/m².  General: No acute distress.  Resp: normal respiratory effort  Skin: no rashes or wounds; normal turgor  Psych: mood and affect appropriate; recent and remote memory intact          Ortho Exam   "     Right shoulder-  Active forward flexion- 175 degrees  Strength- 4/5  External rotation- Active- 35 degrees  Strength- 4/5  Belly press test strength- 4+/5    Imaging:  None today  Assessment:        1. Status post right shoulder arthroscopic rotator cuff repair with use of [employer] bio inductive implant, open biceps tenodesis DOS 02/18/2022    2. Adhesive capsulitis of right shoulder           Plan:          1. Discussed treatment options at length with patient at today's visit.   2. Overall, the patient is doing fairly well at this point in time. He has made good progress with his physical therapy regimen working on strength. He still has some strength deficits, but it is improving in comparison to last visit.  3. Follow up: 6 months with repeat x-rays of right shoulder at that time.         Chemo Lin was in agreement with plan and had all questions answered.     Orders:  No orders of the defined types were placed in this encounter.      Medications:  No orders of the defined types were placed in this encounter.      Followup:  Return in about 6 months (around 2/11/2023) for xrays needed at follow up.    Diagnoses and all orders for this visit:    1. Status post right shoulder arthroscopic rotator cuff repair with use of [employer] bio inductive implant, open biceps tenodesis DOS 02/18/2022 (Primary)    2. Adhesive capsulitis of right shoulder          Dictated utilizing Dragon dictation     Transcribed from ambient dictation for Kar Newman MD by Yareli Magaña.  08/11/22   14:49 EDT    Patient verbalized consent to the visit recording.  I have personally performed the services described in this document as transcribed by the above individual, and it is both accurate and complete.  Kar Newman MD  8/16/2022  18:01 EDT

## 2022-08-17 NOTE — PROGRESS NOTES
"Chief Complaint  Coronary Artery Disease    Subjective    History of Present Illness      I saw Chemo Lin today for cardiovascular care.  He is a pleasant and active 62-year-old male.  He has known coronary heart disease status post bare-metal stent deployment to the marginal branch of the circumflex in 2005.  He is free of chest pain pressure or tightness.  He does not experience any significant or limiting dyspnea on exertion.  He has remained free of orthopnea, PND or lower extremity edema.  He denies syncope near syncope or significant palpitations.  He underwent stress EKG testing 5/27/2022.He achieved 100% of maximum predicted heart rate response.  There was no symptoms of angina or significant ST-T wave changes of ischemia.  He has a history of hyperlipidemia and remains on Zetia 10 mg daily and rosuvastatin 40 mg daily.  His lipids are monitored by his endocrinologist.    Objective   Vital Signs:   /70   Pulse 75   Ht 177.8 cm (70\")   Wt 92.1 kg (203 lb)   BMI 29.13 kg/m²     Constitutional:       Appearance: Well-developed.   Eyes:      Conjunctiva/sclera: Conjunctivae normal.      Pupils: Pupils are equal, round, and reactive to light.   HENT:      Head: Normocephalic and atraumatic.   Neck:      Thyroid: No thyromegaly.   Pulmonary:      Effort: Pulmonary effort is normal. No respiratory distress.      Breath sounds: Normal breath sounds. No wheezing. No rales.   Cardiovascular:      Normal rate. Regular rhythm.      No gallop. No S3 and S4 gallop. No rub.   Edema:     Peripheral edema absent.   Abdominal:      General: Bowel sounds are normal. There is no distension.      Palpations: Abdomen is soft. There is no abdominal mass.      Tenderness: There is no abdominal tenderness.   Musculoskeletal: Normal range of motion.      Cervical back: Neck supple. Skin:     General: Skin is warm and dry.      Findings: No erythema.   Neurological:      Mental Status: Alert and oriented to person, " place, and time.         Result Review :     Common labs    Common Labsle 2/16/22 2/16/22 2/16/22 4/29/22    1502 1502 1502    Glucose   241 (A)    Glucose    116 (A)   BUN   24 (A) 20   Creatinine   1.18 1.02   eGFR Non African Am   63    Sodium   139 141   Potassium   4.0 4.1   Chloride   98 102   Calcium   9.9 9.0   Albumin    4.3   Total Bilirubin    0.5   Alkaline Phosphatase    62   AST (SGOT)    26   ALT (SGPT)    38   WBC 6.82      Hemoglobin 15.9      Hematocrit 49.3      Platelets 209      Hemoglobin A1C  7.40 (A)     (A) Abnormal value                      Assessment and Plan    1. Coronary artery disease involving native coronary artery of native heart without angina pectoris  Stable    2. History of coronary artery stent placement  Stable    3. Essential hypertension  Controlled    4. Dyslipidemia  Continue rosuvastatin and Zetia    Chemo feels well and remains active.  He is free of angina and euvolemic on examination.  We stressed importance of ongoing risk modification which he understands.  We will arrange for follow-up in 6 months.        Follow Up   No follow-ups on file.  Patient was given instructions and counseling regarding his condition or for health maintenance advice. Please see specific information pulled into the AVS if appropriate.

## 2022-08-18 ENCOUNTER — OFFICE VISIT (OUTPATIENT)
Dept: CARDIOLOGY | Facility: CLINIC | Age: 62
End: 2022-08-18

## 2022-08-18 VITALS
HEIGHT: 70 IN | HEART RATE: 75 BPM | DIASTOLIC BLOOD PRESSURE: 70 MMHG | BODY MASS INDEX: 29.06 KG/M2 | WEIGHT: 203 LBS | SYSTOLIC BLOOD PRESSURE: 108 MMHG

## 2022-08-18 DIAGNOSIS — R06.89 RESPIRATORY INSUFFICIENCY: ICD-10-CM

## 2022-08-18 DIAGNOSIS — I10 ESSENTIAL HYPERTENSION: Chronic | ICD-10-CM

## 2022-08-18 DIAGNOSIS — Z95.5 HISTORY OF CORONARY ARTERY STENT PLACEMENT: Chronic | ICD-10-CM

## 2022-08-18 DIAGNOSIS — I25.10 CORONARY ARTERY DISEASE INVOLVING NATIVE CORONARY ARTERY OF NATIVE HEART WITHOUT ANGINA PECTORIS: Primary | Chronic | ICD-10-CM

## 2022-08-18 DIAGNOSIS — E78.5 DYSLIPIDEMIA: Chronic | ICD-10-CM

## 2022-08-18 PROCEDURE — 99214 OFFICE O/P EST MOD 30 MIN: CPT | Performed by: INTERNAL MEDICINE

## 2022-09-02 ENCOUNTER — OFFICE VISIT (OUTPATIENT)
Dept: FAMILY MEDICINE CLINIC | Facility: CLINIC | Age: 62
End: 2022-09-02

## 2022-09-02 VITALS
DIASTOLIC BLOOD PRESSURE: 67 MMHG | HEART RATE: 78 BPM | RESPIRATION RATE: 16 BRPM | BODY MASS INDEX: 29.49 KG/M2 | TEMPERATURE: 97.7 F | SYSTOLIC BLOOD PRESSURE: 110 MMHG | HEIGHT: 70 IN | WEIGHT: 206 LBS | OXYGEN SATURATION: 96 %

## 2022-09-02 DIAGNOSIS — L23.7 CONTACT DERMATITIS DUE TO POISON IVY: Primary | ICD-10-CM

## 2022-09-02 PROCEDURE — 99213 OFFICE O/P EST LOW 20 MIN: CPT

## 2022-09-02 NOTE — PROGRESS NOTES
"Chief Complaint  Poison Ivy    Subjective          History of Present Illness    Chemo Lin 62 y.o. male who presents today for a poison ivy rash.  Symptom onset was on Sunday.  He reports he was cleaning the yard and was exposed to poison ivy.  Rash is located on both legs, buttock, left arm, and lower back.  He reports associated itching.  No drainage and no bleeding.  Evaluation to date: none.  Treatment to date: none.     He  denies medication side effects.    Review of Systems   Constitutional: Negative for appetite change, chills, fatigue and fever.   HENT: Negative for congestion, sinus pressure and trouble swallowing.    Eyes: Negative for visual disturbance.   Respiratory: Negative for cough, chest tightness, shortness of breath and wheezing.    Cardiovascular: Negative for chest pain, palpitations and leg swelling.   Gastrointestinal: Negative for abdominal pain, constipation, diarrhea, nausea and vomiting.   Genitourinary: Negative for dysuria, frequency and urgency.   Musculoskeletal: Negative for gait problem, myalgias and neck pain.   Skin: Positive for rash.   Neurological: Negative for dizziness, syncope, weakness and light-headedness.   Psychiatric/Behavioral: Negative for dysphoric mood. The patient is not nervous/anxious.         Objective   Vital Signs:   /67   Pulse 78   Temp 97.7 °F (36.5 °C)   Resp 16   Ht 177.8 cm (70\")   Wt 93.4 kg (206 lb)   SpO2 96%   BMI 29.56 kg/m²      BMI is >= 25 and <30. (Overweight) The following options were offered after discussion;: exercise counseling/recommendations and nutrition counseling/recommendations        Physical Exam  Vitals and nursing note reviewed.   Constitutional:       General: He is not in acute distress.     Appearance: Normal appearance. He is well-developed. He is not diaphoretic.   HENT:      Head: Normocephalic and atraumatic.      Right Ear: External ear normal.      Left Ear: External ear normal.   Eyes:      " General: No scleral icterus.        Right eye: No discharge.         Left eye: No discharge.      Conjunctiva/sclera: Conjunctivae normal.      Pupils: Pupils are equal, round, and reactive to light.   Cardiovascular:      Pulses: Normal pulses.   Pulmonary:      Effort: Pulmonary effort is normal.   Musculoskeletal:         General: Normal range of motion.      Cervical back: Normal range of motion and neck supple.   Skin:     General: Skin is warm and dry.      Findings: Rash present. Rash is macular and papular.      Comments: Maculopapular rash located on both legs, buttock, left arm, and lower back.  Most areas are dried and starting to scab over.  No drainage and no bleeding.   Neurological:      Mental Status: He is alert and oriented to person, place, and time.      Motor: No abnormal muscle tone.   Psychiatric:         Behavior: Behavior normal.         Thought Content: Thought content normal.         Judgment: Judgment normal.                         Assessment and Plan      Diagnoses and all orders for this visit:    1. Contact dermatitis due to poison ivy (Primary)  Comments:  Triamcinolone as directed  Keep clean and dry, daily antihistamine, cool compresses  Return if no improvement  Orders:  -     triamcinolone (KENALOG) 0.1 % ointment; Apply 1 application topically to the appropriate area as directed 2 (Two) Times a Day.  Dispense: 80 g; Refill: 1            Follow Up     Return if symptoms worsen or fail to improve.    Patient was given instructions and counseling regarding his condition or for health maintenance advice. Please see specific information pulled into the AVS if appropriate.     -Return if no improvement.

## 2022-11-17 ENCOUNTER — DOCUMENTATION (OUTPATIENT)
Dept: PHYSICAL THERAPY | Facility: CLINIC | Age: 62
End: 2022-11-17

## 2022-11-17 NOTE — PROGRESS NOTES
PT Discharge Summary    Chemo Lin was seen for 3 physical therapy sessions for right shoulder strain and left wrist strain.  Treatment included therapeutic exercise, manual therapy, electrical stimulation  and patient education with home exercise program Progress to physical therapy goals was good.  He returned to Occupation [employer] and did not return to PT. He is discharged at this time.       STG (2 weeks)  1.  Pt will be independent with initial HEP for improved right shoulder and left wrist/thumb mobility with decreased pain. MET  2.  Pt will demonstrate bilaterally equal shoulder ROM for ease with all shoulder tasks. MET  3.  Pt will report decreased pain at the worst from 8/10 to 4/10 with ADL's and functional tasks. MET  4.  Pt will be able to perform all self care tasks with UE's without increased pain. MET    LTGs (4 weeks)  1.  Pt will be independent with bilateral UE strengthening and stabilization exercises for ease with return to work duties. MET  2.  Pt will demonstrate 5/5 right shoulder and left wrist strength for stability with UE tasks. Partially MET  3.  Pt will return to regular job duties. NOT ASSSESSED  4.  Pt will report improved perceived function via Quick DASH from 68.18 to 40 or less disability.  NOT ASSSESSED

## 2022-12-08 ENCOUNTER — OFFICE VISIT (OUTPATIENT)
Dept: DIABETES SERVICES | Facility: HOSPITAL | Age: 62
End: 2022-12-08
Payer: COMMERCIAL

## 2022-12-08 VITALS
OXYGEN SATURATION: 96 % | WEIGHT: 202 LBS | TEMPERATURE: 97.6 F | HEIGHT: 71 IN | HEART RATE: 78 BPM | BODY MASS INDEX: 28.28 KG/M2 | DIASTOLIC BLOOD PRESSURE: 85 MMHG | SYSTOLIC BLOOD PRESSURE: 122 MMHG

## 2022-12-08 DIAGNOSIS — E11.65 UNCONTROLLED TYPE 2 DIABETES MELLITUS WITH HYPERGLYCEMIA: Primary | ICD-10-CM

## 2022-12-08 DIAGNOSIS — E66.3 OVERWEIGHT (BMI 25.0-29.9): ICD-10-CM

## 2022-12-08 DIAGNOSIS — N52.9 ERECTILE DYSFUNCTION, UNSPECIFIED ERECTILE DYSFUNCTION TYPE: ICD-10-CM

## 2022-12-08 LAB
EXPIRATION DATE: ABNORMAL
GLUCOSE BLDC GLUCOMTR-MCNC: 158 MG/DL (ref 70–99)
HBA1C MFR BLD: 7.6 %
Lab: ABNORMAL

## 2022-12-08 PROCEDURE — 99204 OFFICE O/P NEW MOD 45 MIN: CPT | Performed by: NURSE PRACTITIONER

## 2022-12-08 PROCEDURE — G0463 HOSPITAL OUTPT CLINIC VISIT: HCPCS | Performed by: NURSE PRACTITIONER

## 2022-12-08 PROCEDURE — 82962 GLUCOSE BLOOD TEST: CPT | Performed by: NURSE PRACTITIONER

## 2022-12-08 NOTE — PROGRESS NOTES
Chief Complaint  Diabetes (New pt, est care with diabetic provider, a1c trevino, )    Referred By: No Known Provider    Subjective          Chemo Lin presents to [facility] for diabetes medication management    History of Present Illness    Visit type:  to establish care  Diabetes type:  Type 2  Age at time of dx/Year of dx/Number of years:  6 years  Family History of Diabetes: Paternal grandmother and possible aunts and uncles  Current diabetes status/concerns/issues: He is self-referred to our office today for assistance in managing his diabetes.  He has been previously under the care of another diabetes specialist but wants something more local.  He denies any major concerns regarding his diabetes today other than some nocturnal hypoglycemia.  Other current health concerns: He does have some problems keeping his cholesterol under control  Diabetes symptoms:    Polyuria: Yes   Polydipsia: No   Polyphagia: No   Blurred vision: No   Excessive fatigue: Yes  Diabetes complications:  Neuro: None  Renal: None  Eyes: None  Amputation/Wounds: None  GI: None  Cardiovascular: Hypertension, hyperlipidemia, coronary artery disease with history of coronary stent placement  ED: He has used viagra but c/o headaches following use  Other: None  Hospitalizations/ED/911 secondary to DM?  No  Hypoglycemia:  Level 1 hypoglycemia (54 mg/dL - 70 mg/dL); Frequency - He reports having glucose levels in the 50s at times.  They are occurring with less frequency now and Nocturnal - Most episodes occurred during the overnight hours  Hypoglycemia Symptoms:  shaking/tremors and weakness  Current Diabetes treatment: Glipizide XL 10 mg once a day, Jardiance 10 mg once a day, metformin 1000 mg twice a day (he states he was on this previously but stopped it thinking it was the source of some swelling in his knees but recently restarted it about 1 year ago), Tresiba 51 units each day  Prior diabetes treatments:   none  Blood glucose device:  Meter  Blood glucose monitoring frequency:  1 - 2  Blood glucose range/average:   fasting; daytime 120 before meals; 280 after meals  Dietary behavior:  Avoids sugary drinks, Number of meals each day - 3; Number of snacks each day - occasional, History of Diabetes Nutrition Counseling - YES, he did not have good experience  Activity/Exercise:  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      • History of stress test 04/15/2020    11/26/2019-Diaphragmatic attenuation artifact is present. Left ventricular ejection fraction is normal (Calculated EF = 64%). Myocardial perfusion imaging indicates a normal myocardial perfusion study with no evidence of ischemia. Impressions are consistent with a low risk study. Findings consistent with an equivocal ECG stress test   • Hyperlipidemia    • Hypertension     type 2 diabetes mellitus, uncontrolled   • Tendinitis    • Type 2 diabetes mellitus (HCC)      Past Surgical History:   Procedure Laterality Date   • CATARACT EXTRACTION, BILATERAL     • COLONOSCOPY     • CORONARY ANGIOPLASTY WITH STENT PLACEMENT      10 years ago   • FOOT SURGERY Right     great toe joint repair   • SHOULDER ARTHROSCOPY W/ ROTATOR CUFF REPAIR Right 02/18/2022    Procedure: SHOULDER ARTHROSCOPY WITH ROTATOR CUFF REPAIR COMPLEX, OPEN BICEPS TENODESIS;  Surgeon: Kar Newman MD;  Location: [facility];  Service: Orthopedics;  Laterality: Right;     Family History   Problem Relation Age of Onset   • Hypertension  Mother    • Heart disease Father    • Hypertension Father    • Lung cancer Father    • Diabetes Paternal Grandmother    • Malig Hyperthermia Neg Hx      Social History     Socioeconomic History   • Marital status:    Tobacco Use   • Smoking status: Never   • Smokeless tobacco: Never   Vaping Use   • Vaping Use: Never used   Substance and Sexual Activity   • Alcohol use: No   • Drug use: Never   • Sexual activity: Defer     No Known Allergies    Current Outpatient Medications:   •  aspirin 81 MG tablet, Take 1 tablet by mouth daily., Disp: , Rfl:   •  BD PEN NEEDLE SAVANA U/F 32G X 4 MM misc, USE AS DIRECTED ONCE DAILY FOR INSULIN INJECTIONS, Disp: 100 each, Rfl: 0  •  cetirizine (zyrTEC) 5 MG tablet, Take 5 mg by mouth As Needed., Disp: , Rfl:   •  fluticasone (FLONASE) 50 MCG/ACT nasal spray, 1 spray into the nostril(s) as directed by provider Daily., Disp: , Rfl:   •  metFORMIN ER (GLUCOPHAGE-XR) 500 MG 24 hr tablet, Take 1,000 mg by mouth 2 (two) times a day., Disp: , Rfl:   •  ONE TOUCH ULTRA TEST test strip, , Disp: , Rfl:   •  ONETOUCH DELICA LANCETS 33G misc, USE  PIECE TO CHECK GLUCOSE TWICE DAILY, Disp: 200 each, Rfl: 0  •  valsartan (DIOVAN) 40 MG tablet, TAKE 1 TABLET BY MOUTH EVERY DAY, Disp: , Rfl:   •  azithromycin (Zithromax Z-Gurpreet) 250 MG tablet, Take 2 tablets the first day, then 1 tablet daily for 4 days., Disp: 6 tablet, Rfl: 0  •  Chlorcyclizine-Pseudoephed (Stahist AD) 25-60 MG tablet, Take 1 tablet by mouth 2 (Two) Times a Day As Needed (sinus congestion)., Disp: 60 tablet, Rfl: 5  •  chlorthalidone (HYGROTON) 25 MG tablet, Take 1 tablet by mouth daily., Disp: 90 tablet, Rfl: 0  •  ezetimibe (ZETIA) 10 MG tablet, Take 1 tablet by mouth Daily., Disp: 90 tablet, Rfl: 3  •  glipizide (GLUCOTROL XL) 10 MG 24 hr tablet, TAKE ONE TABLET BY MOUTH EVERY DAY, Disp: 90 tablet, Rfl: 0  •  Jardiance 10 MG tablet tablet, Take 1 tablet by mouth daily., Disp: 90 tablet, Rfl: 0  •  montelukast (Singulair)  10 MG tablet, Take 1 tablet by mouth Every Night for 14 days., Disp: 14 tablet, Rfl: 0  •  rosuvastatin (CRESTOR) 40 MG tablet, Take 1 tablet by mouth Daily., Disp: 90 tablet, Rfl: 3  •  Tresiba FlexTouch 100 UNIT/ML solution pen-injector injection, Inject 51 Units under the skin into the appropriate area as directed Daily., Disp: 15 mL, Rfl: 5  •  triamcinolone (KENALOG) 0.1 % ointment, Apply 1 application topically to the appropriate area as directed 2 (Two) Times a Day., Disp: 80 g, Rfl: 1  •  triamcinolone (KENALOG) 0.1 % ointment, Apply topically to the appropriate area as directed twice daily, Disp: 80 g, Rfl: 0    Review of Systems   Constitutional: Positive for fatigue. Negative for activity change, appetite change, unexpected weight gain and unexpected weight loss.   Eyes: Negative for blurred vision and visual disturbance.   Gastrointestinal: Negative for abdominal pain, constipation, diarrhea, nausea, vomiting, GERD and indigestion.   Endocrine: Positive for polyuria. Negative for polydipsia and polyphagia.   Neurological: Negative for numbness.        Objective     Vitals:    12/08/22 0847   BP: 122/85   BP Location: Right arm   Patient Position: Sitting   Cuff Size: Adult   Pulse: 78   Temp: 97.6 °F (36.4 °C)   SpO2: 96%   Weight: 91.6 kg (202 lb)   Height: 180.3 cm (71\")   PainSc: 0-No pain     Body mass index is 28.17 kg/m².    Physical Exam  Constitutional:       Appearance: Normal appearance.      Comments: Overweight with BMI of 28.59   HENT:      Head: Normocephalic and atraumatic.      Right Ear: External ear normal.      Left Ear: External ear normal.      Nose: Nose normal.   Eyes:      Extraocular Movements: Extraocular movements intact.      Conjunctiva/sclera: Conjunctivae normal.   Pulmonary:      Effort: Pulmonary effort is normal.   Musculoskeletal:         General: Normal range of motion.      Cervical back: Normal range of motion.   Skin:     General: Skin is warm and dry.    Neurological:      General: No focal deficit present.      Mental Status: He is alert and oriented to person, place, and time. Mental status is at baseline.   Psychiatric:         Mood and Affect: Mood normal.         Behavior: Behavior normal.         Thought Content: Thought content normal.         Judgment: Judgment normal.         Result Review :   The following data was reviewed by: VESNA Salter on 12/08/2022:    Most Recent A1C    HGBA1C Most Recent 12/8/22   Hemoglobin A1C 7.6             A1C Last 3 Results    HGBA1C Last 3 Results 2/16/22 12/8/22   Hemoglobin A1C 7.40 (A) 7.6   (A) Abnormal value            Point-of-care A1c in the office was 7.6% indicating uncontrolled type 2 diabetes.  This is up slightly from a prior result collected in February of this year.    Glucose   Date Value Ref Range Status   12/08/2022 158 (H) 70 - 99 mg/dL Final     Comment:     Serial Number: [id number]:  [id number]     Point-of-care glucose in the office today is within normal limits for nonfasting glucose          Assessment: Patient's A1c is above target.  He feels like he could benefit from seeing the dietitian for nutrition counseling to help him control glucose levels more effectively.  He is concerned about his erectile dysfunction and states the current medication he is using is causing problematic headaches.      Diagnoses and all orders for this visit:    1. Uncontrolled type 2 diabetes mellitus with hyperglycemia (HCC) (Primary)  -     POC Glucose  -     POC Glycosylated Hemoglobin (Hb A1C)  -     Ambulatory [facility] to [facility]    2. Erectile dysfunction, unspecified erectile dysfunction type  -     Ambulatory Referral to Urology    3. Overweight (BMI 25.0-29.9)        Plan: The patient will be referred to see the dietitian for nutrition counseling.  He will focus on dietary strategies to help control glucose levels.  We will also make referral to urology services for  evaluation of his erectile dysfunction.  No changes were made to his treatment plan today.    The patient will monitor his blood glucose levels 2-3 times each day.  If he develops problematic hyperglycemia or hypoglycemia or adverse drug reactions, he will contact the office for further instructions.        Follow Up     Return in about 3 months (around 3/8/2023) for Medication Management, DMNT.    Patient was given instructions and counseling regarding his condition or for health maintenance advice. Please see specific information pulled into the AVS if appropriate.     Kelly Quintero, VESNA  12/08/2022      Dictated Utilizing Dragon Dictation.  Please note that portions of this note were completed with a voice recognition program.  Part of this note may be an electronic transcription/translation of spoken language to printed text using the Dragon Dictation System.

## 2022-12-15 DIAGNOSIS — Z12.5 SPECIAL SCREENING FOR MALIGNANT NEOPLASM OF PROSTATE: ICD-10-CM

## 2022-12-15 DIAGNOSIS — Z00.00 ANNUAL PHYSICAL EXAM: Primary | ICD-10-CM

## 2022-12-18 LAB
ALBUMIN SERPL-MCNC: 4.3 G/DL (ref 3.8–4.8)
ALBUMIN/GLOB SERPL: 2 {RATIO} (ref 1.2–2.2)
ALP SERPL-CCNC: 73 IU/L (ref 44–121)
ALT SERPL-CCNC: 41 IU/L (ref 0–44)
AST SERPL-CCNC: 25 IU/L (ref 0–40)
BASOPHILS # BLD AUTO: 0.1 X10E3/UL (ref 0–0.2)
BASOPHILS NFR BLD AUTO: 1 %
BILIRUB SERPL-MCNC: 0.4 MG/DL (ref 0–1.2)
BUN SERPL-MCNC: 15 MG/DL (ref 8–27)
BUN/CREAT SERPL: 15 (ref 10–24)
CALCIUM SERPL-MCNC: 9.1 MG/DL (ref 8.6–10.2)
CHLORIDE SERPL-SCNC: 104 MMOL/L (ref 96–106)
CO2 SERPL-SCNC: 26 MMOL/L (ref 20–29)
CREAT SERPL-MCNC: 0.99 MG/DL (ref 0.76–1.27)
EGFRCR SERPLBLD CKD-EPI 2021: 86 ML/MIN/1.73
EOSINOPHIL # BLD AUTO: 0.4 X10E3/UL (ref 0–0.4)
EOSINOPHIL NFR BLD AUTO: 8 %
ERYTHROCYTE [DISTWIDTH] IN BLOOD BY AUTOMATED COUNT: 12.5 % (ref 11.6–15.4)
GLOBULIN SER CALC-MCNC: 2.2 G/DL (ref 1.5–4.5)
GLUCOSE SERPL-MCNC: 119 MG/DL (ref 70–99)
HCT VFR BLD AUTO: 45.4 % (ref 37.5–51)
HGB BLD-MCNC: 15.1 G/DL (ref 13–17.7)
IMM GRANULOCYTES # BLD AUTO: 0 X10E3/UL (ref 0–0.1)
IMM GRANULOCYTES NFR BLD AUTO: 0 %
LYMPHOCYTES # BLD AUTO: 1.3 X10E3/UL (ref 0.7–3.1)
LYMPHOCYTES NFR BLD AUTO: 23 %
MCH RBC QN AUTO: 29.7 PG (ref 26.6–33)
MCHC RBC AUTO-ENTMCNC: 33.3 G/DL (ref 31.5–35.7)
MCV RBC AUTO: 89 FL (ref 79–97)
MONOCYTES # BLD AUTO: 0.5 X10E3/UL (ref 0.1–0.9)
MONOCYTES NFR BLD AUTO: 10 %
NEUTROPHILS # BLD AUTO: 3.3 X10E3/UL (ref 1.4–7)
NEUTROPHILS NFR BLD AUTO: 58 %
PLATELET # BLD AUTO: 207 X10E3/UL (ref 150–450)
POTASSIUM SERPL-SCNC: 3.9 MMOL/L (ref 3.5–5.2)
PROT SERPL-MCNC: 6.5 G/DL (ref 6–8.5)
PSA SERPL-MCNC: 0.6 NG/ML (ref 0–4)
RBC # BLD AUTO: 5.08 X10E6/UL (ref 4.14–5.8)
SODIUM SERPL-SCNC: 144 MMOL/L (ref 134–144)
TSH SERPL DL<=0.005 MIU/L-ACNC: 1.12 UIU/ML (ref 0.45–4.5)
WBC # BLD AUTO: 5.6 X10E3/UL (ref 3.4–10.8)

## 2022-12-20 ENCOUNTER — NUTRITION (OUTPATIENT)
Dept: DIABETES SERVICES | Facility: HOSPITAL | Age: 62
End: 2022-12-20
Payer: COMMERCIAL

## 2022-12-20 DIAGNOSIS — E11.65 UNCONTROLLED TYPE 2 DIABETES MELLITUS WITH HYPERGLYCEMIA: Primary | ICD-10-CM

## 2022-12-20 PROCEDURE — 97802 MEDICAL NUTRITION INDIV IN: CPT | Performed by: DIETITIAN, REGISTERED

## 2022-12-22 ENCOUNTER — OFFICE VISIT (OUTPATIENT)
Dept: FAMILY MEDICINE CLINIC | Facility: CLINIC | Age: 62
End: 2022-12-22

## 2022-12-22 VITALS
HEART RATE: 86 BPM | HEIGHT: 71 IN | TEMPERATURE: 97.4 F | SYSTOLIC BLOOD PRESSURE: 125 MMHG | RESPIRATION RATE: 16 BRPM | BODY MASS INDEX: 28.84 KG/M2 | DIASTOLIC BLOOD PRESSURE: 74 MMHG | WEIGHT: 206 LBS

## 2022-12-22 DIAGNOSIS — E78.00 PURE HYPERCHOLESTEROLEMIA: ICD-10-CM

## 2022-12-22 DIAGNOSIS — Z00.00 ANNUAL PHYSICAL EXAM: Primary | ICD-10-CM

## 2022-12-22 DIAGNOSIS — N52.9 ERECTILE DYSFUNCTION, UNSPECIFIED ERECTILE DYSFUNCTION TYPE: ICD-10-CM

## 2022-12-22 PROCEDURE — 99396 PREV VISIT EST AGE 40-64: CPT | Performed by: FAMILY MEDICINE

## 2022-12-22 RX ORDER — EZETIMIBE 10 MG/1
10 TABLET ORAL DAILY
Qty: 90 TABLET | Refills: 3 | Status: SHIPPED | OUTPATIENT
Start: 2022-12-22

## 2022-12-22 RX ORDER — ROSUVASTATIN CALCIUM 40 MG/1
40 TABLET, COATED ORAL DAILY
Qty: 90 TABLET | Refills: 3 | Status: SHIPPED | OUTPATIENT
Start: 2022-12-22

## 2022-12-22 NOTE — PROGRESS NOTES
Subjective   Chemo Lin is a 62 y.o. male.     CC: Annual Exam    History of Present Illness     Chemo Lin 62 y.o. male who presents for an Annual Wellness Visit.  he has a history of   Patient Active Problem List   Diagnosis   • Hyperuricemia   • Low testosterone   • Essential hypertension   • Vitamin D deficiency   • Dyslipidemia   • Diabetes mellitus type 2, controlled, without complications ([facility])   • ED (erectile dysfunction)   • CAD (coronary artery disease)   • History of coronary artery stent placement   • Respiratory insufficiency   • Family history of premature coronary heart disease   • Hand arthritis   • History of stress test   • History of echocardiogram   • Complete tear of right rotator cuff   • Subacromial impingement of right shoulder   • Biceps tendinitis of right upper extremity   • Status post right shoulder arthroscopic rotator cuff repair with use of [employer] bio inductive implant, open biceps tenodesis DOS 02/18/2022   .  he has been feeling well.   I  reviewed health maintenance with him as part of my preventative care plan.    The following portions of the patient's history were reviewed and updated as appropriate: allergies, current medications, past family history, past medical history, past social history, past surgical history and problem list.    Review of Systems   Constitutional: Negative for appetite change, fever and unexpected weight change.   HENT: Negative for ear pain, facial swelling and sore throat.    Eyes: Negative for pain and visual disturbance.   Respiratory: Negative for chest tightness, shortness of breath and wheezing.    Cardiovascular: Negative for chest pain and palpitations.   Gastrointestinal: Negative for abdominal pain and blood in stool.   Endocrine: Negative.    Genitourinary: Negative for difficulty urinating and hematuria.   Musculoskeletal: Negative for joint swelling.   Neurological: Negative for tremors, seizures and syncope.  "  Hematological: Negative for adenopathy.   Psychiatric/Behavioral: Negative.        /74   Pulse 86   Temp 97.4 °F (36.3 °C) (Oral)   Resp 16   Ht 180.3 cm (71\")   Wt 93.4 kg (206 lb)   BMI 28.73 kg/m²     Objective   Physical Exam  Vitals and nursing note reviewed.   Constitutional:       General: He is not in acute distress.     Appearance: He is well-developed. He is not diaphoretic.   HENT:      Head: Normocephalic and atraumatic. Hair is normal.      Right Ear: Hearing, tympanic membrane, ear canal and external ear normal.      Left Ear: Hearing, tympanic membrane, ear canal and external ear normal.      Nose: No nasal deformity.      Mouth/Throat:      Mouth: No oral lesions.      Pharynx: Uvula midline. No uvula swelling.   Eyes:      General: Lids are normal. No scleral icterus.        Right eye: No discharge.         Left eye: No discharge.      Extraocular Movements:      Right eye: Normal extraocular motion and no nystagmus.      Left eye: Normal extraocular motion and no nystagmus.      Conjunctiva/sclera: Conjunctivae normal.      Pupils: Pupils are equal, round, and reactive to light.   Neck:      Thyroid: No thyromegaly.      Vascular: No JVD.      Trachea: No tracheal deviation.   Cardiovascular:      Rate and Rhythm: Normal rate and regular rhythm.      Pulses: Normal pulses.      Heart sounds: Normal heart sounds. No murmur heard.    No gallop.   Pulmonary:      Effort: Pulmonary effort is normal. No respiratory distress.      Breath sounds: Normal breath sounds. No wheezing or rales.   Chest:      Chest wall: No tenderness.   Abdominal:      General: Bowel sounds are normal. There is no distension.      Palpations: Abdomen is soft. There is no mass.      Tenderness: There is no abdominal tenderness. There is no guarding.      Hernia: No hernia is present.   Genitourinary:     Prostate: Normal.      Rectum: Normal.   Musculoskeletal:         General: No tenderness or deformity. Normal " range of motion.      Cervical back: Normal range of motion and neck supple.   Lymphadenopathy:      Cervical: No cervical adenopathy.   Skin:     General: Skin is warm and dry.      Findings: No rash.   Neurological:      Mental Status: He is alert and oriented to person, place, and time.      Cranial Nerves: No cranial nerve deficit.      Motor: No abnormal muscle tone.      Coordination: Coordination normal.      Deep Tendon Reflexes: Reflexes are normal and symmetric. Reflexes normal.   Psychiatric:         Behavior: Behavior normal.         Thought Content: Thought content normal.         Judgment: Judgment normal.     Labs reviewed with pt today during visit. All questions answered.      Assessment & Plan   Diagnoses and all orders for this visit:    1. Annual physical exam (Primary)    2. Erectile dysfunction, unspecified erectile dysfunction type  -     Ambulatory Referral to Urology    3. Pure hypercholesterolemia  -     ezetimibe (ZETIA) 10 MG tablet; Take 1 tablet by mouth Daily.  Dispense: 90 tablet; Refill: 3  -     rosuvastatin (CRESTOR) 40 MG tablet; Take 1 tablet by mouth Daily.  Dispense: 90 tablet; Refill: 3    Healthy diet/exercise/weight management discussed with pt.

## 2023-01-03 ENCOUNTER — OFFICE VISIT (OUTPATIENT)
Dept: FAMILY MEDICINE CLINIC | Facility: CLINIC | Age: 63
End: 2023-01-03
Payer: COMMERCIAL

## 2023-01-03 VITALS
DIASTOLIC BLOOD PRESSURE: 75 MMHG | BODY MASS INDEX: 28.7 KG/M2 | SYSTOLIC BLOOD PRESSURE: 113 MMHG | TEMPERATURE: 97.6 F | WEIGHT: 205 LBS | HEART RATE: 86 BPM | RESPIRATION RATE: 16 BRPM | HEIGHT: 71 IN | OXYGEN SATURATION: 95 %

## 2023-01-03 DIAGNOSIS — J06.9 UPPER RESPIRATORY TRACT INFECTION DUE TO COVID-19 VIRUS: Primary | ICD-10-CM

## 2023-01-03 DIAGNOSIS — U07.1 UPPER RESPIRATORY TRACT INFECTION DUE TO COVID-19 VIRUS: Primary | ICD-10-CM

## 2023-01-03 LAB
EXPIRATION DATE: ABNORMAL
FLUAV AG UPPER RESP QL IA.RAPID: NOT DETECTED
FLUBV AG UPPER RESP QL IA.RAPID: NOT DETECTED
INTERNAL CONTROL: ABNORMAL
Lab: ABNORMAL
SARS-COV-2 AG UPPER RESP QL IA.RAPID: DETECTED

## 2023-01-03 PROCEDURE — 99213 OFFICE O/P EST LOW 20 MIN: CPT | Performed by: FAMILY MEDICINE

## 2023-01-03 PROCEDURE — 87428 SARSCOV & INF VIR A&B AG IA: CPT | Performed by: FAMILY MEDICINE

## 2023-01-03 RX ORDER — MONTELUKAST SODIUM 10 MG/1
10 TABLET ORAL NIGHTLY
Qty: 14 TABLET | Refills: 0 | Status: SHIPPED | OUTPATIENT
Start: 2023-01-03 | End: 2023-01-17

## 2023-01-03 RX ORDER — AZITHROMYCIN 250 MG/1
TABLET, FILM COATED ORAL
Qty: 6 TABLET | Refills: 0 | Status: SHIPPED | OUTPATIENT
Start: 2023-01-03 | End: 2023-02-14

## 2023-01-03 NOTE — PROGRESS NOTES
Subjective   Chemo Lin is a 62 y.o. male.     CC: URI-Sx    History of Present Illness     Pt comes in today reporting a roughly 4 day h/o cough/congestion/sinus pressure/pain. No dyspnea. No f/c.        The following portions of the patient's history were reviewed and updated as appropriate: allergies, current medications, past family history, past medical history, past social history, past surgical history and problem list.    Review of Systems   Constitutional: Negative for activity change, chills and fever.   HENT: Positive for congestion and sinus pressure.    Respiratory: Positive for cough.    Cardiovascular: Negative for chest pain.   Psychiatric/Behavioral: Negative for dysphoric mood.       /75   Pulse 86   Temp 97.6 °F (36.4 °C) (Oral)   Resp 16   Ht 180.3 cm (71\")   Wt 93 kg (205 lb)   SpO2 95%   BMI 28.59 kg/m²     Objective   Physical Exam  Constitutional:       General: He is not in acute distress.     Appearance: He is well-developed.   Cardiovascular:      Rate and Rhythm: Normal rate and regular rhythm.   Pulmonary:      Effort: Pulmonary effort is normal.      Breath sounds: Normal breath sounds.   Neurological:      Mental Status: He is alert and oriented to person, place, and time.   Psychiatric:         Behavior: Behavior normal.         Thought Content: Thought content normal.     COVID/FLU RAST: POSITIVE COVID    Assessment & Plan   Diagnoses and all orders for this visit:    1. Upper respiratory tract infection due to COVID-19 virus (Primary)  -     POCT SARS-CoV-2 Antigen STORM + Flu  -     azithromycin (Zithromax Z-Gurpreet) 250 MG tablet; Take 2 tablets the first day, then 1 tablet daily for 4 days.  Dispense: 6 tablet; Refill: 0  -     montelukast (Singulair) 10 MG tablet; Take 1 tablet by mouth Every Night for 14 days.  Dispense: 14 tablet; Refill: 0    Script to CC at 40mg x 7 days.

## 2023-01-03 NOTE — LETTER
January 3, 2023     Patient: Chemo Lin   YOB: 1960   Date of Visit: 1/3/2023       To Whom It May Concern:    It is my medical opinion that Chemo Lin may return to work on Monday, January 9th.           Sincerely,        Roly Barreto MD

## 2023-01-05 ENCOUNTER — TELEPHONE (OUTPATIENT)
Dept: DIABETES SERVICES | Facility: HOSPITAL | Age: 63
End: 2023-01-05
Payer: COMMERCIAL

## 2023-01-05 DIAGNOSIS — E11.8 TYPE 2 DIABETES MELLITUS WITH UNSPECIFIED COMPLICATIONS: ICD-10-CM

## 2023-01-05 DIAGNOSIS — I10 ESSENTIAL (PRIMARY) HYPERTENSION: ICD-10-CM

## 2023-01-05 RX ORDER — EMPAGLIFLOZIN 10 MG/1
TABLET, FILM COATED ORAL DAILY
Qty: 90 TABLET | Refills: 0 | Status: SHIPPED | OUTPATIENT
Start: 2023-01-05 | End: 2023-03-14

## 2023-01-05 RX ORDER — CHLORTHALIDONE 25 MG/1
TABLET ORAL
Qty: 90 TABLET | Refills: 0 | Status: SHIPPED | OUTPATIENT
Start: 2023-01-05

## 2023-01-05 RX ORDER — GLIPIZIDE 10 MG/1
TABLET, FILM COATED, EXTENDED RELEASE ORAL
Qty: 90 TABLET | Refills: 0 | Status: SHIPPED | OUTPATIENT
Start: 2023-01-05 | End: 2023-03-14 | Stop reason: SDUPTHER

## 2023-01-06 RX ORDER — INSULIN DEGLUDEC INJECTION 100 U/ML
51 INJECTION, SOLUTION SUBCUTANEOUS DAILY
Qty: 15 ML | Refills: 5 | Status: SHIPPED | OUTPATIENT
Start: 2023-01-06

## 2023-01-12 VITALS — HEIGHT: 71 IN | WEIGHT: 207.45 LBS | BODY MASS INDEX: 29.04 KG/M2

## 2023-01-12 NOTE — PROGRESS NOTES
"  Chemo Lin is a 62 y.o. male who presents to [facility] for nutrition consult r/t diagnosis of T2DM.  Pt states he was diagnosed 5 years ago.  Chemo Lin is referred by VESNA Lynch.    Past Medical History:   Diagnosis Date   • Allergic rhinitis    • Coronary artery disease    • History of echocardiogram 04/15/2020    11/26/2019-Calculated EF = 63% Mild mitral valve regurgitation is present Mild tricuspid valve regurgitation is present.      • History of stress test 04/15/2020    11/26/2019-Diaphragmatic attenuation artifact is present. Left ventricular ejection fraction is normal (Calculated EF = 64%). Myocardial perfusion imaging indicates a normal myocardial perfusion study with no evidence of ischemia. Impressions are consistent with a low risk study. Findings consistent with an equivocal ECG stress test   • Hyperlipidemia    • Hypertension     type 2 diabetes mellitus, uncontrolled   • Tendinitis    • Type 2 diabetes mellitus (HCC)        Anthropometrics    180.3 cm (71\")  94.1 kg (207 lb 7.3 oz)  28.93 kg/m²    Diabetes History    Diabetes History  What type of diabetes do you have?: Type 2  Length of Diabetes Diagnosis: 1 - 5 years  Current DM knowledge: good  Have you had diabetes education/teaching in the past?: yes  Do you test your blood sugar at home?: yes  Frequency of checks: 1x/day  Who performs the test?: self  Typical readings: fasting 100-120, lowest fasting reading 60, pt states this will happen if he does not have bedtime snack    Education Preferences    Education Preferences  What areas of diabetes would you like to learn about?: diet information    Nutrition Information    Nutrition Information  Enter everything you can remember eating in the last 24 hours (1 day): breakfast- sausage biscuit; lunch- various fast food, chicken sandwich or tenders, +/- fries; dinner- grilled meat, potatoes, green beans; snacks- snack bar; beverages- diet " soda, water  What is the biggest challenge you have with your diet?: Knowledge    Education Needs    DM Education Needs  Meter: Has own  Medication: Oral, Insulin  Healthy Eating: RD consult, Reviewed meal plan, Basic meal plan provided  Motivation: Engaged  Teaching Method: Explanation, Discussion, Handouts  Patient Response: Verbalized understanding    DM Goals    DM Goals  Healthy Eating - Goal: Today  Being Active - Goal: Today  Taking Medication - Goal: Today  Monitoring - Goal: Today  Contact Plan: Follow-up medical care      Medications    Current Outpatient Medications   Medication   • aspirin   • azithromycin   • BD Pen Needle Beth U/F   • cetirizine   • Stahist AD   • chlorthalidone   • ezetimibe   • fluticasone   • glipizide   • Jardiance   • metFORMIN ER   • montelukast   • ONE TOUCH ULTRA TEST   • OneTouch Delica Lancets 33G   • rosuvastatin   • Tresiba FlexTouch   • triamcinolone   • triamcinolone   • valsartan     Pt reports taking Tresiba (51 units-am), metformin XR (500 mg BID), glipizide, and Jardiance for glucose mgmt.    Labs         Lab Results   Component Value Date    TRIG 117 12/30/2019    HDL 33 (L) 12/30/2019     (H) 12/30/2019 December 8, 2022- A1c 7.6%    Nutrition counseling provided on carbohydrate counting, portion control, measuring and reading labels.  Discussed eating out and gave suggestions on controlling carbohydrate intake and making healthier food choices.     Meal Plan:     Total Carbohydrates per meal: 3-4 carb servings/meal, at least 3 meals/day  Lean protein with meals.  Limit added fats.  Snacks: 1 carbohydrate serving (</= 22 g) + 1 protein serving.     Daily exercise encouraged (as recommended by healthcare provider). Discussed the benefits of exercise in lowering blood glucose, blood pressure, cholesterol, stress and controlling body weight.     Advised to continue daily blood glucose monitoring to assist with understanding of factors affecting blood glucose  and assist with management of diabetes.  Discussed and provided with target BG ranges.     Literature provided: Diabetes Nutrition Placemat, Choose Your Foods Booklet    Dietitian contact number provided.  Patient encouraged to call with questions or concerns.     Time spent with patient: 60 minutes    Annette Trinidad RDN, LD  12/20/2022

## 2023-02-14 ENCOUNTER — OFFICE VISIT (OUTPATIENT)
Dept: UROLOGY | Facility: CLINIC | Age: 63
End: 2023-02-14
Payer: COMMERCIAL

## 2023-02-14 VITALS — HEIGHT: 71 IN | BODY MASS INDEX: 28.42 KG/M2 | WEIGHT: 203 LBS

## 2023-02-14 DIAGNOSIS — N52.9 ERECTILE DYSFUNCTION, UNSPECIFIED ERECTILE DYSFUNCTION TYPE: Primary | ICD-10-CM

## 2023-02-14 PROCEDURE — 99214 OFFICE O/P EST MOD 30 MIN: CPT | Performed by: NURSE PRACTITIONER

## 2023-02-14 RX ORDER — TADALAFIL 5 MG/1
TABLET ORAL
Qty: 30 TABLET | Refills: 11 | Status: SHIPPED | OUTPATIENT
Start: 2023-02-14

## 2023-02-14 NOTE — PROGRESS NOTES
Chief Complaint: Erectile Dysfunction    Subjective         History of Present Illness  Chemo Lin is a 62 y.o. male presents to [facility] to be seen for Erectile Dysfunction.    Patient presents reporting he has had trouble with ED for several years. He has used sildenafil - reports this will work at times, but other times does not work and he does have a headache the next morning after using. Has not tried any other medications.     Denies Dysuria    Denies Frequency    Nocturia x 1 at times    Denies Urgency    Denies incontinence     Family hx of  malignancy - brother with prostate CA at age 60       surgeries -denies     Previous medications tried- sildenafil    PSA    12/17/2022 0.6  8/6/2020 0.626  1/15/2018 0.584  2/6/2016 0.9      Objective     Past Medical History:   Diagnosis Date   • Allergic rhinitis    • Coronary artery disease    • History of echocardiogram 04/15/2020    11/26/2019-Calculated EF = 63% Mild mitral valve regurgitation is present Mild tricuspid valve regurgitation is present.      • History of stress test 04/15/2020    11/26/2019-Diaphragmatic attenuation artifact is present. Left ventricular ejection fraction is normal (Calculated EF = 64%). Myocardial perfusion imaging indicates a normal myocardial perfusion study with no evidence of ischemia. Impressions are consistent with a low risk study. Findings consistent with an equivocal ECG stress test   • Hyperlipidemia    • Hypertension     type 2 diabetes mellitus, uncontrolled   • Tendinitis    • Type 2 diabetes mellitus (HCC)        Past Surgical History:   Procedure Laterality Date   • CATARACT EXTRACTION, BILATERAL     • COLONOSCOPY     • CORONARY ANGIOPLASTY WITH STENT PLACEMENT      10 years ago   • FOOT SURGERY Right     great toe joint repair   • SHOULDER ARTHROSCOPY W/ ROTATOR CUFF REPAIR Right 02/18/2022    Procedure: SHOULDER ARTHROSCOPY WITH ROTATOR CUFF REPAIR COMPLEX, OPEN BICEPS  CLARA;  Surgeon: Kar Newman MD;  Location: [facility];  Service: Orthopedics;  Laterality: Right;         Current Outpatient Medications:   •  aspirin 81 MG tablet, Take 1 tablet by mouth daily., Disp: , Rfl:   •  BD PEN NEEDLE SAVANA U/F 32G X 4 MM misc, USE AS DIRECTED ONCE DAILY FOR INSULIN INJECTIONS, Disp: 100 each, Rfl: 0  •  cetirizine (zyrTEC) 5 MG tablet, Take 5 mg by mouth As Needed., Disp: , Rfl:   •  Chlorcyclizine-Pseudoephed (Stahist AD) 25-60 MG tablet, Take 1 tablet by mouth 2 (Two) Times a Day As Needed (sinus congestion)., Disp: 60 tablet, Rfl: 5  •  chlorthalidone (HYGROTON) 25 MG tablet, Take 1 tablet by mouth daily., Disp: 90 tablet, Rfl: 0  •  ezetimibe (ZETIA) 10 MG tablet, Take 1 tablet by mouth Daily., Disp: 90 tablet, Rfl: 3  •  fluticasone (FLONASE) 50 MCG/ACT nasal spray, 1 spray into the nostril(s) as directed by provider Daily., Disp: , Rfl:   •  glipizide (GLUCOTROL XL) 10 MG 24 hr tablet, TAKE ONE TABLET BY MOUTH EVERY DAY, Disp: 90 tablet, Rfl: 0  •  Jardiance 10 MG tablet tablet, Take 1 tablet by mouth daily., Disp: 90 tablet, Rfl: 0  •  metFORMIN ER (GLUCOPHAGE-XR) 500 MG 24 hr tablet, Take 1,000 mg by mouth 2 (two) times a day., Disp: , Rfl:   •  montelukast (Singulair) 10 MG tablet, Take 1 tablet by mouth Every Night for 14 days., Disp: 14 tablet, Rfl: 0  •  ONE TOUCH ULTRA TEST test strip, , Disp: , Rfl:   •  ONETOUCH DELICA LANCETS 33G misc, USE  PIECE TO CHECK GLUCOSE TWICE DAILY, Disp: 200 each, Rfl: 0  •  rosuvastatin (CRESTOR) 40 MG tablet, Take 1 tablet by mouth Daily., Disp: 90 tablet, Rfl: 3  •  tadalafil (CIALIS) 5 MG tablet, Take 1-4 tablets prior to sexual activity., Disp: 30 tablet, Rfl: 11  •  Tresiba FlexTouch 100 UNIT/ML solution pen-injector injection, Inject 51 Units under the skin into the appropriate area as directed Daily., Disp: 15 mL, Rfl: 5  •  triamcinolone (KENALOG) 0.1 % ointment, Apply 1 application topically to the appropriate area as  "directed 2 (Two) Times a Day., Disp: 80 g, Rfl: 1  •  valsartan (DIOVAN) 40 MG tablet, TAKE 1 TABLET BY MOUTH EVERY DAY, Disp: , Rfl:     No Known Allergies     Family History   Problem Relation Age of Onset   • Hypertension Mother    • Heart disease Father    • Hypertension Father    • Lung cancer Father    • Diabetes Paternal Grandmother    • Malig Hyperthermia Neg Hx        Social History     Socioeconomic History   • Marital status:    Tobacco Use   • Smoking status: Never   • Smokeless tobacco: Never   Vaping Use   • Vaping Use: Never used   Substance and Sexual Activity   • Alcohol use: No   • Drug use: Never   • Sexual activity: Defer       Vital Signs:   Ht 180.3 cm (70.98\")   Wt 92.1 kg (203 lb)   BMI 28.33 kg/m²      Physical Exam     Result Review :   The following data was reviewed by: VESNA Moses on 02/14/2023:  Results for orders placed or performed in visit on 01/03/23   POCT SARS-CoV-2 Antigen STORM + Flu    Specimen: Swab   Result Value Ref Range    SARS Antigen Detected (A) Not Detected, Presumptive Negative    Influenza A Antigen STORM Not Detected Not Detected    Influenza B Antigen STORM Not Detected Not Detected    Internal Control Passed Passed    Lot Number [id number]     Expiration Date 03/07/2024       PSA    PSA 12/17/22   PSA 0.6      Comments are available for some flowsheets but are not being displayed.               Procedures        Assessment and Plan    Diagnoses and all orders for this visit:    1. Erectile dysfunction, unspecified erectile dysfunction type (Primary)  -     tadalafil (CIALIS) 5 MG tablet; Take 1-4 tablets prior to sexual activity.  Dispense: 30 tablet; Refill: 11    Discussed options of taking tadalafil daily which may help with some of the mild urinary urgency he experiences at times, but he does not want to add a daily medication. Patient would like to try tadalafil PRN and if this is not effective he will consider Trimix. Discussed risks of " nitroglycerin with these medications and he reports that he does not have NTG at home. Advised that if this should change, he should not take these medications. Discussed that he should start with 5mg and increase the dose with next use if not effective.   He is advised that if he has an erection that lasts more than 4 hours he should go to the ER or risk never having another erection. He will follow up in 3 months or sooner for new concerns.       Follow Up   No follow-ups on file.  Patient was given instructions and counseling regarding his condition or for health maintenance advice. Please see specific information pulled into the AVS if appropriate.         This document has been electronically signed by VESNA Moses  February 14, 2023 09:20 EST

## 2023-03-13 NOTE — PROGRESS NOTES
Chief Complaint  Diabetes    Referred By: Roly Barreto MD    Subjective          Chemo Lin presents to [facility] for diabetes medication management    History of Present Illness    Visit type:  follow-up  Diabetes type:  Type 2  Current diabetes status/concerns/issues:  He has been seen by the dietitian.  He is trying to watch eat.  No concerns  Other health concerns: none  Current Diabetes symptoms:    Polyuria: No   Polydipsia: No   Polyphagia: No   Blurred vision: No   Excessive fatigue: No  Known Diabetes complications:  Neuro: None  Renal: None  Eyes: None  Amputation/Wounds: None  GI: None  Cardiovascular: Hypertension, hyperlipidemia, coronary artery disease with history of coronary stent placement  ED: He has used viagra but c/o headaches following use  Other: None  Hypoglycemia:  None reported at this time  Hypoglycemia Symptoms:  No hypoglycemia at this time  Current diabetes treatment:  Glipizide XL 10 mg once a day, Jardiance 10 mg once a day, metformin 1000 mg twice a day, Tresiba 51 units each day  Blood glucose device:  Meter  Blood glucose monitoring frequency:  1 - 2  Blood glucose range/average:  14 day average is 116 with glucose levels ranging between   Glucose Source: Device Reviewed  Diet:  Limits high carb/sweet foods, Avoids sugary drinks  Activity/Exercise:  more active at work but no exercise    Past Medical History:   Diagnosis Date   • Allergic rhinitis    • Coronary artery disease    • History of echocardiogram 04/15/2020    11/26/2019-Calculated EF = 63% Mild mitral valve regurgitation is present Mild tricuspid valve regurgitation is present.      • History of stress test 04/15/2020    11/26/2019-Diaphragmatic attenuation artifact is present. Left ventricular ejection fraction is normal (Calculated EF = 64%). Myocardial perfusion imaging indicates a normal myocardial perfusion study with no evidence of ischemia. Impressions are  consistent with a low risk study. Findings consistent with an equivocal ECG stress test   • Hyperlipidemia    • Hypertension     type 2 diabetes mellitus, uncontrolled   • Tendinitis    • Type 2 diabetes mellitus (HCC)      Past Surgical History:   Procedure Laterality Date   • CATARACT EXTRACTION, BILATERAL     • COLONOSCOPY     • CORONARY ANGIOPLASTY WITH STENT PLACEMENT      10 years ago   • FOOT SURGERY Right     great toe joint repair   • SHOULDER ARTHROSCOPY W/ ROTATOR CUFF REPAIR Right 02/18/2022    Procedure: SHOULDER ARTHROSCOPY WITH ROTATOR CUFF REPAIR COMPLEX, OPEN BICEPS TENODESIS;  Surgeon: aKr Newman MD;  Location: [facility];  Service: Orthopedics;  Laterality: Right;     Family History   Problem Relation Age of Onset   • Hypertension Mother    • Heart disease Father    • Hypertension Father    • Lung cancer Father    • Diabetes Paternal Grandmother    • Malig Hyperthermia Neg Hx      Social History     Socioeconomic History   • Marital status:    Tobacco Use   • Smoking status: Never   • Smokeless tobacco: Never   Vaping Use   • Vaping Use: Never used   Substance and Sexual Activity   • Alcohol use: No   • Drug use: Never   • Sexual activity: Defer     No Known Allergies    Current Outpatient Medications:   •  aspirin 81 MG tablet, Take 1 tablet by mouth Daily., Disp: , Rfl:   •  BD PEN NEEDLE SAVANA U/F 32G X 4 MM misc, USE AS DIRECTED ONCE DAILY FOR INSULIN INJECTIONS, Disp: 100 each, Rfl: 0  •  cetirizine (zyrTEC) 5 MG tablet, Take 1 tablet by mouth As Needed., Disp: , Rfl:   •  Chlorcyclizine-Pseudoephed (Stahist AD) 25-60 MG tablet, Take 1 tablet by mouth 2 (Two) Times a Day As Needed (sinus congestion)., Disp: 60 tablet, Rfl: 5  •  chlorthalidone (HYGROTON) 25 MG tablet, Take 1 tablet by mouth daily., Disp: 90 tablet, Rfl: 0  •  ezetimibe (ZETIA) 10 MG tablet, Take 1 tablet by mouth Daily., Disp: 90 tablet, Rfl: 3  •  fluticasone (FLONASE) 50 MCG/ACT nasal spray, 1 spray into the  nostril(s) as directed by provider Daily., Disp: , Rfl:   •  glipizide (GLUCOTROL XL) 10 MG 24 hr tablet, Take 1 tablet by mouth Daily., Disp: 90 tablet, Rfl: 1  •  metFORMIN ER (GLUCOPHAGE-XR) 500 MG 24 hr tablet, Take 2 tablets by mouth Daily With Breakfast & Dinner for 90 days., Disp: 360 tablet, Rfl: 1  •  ONE TOUCH ULTRA TEST test strip, , Disp: , Rfl:   •  ONETOUCH DELICA LANCETS 33G misc, USE  PIECE TO CHECK GLUCOSE TWICE DAILY, Disp: 200 each, Rfl: 0  •  rosuvastatin (CRESTOR) 40 MG tablet, Take 1 tablet by mouth Daily., Disp: 90 tablet, Rfl: 3  •  tadalafil (CIALIS) 5 MG tablet, Take 1-4 tablets prior to sexual activity., Disp: 30 tablet, Rfl: 11  •  Tresiba FlexTouch 100 UNIT/ML solution pen-injector injection, Inject 51 Units under the skin into the appropriate area as directed Daily., Disp: 15 mL, Rfl: 5  •  triamcinolone (KENALOG) 0.1 % ointment, Apply 1 application topically to the appropriate area as directed 2 (Two) Times a Day., Disp: 80 g, Rfl: 1  •  valsartan (DIOVAN) 40 MG tablet, TAKE 1 TABLET BY MOUTH EVERY DAY, Disp: , Rfl:   •  empagliflozin (Jardiance) 25 MG tablet tablet, Take 1 tablet by mouth Daily for 90 days., Disp: 90 tablet, Rfl: 1  •  montelukast (Singulair) 10 MG tablet, Take 1 tablet by mouth Every Night for 14 days., Disp: 14 tablet, Rfl: 0    Review of Systems   Constitutional: Negative for activity change, appetite change, fatigue, unexpected weight gain and unexpected weight loss.   Eyes: Negative for blurred vision and visual disturbance.   Gastrointestinal: Negative for abdominal pain, constipation, diarrhea, nausea, vomiting, GERD and indigestion.   Endocrine: Negative for polydipsia, polyphagia and polyuria.   Neurological: Negative for numbness.        Objective     Vitals:    03/14/23 0816   BP: 131/69   BP Location: Right arm   Patient Position: Sitting   Cuff Size: Adult   Pulse: 83   Temp: 97.5 °F (36.4 °C)   SpO2: 97%   Weight: 92.1 kg (203 lb)   Height: 180.3 cm  "(71\")   PainSc: 0-No pain     Body mass index is 28.31 kg/m².    Physical Exam  Constitutional:       Appearance: Normal appearance.      Comments: Overweight with BMI 28.31   HENT:      Head: Normocephalic and atraumatic.      Right Ear: External ear normal.      Left Ear: External ear normal.      Nose: Nose normal.   Eyes:      Extraocular Movements: Extraocular movements intact.      Conjunctiva/sclera: Conjunctivae normal.   Pulmonary:      Effort: Pulmonary effort is normal.   Musculoskeletal:         General: Normal range of motion.      Cervical back: Normal range of motion.   Skin:     General: Skin is warm and dry.   Neurological:      General: No focal deficit present.      Mental Status: He is alert and oriented to person, place, and time. Mental status is at baseline.   Psychiatric:         Mood and Affect: Mood normal.         Behavior: Behavior normal.         Thought Content: Thought content normal.         Judgment: Judgment normal.         Result Review :   The following data was reviewed by: VESNA Salter on 03/14/2023:    Most Recent A1C    HGBA1C Most Recent 3/14/23   Hemoglobin A1C 7.6 (A)   (A) Abnormal value              A1C Last 3 Results    HGBA1C Last 3 Results 12/8/22 3/14/23   Hemoglobin A1C 7.6 7.6 (A)   (A) Abnormal value            Point-of-care A1c in the office today 7.6% indicating uncontrolled type 2 diabetes.  This is unchanged from the prior result collected in December 2022    Glucose   Date Value Ref Range Status   03/14/2023 204 (H) 70 - 99 mg/dL Final     Comment:     Serial Number: [id number]:  [id number]     Point of Care glucose in the office today is elevated at 204 mg/dl          Assessment: He has had no change in his A1c since last appointment.  He has been seen by the dietitian and implemented some dietary changes.      Diagnoses and all orders for this visit:    1. Uncontrolled type 2 diabetes mellitus with hyperglycemia (HCC) (Primary)  -     POC " Glycosylated Hemoglobin (Hb A1C)  -     glipizide (GLUCOTROL XL) 10 MG 24 hr tablet; Take 1 tablet by mouth Daily.  Dispense: 90 tablet; Refill: 1  -     metFORMIN ER (GLUCOPHAGE-XR) 500 MG 24 hr tablet; Take 2 tablets by mouth Daily With Breakfast & Dinner for 90 days.  Dispense: 360 tablet; Refill: 1  -     empagliflozin (Jardiance) 25 MG tablet tablet; Take 1 tablet by mouth Daily for 90 days.  Dispense: 90 tablet; Refill: 1    2. Type 2 diabetes mellitus without complication, with long-term current use of insulin ([facility])    3. Overweight (BMI 25.0-29.9)    Other orders  -     POC Glucose        Plan: We will increase his Jardiance to 25 mg once a day.  The patient will take 2 of his 10 mg tablets until he consumes what he has on hand and then will begin using the 25 mg tablets.  No other changes were made to his treatment plan at this time.  If he has any complications related to the increase in the Jardiance he will contact our office.    The patient will monitor his blood glucose levels 1-2 times a day.  If he develops problematic hyperglycemia or hypoglycemia or adverse drug reactions, he will contact the office for further instructions.        Follow Up     Return in about 3 months (around 6/14/2023) for Medication Management.    Patient was given instructions and counseling regarding his condition or for health maintenance advice. Please see specific information pulled into the AVS if appropriate.     Kelly Quintero, VESNA  03/14/2023      Dictated Utilizing Dragon Dictation.  Please note that portions of this note were completed with a voice recognition program.  Part of this note may be an electronic transcription/translation of spoken language to printed text using the Dragon Dictation System.

## 2023-03-14 ENCOUNTER — OFFICE VISIT (OUTPATIENT)
Dept: DIABETES SERVICES | Facility: HOSPITAL | Age: 63
End: 2023-03-14
Payer: COMMERCIAL

## 2023-03-14 VITALS
OXYGEN SATURATION: 97 % | SYSTOLIC BLOOD PRESSURE: 131 MMHG | HEIGHT: 71 IN | TEMPERATURE: 97.5 F | WEIGHT: 203 LBS | HEART RATE: 83 BPM | BODY MASS INDEX: 28.42 KG/M2 | DIASTOLIC BLOOD PRESSURE: 69 MMHG

## 2023-03-14 DIAGNOSIS — E66.3 OVERWEIGHT (BMI 25.0-29.9): ICD-10-CM

## 2023-03-14 DIAGNOSIS — E11.9 TYPE 2 DIABETES MELLITUS WITHOUT COMPLICATION, WITH LONG-TERM CURRENT USE OF INSULIN: ICD-10-CM

## 2023-03-14 DIAGNOSIS — Z79.4 TYPE 2 DIABETES MELLITUS WITHOUT COMPLICATION, WITH LONG-TERM CURRENT USE OF INSULIN: ICD-10-CM

## 2023-03-14 DIAGNOSIS — E11.65 UNCONTROLLED TYPE 2 DIABETES MELLITUS WITH HYPERGLYCEMIA: Primary | ICD-10-CM

## 2023-03-14 LAB
EXPIRATION DATE: ABNORMAL
GLUCOSE BLDC GLUCOMTR-MCNC: 204 MG/DL (ref 70–99)
HBA1C MFR BLD: 7.6 %
Lab: ABNORMAL

## 2023-03-14 PROCEDURE — G0463 HOSPITAL OUTPT CLINIC VISIT: HCPCS | Performed by: NURSE PRACTITIONER

## 2023-03-14 PROCEDURE — 82962 GLUCOSE BLOOD TEST: CPT | Performed by: NURSE PRACTITIONER

## 2023-03-14 PROCEDURE — 99214 OFFICE O/P EST MOD 30 MIN: CPT | Performed by: NURSE PRACTITIONER

## 2023-03-14 RX ORDER — METFORMIN HYDROCHLORIDE 500 MG/1
1000 TABLET, EXTENDED RELEASE ORAL
Qty: 360 TABLET | Refills: 1 | Status: SHIPPED | OUTPATIENT
Start: 2023-03-14 | End: 2023-06-12

## 2023-03-14 RX ORDER — GLIPIZIDE 10 MG/1
10 TABLET, FILM COATED, EXTENDED RELEASE ORAL DAILY
Qty: 90 TABLET | Refills: 1 | Status: SHIPPED | OUTPATIENT
Start: 2023-03-14

## 2023-03-14 NOTE — PATIENT INSTRUCTIONS
Increase the Jardiance to 25 mg once a day.  You may take 2 of your 10 mg tablets and to use what you have and then switch to 25 mg tablets

## 2023-03-23 ENCOUNTER — OFFICE VISIT (OUTPATIENT)
Dept: ORTHOPEDIC SURGERY | Facility: CLINIC | Age: 63
End: 2023-03-23
Payer: OTHER MISCELLANEOUS

## 2023-03-23 VITALS — BODY MASS INDEX: 28.42 KG/M2 | HEIGHT: 71 IN | WEIGHT: 203 LBS

## 2023-03-23 DIAGNOSIS — Z98.890 STATUS POST ARTHROSCOPY OF SHOULDER: Primary | ICD-10-CM

## 2023-03-23 PROCEDURE — 99212 OFFICE O/P EST SF 10 MIN: CPT | Performed by: ORTHOPAEDIC SURGERY

## 2023-03-23 NOTE — PROGRESS NOTES
Subjective:     Patient ID: Chemo Lin is a 62 y.o. male.    Chief Complaint:   Follow-up status post right shoulder massive rotator cuff repair, biceps tenodesis-2/18/2022  History of Present Illness  Chemo Lin returns to the clinic for evaluation of status post right shoulder rotator cuff repair. Overall, he is doing fairly well with his right shoulder. He still has had some limitations with endurance activities with forceful pushing, lifting, push, or pulling with that right side, but it has gotten significantly better, particularly over the last 4 to 6 weeks. He has had some very mild pain over the anterolateral aspect of his right shoulder. He denies any numbness or tingling. He denies any fevers, chills, or sweats. He has continued to work on strengthening, range of motion, and activities as tolerated. He rates his pain as a 3 to 4 out of 10. He states it is intermittent in nature.       Social History     Occupational History   • Not on file   Tobacco Use   • Smoking status: Never   • Smokeless tobacco: Never   Vaping Use   • Vaping Use: Never used   Substance and Sexual Activity   • Alcohol use: No   • Drug use: Never   • Sexual activity: Yes     Partners: Female     Birth control/protection: Surgical      Past Medical History:   Diagnosis Date   • Allergic rhinitis    • Arthritis of back 11-15-21    [facility]   • Coronary artery disease    • History of echocardiogram 04/15/2020    11/26/2019-Calculated EF = 63% Mild mitral valve regurgitation is present Mild tricuspid valve regurgitation is present.      • History of stress test 04/15/2020    11/26/2019-Diaphragmatic attenuation artifact is present. Left ventricular ejection fraction is normal (Calculated EF = 64%). Myocardial perfusion imaging indicates a normal myocardial perfusion study with no evidence of ischemia. Impressions are consistent with a low risk study. Findings consistent with an equivocal ECG stress test   •  "Hyperlipidemia    • Hypertension     type 2 diabetes mellitus, uncontrolled   • Rotator cuff syndrome 12-23-21    MRI from [facility]   • Tendinitis    • Type 2 diabetes mellitus      Past Surgical History:   Procedure Laterality Date   • CATARACT EXTRACTION, BILATERAL     • COLONOSCOPY     • CORONARY ANGIOPLASTY WITH STENT PLACEMENT      10 years ago   • FOOT SURGERY Right     great toe joint repair   • SHOULDER ARTHROSCOPY W/ ROTATOR CUFF REPAIR Right 02/18/2022    Procedure: SHOULDER ARTHROSCOPY WITH ROTATOR CUFF REPAIR COMPLEX, OPEN BICEPS TENODESIS;  Surgeon: Kar Newman MD;  Location: [facility];  Service: Orthopedics;  Laterality: Right;       Family History   Problem Relation Age of Onset   • Hypertension Mother    • Heart disease Father    • Hypertension Father    • Lung cancer Father    • Cancer Father    • Diabetes Father    • Diabetes Paternal Grandmother    • Diabetes Maternal Grandmother    • Malig Hyperthermia Neg Hx          Review of Systems        Objective:  Vitals:    03/23/23 1341   Weight: 92.1 kg (203 lb)   Height: 180.3 cm (71\")         03/23/23  1341   Weight: 92.1 kg (203 lb)     Body mass index is 28.31 kg/m².  General: No acute distress.  Resp: normal respiratory effort  Skin: no rashes or wounds; normal turgor  Psych: mood and affect appropriate; recent and remote memory intact          Ortho Exam       Right shoulder:    Prior incision is well healed.   Minimal tenderness lateral subacromial space.   Active Forward Flexion is to 175 degrees.   Flexion strength- 4/5.   Active external rotation is to 50 degrees.   Rotation strength- 4/5.   Internal Rotation- T12.   Belly press test strength- 4+/5.   Empty can test- Negative.   Drop arm test- Negative.   External rotation lag sign- Negative.   Neer's sign test- Minimally positive.   Hawkin's-Beto test- Minimally positive.   Brisk cap refill to all digits, palpable 2+ radial pulse right wrist.     Imaging:  Right Shoulder " X-Ray  Indication: Pain  AP, scapular Y, and axillary lateral views    Findings:  No fracture  No bony lesion  Normal soft tissues  Normal joint spaces  Mild to moderate residual humeral head elevation with no evidence of significant glenohumeral joint space narrowing    Compared to prior office x-rays sutures    Assessment:        1. Status post right shoulder arthroscopic rotator cuff repair with use of [employer] bio inductive implant, open biceps tenodesis DOS 02/18/2022           Plan:        1. Discussed treatment options at length with patient at today's visit.  2. The patient is doing well at this point in time with progress. He does have some humeral head elevation as well as some residual weakness of his rotator cuff, but overall he is making progress at this point in time and functionally seems to be improving and moving in a good direction. At this point, he wanted to hold off on any injections. He will continue with his exercise or work at home. If he gets an exacerbation of pain or further issues, then I am always happy to see him back as needed and may consider injections or imaging if warranted.        Chemo Lin was in agreement with plan and had all questions answered.     Orders:  Orders Placed This Encounter   Procedures   • XR Shoulder 2+ View Right       Medications:  No orders of the defined types were placed in this encounter.      Followup:  Return if symptoms worsen or fail to improve.    Diagnoses and all orders for this visit:    1. Status post right shoulder arthroscopic rotator cuff repair with use of [employer] bio inductive implant, open biceps tenodesis DOS 02/18/2022 (Primary)  -     XR Shoulder 2+ View Right          Transcribed from ambient dictation for Kar Newman MD by Joan Espinosa.  03/23/23   16:28 EDT

## 2023-04-17 ENCOUNTER — OFFICE VISIT (OUTPATIENT)
Dept: CARDIOLOGY | Facility: CLINIC | Age: 63
End: 2023-04-17
Payer: COMMERCIAL

## 2023-04-17 VITALS
WEIGHT: 201 LBS | HEIGHT: 71 IN | HEART RATE: 73 BPM | BODY MASS INDEX: 28.14 KG/M2 | DIASTOLIC BLOOD PRESSURE: 86 MMHG | SYSTOLIC BLOOD PRESSURE: 130 MMHG

## 2023-04-17 DIAGNOSIS — E78.5 DYSLIPIDEMIA: ICD-10-CM

## 2023-04-17 DIAGNOSIS — E11.9 CONTROLLED TYPE 2 DIABETES MELLITUS WITHOUT COMPLICATION, WITH LONG-TERM CURRENT USE OF INSULIN: ICD-10-CM

## 2023-04-17 DIAGNOSIS — Z79.4 CONTROLLED TYPE 2 DIABETES MELLITUS WITHOUT COMPLICATION, WITH LONG-TERM CURRENT USE OF INSULIN: ICD-10-CM

## 2023-04-17 DIAGNOSIS — I10 ESSENTIAL HYPERTENSION: ICD-10-CM

## 2023-04-17 DIAGNOSIS — I25.10 CORONARY ARTERY DISEASE INVOLVING NATIVE CORONARY ARTERY OF NATIVE HEART WITHOUT ANGINA PECTORIS: Primary | ICD-10-CM

## 2023-04-17 DIAGNOSIS — E78.00 PURE HYPERCHOLESTEROLEMIA: ICD-10-CM

## 2023-04-17 NOTE — PROGRESS NOTES
Chemo Lin  1960  Date of Office Visit: 04/17/23  Encounter Provider: Ganga Briseno MD  Place of Service: [facility] CARDIOLOGY      CHIEF COMPLAINT:  CAD with prior PCI to the   Hyperlipidemia  Essential hypertension  Diabetes mellitus    HISTORY OF PRESENT ILLNESS: Very pleasant 62-year-old male with a medical history of coronary artery disease, prior PCI to the  bare-metal stent 2005, hypertension, hyperlipidemia, diabetes mellitus, who presents to me as a transfer of care.  He states that he has been doing well as of late.  He denies any recent issues with chest pain or dyspnea on exertion.  His blood pressure and heart rate have been well controlled.  He denies any orthopnea, PND or edema.  He has had no recent changes in his medical regimen.  His LDL has been mildly elevated and dietary modification has been recommended.  He has no new complaints.    Review of Systems   Constitutional: Negative for fever and malaise/fatigue.   HENT: Negative for nosebleeds and sore throat.    Eyes: Negative for blurred vision and double vision.   Cardiovascular: Negative for chest pain, claudication, palpitations and syncope.   Respiratory: Negative for cough, shortness of breath and snoring.    Endocrine: Negative for cold intolerance, heat intolerance and polydipsia.   Skin: Negative for itching, poor wound healing and rash.   Musculoskeletal: Negative for joint pain, joint swelling, muscle weakness and myalgias.   Gastrointestinal: Negative for abdominal pain, melena, nausea and vomiting.   Neurological: Negative for light-headedness, loss of balance, seizures, vertigo and weakness.   Psychiatric/Behavioral: Negative for altered mental status and depression.          Past Medical History:   Diagnosis Date   • Allergic rhinitis    • Arthritis of back 11-15-21    [facility]   • Coronary artery disease    • History of echocardiogram 04/15/2020     11/26/2019-Calculated EF = 63% Mild mitral valve regurgitation is present Mild tricuspid valve regurgitation is present.      • History of stress test 04/15/2020    11/26/2019-Diaphragmatic attenuation artifact is present. Left ventricular ejection fraction is normal (Calculated EF = 64%). Myocardial perfusion imaging indicates a normal myocardial perfusion study with no evidence of ischemia. Impressions are consistent with a low risk study. Findings consistent with an equivocal ECG stress test   • Hyperlipidemia    • Hypertension     type 2 diabetes mellitus, uncontrolled   • Rotator cuff syndrome 12-23-21    MRI from [facility]   • Tendinitis    • Type 2 diabetes mellitus        The following portions of the patient's history were reviewed and updated as appropriate: Social history , Family history and Surgical history     Current Outpatient Medications on File Prior to Visit   Medication Sig Dispense Refill   • aspirin 81 MG tablet Take 1 tablet by mouth Daily.     • BD PEN NEEDLE SAVANA U/F 32G X 4 MM misc USE AS DIRECTED ONCE DAILY FOR INSULIN INJECTIONS 100 each 0   • cetirizine (zyrTEC) 5 MG tablet Take 1 tablet by mouth As Needed.     • Chlorcyclizine-Pseudoephed (Stahist AD) 25-60 MG tablet Take 1 tablet by mouth 2 (Two) Times a Day As Needed (sinus congestion). 60 tablet 5   • chlorthalidone (HYGROTON) 25 MG tablet Take 1 tablet by mouth daily. 90 tablet 0   • empagliflozin (Jardiance) 25 MG tablet tablet Take 1 tablet by mouth Daily for 90 days. 90 tablet 1   • ezetimibe (ZETIA) 10 MG tablet Take 1 tablet by mouth Daily. 90 tablet 3   • fluticasone (FLONASE) 50 MCG/ACT nasal spray 1 spray into the nostril(s) as directed by provider Daily.     • glipizide (GLUCOTROL XL) 10 MG 24 hr tablet Take 1 tablet by mouth Daily. 90 tablet 1   • metFORMIN ER (GLUCOPHAGE-XR) 500 MG 24 hr tablet Take 2 tablets by mouth Daily With Breakfast & Dinner for 90 days. 360 tablet 1   • ONE TOUCH ULTRA TEST test strip      • ONETOUCH  "DELICA LANCETS 33G misc USE  PIECE TO CHECK GLUCOSE TWICE DAILY 200 each 0   • rosuvastatin (CRESTOR) 40 MG tablet Take 1 tablet by mouth Daily. 90 tablet 3   • tadalafil (CIALIS) 5 MG tablet Take 1-4 tablets prior to sexual activity. 30 tablet 11   • Tresiba FlexTouch 100 UNIT/ML solution pen-injector injection Inject 51 Units under the skin into the appropriate area as directed Daily. 15 mL 5   • triamcinolone (KENALOG) 0.1 % ointment Apply 1 application topically to the appropriate area as directed 2 (Two) Times a Day. 80 g 1   • valsartan (DIOVAN) 40 MG tablet TAKE 1 TABLET BY MOUTH EVERY DAY     • montelukast (Singulair) 10 MG tablet Take 1 tablet by mouth Every Night for 14 days. 14 tablet 0     No current facility-administered medications on file prior to visit.       No Known Allergies    Vitals:    04/17/23 1542   BP: 130/86   Pulse: 73   Weight: 91.2 kg (201 lb)   Height: 180.3 cm (71\")     Body mass index is 28.03 kg/m².   Constitutional:       Appearance: Well-developed.   Eyes:      General: No scleral icterus.     Conjunctiva/sclera: Conjunctivae normal.   HENT:      Head: Normocephalic and atraumatic.   Neck:      Thyroid: No thyromegaly.      Vascular: Normal carotid pulses. No carotid bruit, hepatojugular reflux or JVD.      Trachea: No tracheal deviation.   Pulmonary:      Effort: No respiratory distress.      Breath sounds: Normal breath sounds. No decreased breath sounds. No wheezing. No rhonchi. No rales.   Chest:      Chest wall: Not tender to palpatation.   Cardiovascular:      Normal rate. Regular rhythm.      No gallop.   Pulses:     Carotid: 2+ bilaterally.     Radial: 2+ bilaterally.     Femoral: 2+ bilaterally.     Dorsalis pedis: 2+ bilaterally.     Posterior tibial: 2+ bilaterally.  Edema:     Peripheral edema absent.   Abdominal:      General: Bowel sounds are normal. There is no distension.      Palpations: Abdomen is soft.      Tenderness: There is no abdominal tenderness. "   Musculoskeletal:         General: No deformity.      Cervical back: Normal range of motion and neck supple. Skin:     Findings: No erythema or rash.   Neurological:      Mental Status: Alert and oriented to person, place, and time.      Sensory: No sensory deficit.   Psychiatric:         Behavior: Behavior normal.            Lab Results   Component Value Date    WBC 5.6 12/17/2022    HGB 15.1 12/17/2022    HCT 45.4 12/17/2022    MCV 89 12/17/2022     12/17/2022       Lab Results   Component Value Date    GLUCOSE 119 (H) 12/17/2022    BUN 15 12/17/2022    CREATININE 0.99 12/17/2022    EGFRRESULT 86 12/17/2022    BCR 15 12/17/2022    K 3.9 12/17/2022    CO2 26 12/17/2022    CALCIUM 9.1 12/17/2022    PROTENTOTREF 6.5 12/17/2022    ALBUMIN 4.3 12/17/2022    BILITOT 0.4 12/17/2022    AST 25 12/17/2022    ALT 41 12/17/2022       Lab Results   Component Value Date    GLUCOSE 119 (H) 12/17/2022    CALCIUM 9.1 12/17/2022     12/17/2022    K 3.9 12/17/2022    CO2 26 12/17/2022     12/17/2022    BUN 15 12/17/2022    CREATININE 0.99 12/17/2022    EGFRRESULT 86 12/17/2022    BCR 15 12/17/2022    ANIONGAP 13.5 02/16/2022       Lab Results   Component Value Date    CHLPL 164 12/30/2019    TRIG 117 12/30/2019    HDL 33 (L) 12/30/2019     (H) 12/30/2019         ECG 12 Lead    Date/Time: 4/17/2023 4:02 PM  Performed by: Ganga Briseno MD  Authorized by: Ganga Briseno MD   Comparison: compared with previous ECG from 11/15/2021  Similar to previous ECG  Rhythm: sinus rhythm  Rate: normal  QRS axis: normal               5/27/22  • Arrhythmias were not significant during stress.  • Exercised for 8 minutes and 3 seconds on a Los protocol. Achieved 100% of maximum predicted heart rate.  • Estimated workload was 10.2 METS  • Normal hemodynamic response  • Upsloping ST depression noted in the inferolateral leads of 1 mm.  • No chest discomfort  • Equivocal study due to the upsloping ST  depression..       Results for orders placed during the hospital encounter of 11/26/19    Adult Transthoracic Echo Complete W/ Cont if Necessary Per Protocol    Interpretation Summary  · Calculated EF = 63%  · Mild mitral valve regurgitation is present  · Mild tricuspid valve regurgitation is present.    DISCUSSION/SUMMARY  Very pleasant 62-year-old with a medical history of coronary artery disease status post PCI to the  in 2005 with bare-metal stenting, hypertension, hyperlipidemia, diabetes mellitus, who presents to me as a transfer of care.  He is doing well and denies any new symptoms of chest pain or dyspnea on exertion.  His blood pressure and heart rhythm are well controlled.  His lipid panel is not quite at goal and dietary modification has previously been recommended.  He is on a good dose of Crestor and Zetia therapy.    1.  Coronary artery disease with prior PCI to the : Asymptomatic.  - Continue aspirin 81 mg p.o. daily  - Continue Crestor and Zetia therapy.  Tolerating these well.  No changes indicated.  - Continue valsartan therapy at 40 mg p.o. nightly.  Blood pressure reasonably well controlled  - Not on beta-blockade currently.  Issues in the past with resting bradycardia    2.  Hyperlipidemia: Continue Crestor and Zetia therapy.  Tolerating well.  Hold off on Repatha.    3.  Essential hypertension: Blood pressure today 130/86. Acceptable

## 2023-04-29 ENCOUNTER — APPOINTMENT (OUTPATIENT)
Dept: ULTRASOUND IMAGING | Facility: HOSPITAL | Age: 63
End: 2023-04-29
Payer: COMMERCIAL

## 2023-04-29 ENCOUNTER — HOSPITAL ENCOUNTER (EMERGENCY)
Facility: HOSPITAL | Age: 63
Discharge: HOME | End: 2023-04-29
Attending: EMERGENCY MEDICINE
Payer: COMMERCIAL

## 2023-04-29 ENCOUNTER — APPOINTMENT (OUTPATIENT)
Dept: CT IMAGING | Facility: HOSPITAL | Age: 63
End: 2023-04-29
Payer: COMMERCIAL

## 2023-04-29 VITALS
HEART RATE: 64 BPM | HEIGHT: 71 IN | SYSTOLIC BLOOD PRESSURE: 127 MMHG | DIASTOLIC BLOOD PRESSURE: 74 MMHG | TEMPERATURE: 96.8 F | WEIGHT: 201 LBS | OXYGEN SATURATION: 96 % | RESPIRATION RATE: 17 BRPM | BODY MASS INDEX: 28.14 KG/M2

## 2023-04-29 DIAGNOSIS — R07.89 ATYPICAL CHEST PAIN: ICD-10-CM

## 2023-04-29 DIAGNOSIS — K80.50 BILIARY COLIC: Primary | ICD-10-CM

## 2023-04-29 LAB
ALBUMIN SERPL-MCNC: 4.5 G/DL (ref 3.5–5.2)
ALBUMIN/GLOB SERPL: 2 G/DL
ALP SERPL-CCNC: 74 U/L (ref 39–117)
ALT SERPL W P-5'-P-CCNC: 40 U/L (ref 1–41)
ANION GAP SERPL CALCULATED.3IONS-SCNC: 9 MMOL/L (ref 5–15)
AST SERPL-CCNC: 25 U/L (ref 1–40)
BASOPHILS # BLD AUTO: 0.04 10*3/MM3 (ref 0–0.2)
BASOPHILS NFR BLD AUTO: 0.6 % (ref 0–1.5)
BILIRUB SERPL-MCNC: 0.4 MG/DL (ref 0–1.2)
BILIRUB UR QL STRIP: NEGATIVE
BUN SERPL-MCNC: 25 MG/DL (ref 8–23)
BUN/CREAT SERPL: 19.2 (ref 7–25)
CALCIUM SPEC-SCNC: 9.6 MG/DL (ref 8.6–10.5)
CHLORIDE SERPL-SCNC: 101 MMOL/L (ref 98–107)
CLARITY UR: CLEAR
CO2 SERPL-SCNC: 30 MMOL/L (ref 22–29)
COLOR UR: YELLOW
CREAT SERPL-MCNC: 1.3 MG/DL (ref 0.76–1.27)
DEPRECATED RDW RBC AUTO: 41.6 FL (ref 37–54)
EGFRCR SERPLBLD CKD-EPI 2021: 62.1 ML/MIN/1.73
EOSINOPHIL # BLD AUTO: 0.4 10*3/MM3 (ref 0–0.4)
EOSINOPHIL NFR BLD AUTO: 6 % (ref 0.3–6.2)
ERYTHROCYTE [DISTWIDTH] IN BLOOD BY AUTOMATED COUNT: 12.5 % (ref 12.3–15.4)
GEN 5 2HR TROPONIN T REFLEX: 11 NG/L
GLOBULIN UR ELPH-MCNC: 2.3 GM/DL
GLUCOSE SERPL-MCNC: 296 MG/DL (ref 65–99)
GLUCOSE UR STRIP-MCNC: ABNORMAL MG/DL
HCT VFR BLD AUTO: 47.6 % (ref 37.5–51)
HGB BLD-MCNC: 15.3 G/DL (ref 13–17.7)
HGB UR QL STRIP.AUTO: NEGATIVE
IMM GRANULOCYTES # BLD AUTO: 0.02 10*3/MM3 (ref 0–0.05)
IMM GRANULOCYTES NFR BLD AUTO: 0.3 % (ref 0–0.5)
KETONES UR QL STRIP: NEGATIVE
LEUKOCYTE ESTERASE UR QL STRIP.AUTO: NEGATIVE
LIPASE SERPL-CCNC: 33 U/L (ref 13–60)
LYMPHOCYTES # BLD AUTO: 1.66 10*3/MM3 (ref 0.7–3.1)
LYMPHOCYTES NFR BLD AUTO: 25 % (ref 19.6–45.3)
MAGNESIUM SERPL-MCNC: 2.4 MG/DL (ref 1.6–2.4)
MCH RBC QN AUTO: 29.3 PG (ref 26.6–33)
MCHC RBC AUTO-ENTMCNC: 32.1 G/DL (ref 31.5–35.7)
MCV RBC AUTO: 91 FL (ref 79–97)
MONOCYTES # BLD AUTO: 0.69 10*3/MM3 (ref 0.1–0.9)
MONOCYTES NFR BLD AUTO: 10.4 % (ref 5–12)
NEUTROPHILS NFR BLD AUTO: 3.84 10*3/MM3 (ref 1.7–7)
NEUTROPHILS NFR BLD AUTO: 57.7 % (ref 42.7–76)
NITRITE UR QL STRIP: NEGATIVE
NRBC BLD AUTO-RTO: 0 /100 WBC (ref 0–0.2)
PH UR STRIP.AUTO: 5.5 [PH] (ref 5–8)
PLATELET # BLD AUTO: 192 10*3/MM3 (ref 140–450)
PMV BLD AUTO: 10.5 FL (ref 6–12)
POTASSIUM SERPL-SCNC: 3.3 MMOL/L (ref 3.5–5.2)
PROT SERPL-MCNC: 6.8 G/DL (ref 6–8.5)
PROT UR QL STRIP: NEGATIVE
QT INTERVAL: 390 MS
QT INTERVAL: 403 MS
RBC # BLD AUTO: 5.23 10*6/MM3 (ref 4.14–5.8)
SODIUM SERPL-SCNC: 140 MMOL/L (ref 136–145)
SP GR UR STRIP: >=1.03 (ref 1–1.03)
TROPONIN T DELTA: 0 NG/L
TROPONIN T SERPL HS-MCNC: 11 NG/L
UROBILINOGEN UR QL STRIP: ABNORMAL
WBC NRBC COR # BLD: 6.65 10*3/MM3 (ref 3.4–10.8)

## 2023-04-29 PROCEDURE — 84484 ASSAY OF TROPONIN QUANT: CPT | Performed by: EMERGENCY MEDICINE

## 2023-04-29 PROCEDURE — 93005 ELECTROCARDIOGRAM TRACING: CPT

## 2023-04-29 PROCEDURE — 96375 TX/PRO/DX INJ NEW DRUG ADDON: CPT

## 2023-04-29 PROCEDURE — 25510000001 IOPAMIDOL PER 1 ML: Performed by: EMERGENCY MEDICINE

## 2023-04-29 PROCEDURE — 96374 THER/PROPH/DIAG INJ IV PUSH: CPT

## 2023-04-29 PROCEDURE — 83735 ASSAY OF MAGNESIUM: CPT | Performed by: EMERGENCY MEDICINE

## 2023-04-29 PROCEDURE — 76705 ECHO EXAM OF ABDOMEN: CPT

## 2023-04-29 PROCEDURE — 93005 ELECTROCARDIOGRAM TRACING: CPT | Performed by: EMERGENCY MEDICINE

## 2023-04-29 PROCEDURE — 83690 ASSAY OF LIPASE: CPT | Performed by: EMERGENCY MEDICINE

## 2023-04-29 PROCEDURE — 74177 CT ABD & PELVIS W/CONTRAST: CPT

## 2023-04-29 PROCEDURE — 99284 EMERGENCY DEPT VISIT MOD MDM: CPT

## 2023-04-29 PROCEDURE — 71275 CT ANGIOGRAPHY CHEST: CPT

## 2023-04-29 PROCEDURE — 80053 COMPREHEN METABOLIC PANEL: CPT | Performed by: EMERGENCY MEDICINE

## 2023-04-29 PROCEDURE — 85025 COMPLETE CBC W/AUTO DIFF WBC: CPT | Performed by: EMERGENCY MEDICINE

## 2023-04-29 PROCEDURE — 25010000002 MORPHINE PER 10 MG: Performed by: EMERGENCY MEDICINE

## 2023-04-29 PROCEDURE — 36415 COLL VENOUS BLD VENIPUNCTURE: CPT

## 2023-04-29 PROCEDURE — 81003 URINALYSIS AUTO W/O SCOPE: CPT | Performed by: EMERGENCY MEDICINE

## 2023-04-29 PROCEDURE — 25010000002 ONDANSETRON PER 1 MG: Performed by: EMERGENCY MEDICINE

## 2023-04-29 PROCEDURE — 93010 ELECTROCARDIOGRAM REPORT: CPT | Performed by: INTERNAL MEDICINE

## 2023-04-29 RX ORDER — MORPHINE SULFATE 2 MG/ML
4 INJECTION, SOLUTION INTRAMUSCULAR; INTRAVENOUS ONCE
Status: COMPLETED | OUTPATIENT
Start: 2023-04-29 | End: 2023-04-29

## 2023-04-29 RX ORDER — SODIUM CHLORIDE 0.9 % (FLUSH) 0.9 %
10 SYRINGE (ML) INJECTION AS NEEDED
Status: DISCONTINUED | OUTPATIENT
Start: 2023-04-29 | End: 2023-04-29 | Stop reason: HOSPADM

## 2023-04-29 RX ORDER — HYDROCODONE BITARTRATE AND ACETAMINOPHEN 5; 325 MG/1; MG/1
1 TABLET ORAL EVERY 6 HOURS PRN
Qty: 4 TABLET | Refills: 0 | Status: SHIPPED | OUTPATIENT
Start: 2023-04-29

## 2023-04-29 RX ORDER — ONDANSETRON 2 MG/ML
4 INJECTION INTRAMUSCULAR; INTRAVENOUS ONCE
Status: COMPLETED | OUTPATIENT
Start: 2023-04-29 | End: 2023-04-29

## 2023-04-29 RX ORDER — NITROGLYCERIN 0.4 MG/1
0.4 TABLET SUBLINGUAL
Status: DISCONTINUED | OUTPATIENT
Start: 2023-04-29 | End: 2023-04-29 | Stop reason: HOSPADM

## 2023-04-29 RX ADMIN — ONDANSETRON 4 MG: 2 INJECTION INTRAMUSCULAR; INTRAVENOUS at 02:03

## 2023-04-29 RX ADMIN — NITROGLYCERIN 0.4 MG: 0.4 TABLET SUBLINGUAL at 02:32

## 2023-04-29 RX ADMIN — MORPHINE SULFATE 4 MG: 2 INJECTION, SOLUTION INTRAMUSCULAR; INTRAVENOUS at 02:03

## 2023-04-29 RX ADMIN — NITROGLYCERIN 0.4 MG: 0.4 TABLET SUBLINGUAL at 02:24

## 2023-04-29 RX ADMIN — IOPAMIDOL 85 ML: 755 INJECTION, SOLUTION INTRAVENOUS at 03:18

## 2023-04-29 NOTE — ED PROVIDER NOTES
EMERGENCY DEPARTMENT ENCOUNTER    Room Number:  16/16  Date of encounter:  4/29/2023  PCP: Roly Barreto MD  Patient Care Team:  Roly Barreto MD as PCP - General  Annalee Benson APRN (Family Medicine)  Cedric Gurrola MD as Consulting Physician (Cardiology)  Kar Newman MD as Surgeon (Orthopedic Surgery)  Kelly Quintero APRN as Nurse Practitioner (Endocrinology)  Yoly Hall APRN as Nurse Practitioner (Urology)   Independent Historians: Patient    HPI:  Chief Complaint: Abdominal pain    A complete HPI/ROS/PMH/PSH/SH/FH are unobtainable due to: None    Chronic or social conditions impacting patient care (Social Determinants of Health): None  (Financial Resource Strain / Food Insecurity / Transportation Needs / Physical Activity / Stress / Social Connections / Intimate Partner Violence / Housing Stability)    Context: Chemo Lin is a 62 y.o. male who presents to the ED c/o acute right upper abdominal pain that radiates into the lower chest also radiates to his back.  Patient states that he ate some Mexican food this evening then awoke with fairly significant pain.  Denies any shortness of breath.  Does report some nausea but no vomiting.  Denies diaphoresis.  Patient does have extensive cardiac history with history of CAD and stenting.    Review of prior external notes (non-ED) -and- Review of prior external test results outside of this encounter: I have reviewed patient's cardiology note from 4/17/2023    Prescription drug monitoring program review: RADHA reviewed by Maciel Lin MD       PAST MEDICAL HISTORY  Active Ambulatory Problems     Diagnosis Date Noted   • Hyperuricemia 02/15/2016   • Low testosterone 02/15/2016   • Essential hypertension 02/15/2016   • Vitamin D deficiency 02/15/2016   • Dyslipidemia 02/15/2016   • Diabetes mellitus type 2, controlled, without complications 02/15/2016   • ED (erectile dysfunction) 02/22/2019   • CAD (coronary  artery disease) 05/05/2013   • History of coronary artery stent placement 10/17/2019   • Respiratory insufficiency 10/17/2019   • Family history of premature coronary heart disease 10/17/2019   • Hand arthritis 02/27/2020   • History of stress test 04/15/2020   • History of echocardiogram 04/15/2020   • Complete tear of right rotator cuff 02/15/2022   • Subacromial impingement of right shoulder 02/15/2022   • Biceps tendinitis of right upper extremity 02/15/2022   • Status post right shoulder arthroscopic rotator cuff repair with use of [employer] bio inductive implant, open biceps tenodesis DOS 02/18/2022 02/25/2022     Resolved Ambulatory Problems     Diagnosis Date Noted   • No Resolved Ambulatory Problems     Past Medical History:   Diagnosis Date   • Allergic rhinitis    • Arthritis of back 11-15-21   • Coronary artery disease    • Hyperlipidemia    • Hypertension    • Rotator cuff syndrome 12-23-21   • Tendinitis    • Type 2 diabetes mellitus          PAST SURGICAL HISTORY  Past Surgical History:   Procedure Laterality Date   • CATARACT EXTRACTION, BILATERAL     • COLONOSCOPY     • CORONARY ANGIOPLASTY WITH STENT PLACEMENT      10 years ago   • FOOT SURGERY Right     great toe joint repair   • SHOULDER ARTHROSCOPY W/ ROTATOR CUFF REPAIR Right 02/18/2022    Procedure: SHOULDER ARTHROSCOPY WITH ROTATOR CUFF REPAIR COMPLEX, OPEN BICEPS TENODESIS;  Surgeon: Kar Newman MD;  Location: [facility];  Service: Orthopedics;  Laterality: Right;         FAMILY HISTORY  Family History   Problem Relation Age of Onset   • Hypertension Mother    • Heart disease Father    • Hypertension Father    • Lung cancer Father    • Cancer Father    • Diabetes Father    • Diabetes Paternal Grandmother    • Diabetes Maternal Grandmother    • Malig Hyperthermia Neg Hx          SOCIAL HISTORY  Social History     Socioeconomic History   • Marital status:    Tobacco Use   • Smoking status: Never   • Smokeless tobacco: Never    Vaping Use   • Vaping Use: Never used   Substance and Sexual Activity   • Alcohol use: No   • Drug use: Never   • Sexual activity: Yes     Partners: Female     Birth control/protection: Surgical         ALLERGIES  Patient has no known allergies.        REVIEW OF SYSTEMS  Review of Systems  Included in HPI  All systems reviewed and negative except for those discussed in HPI.      PHYSICAL EXAM    I have reviewed the triage vital signs and nursing notes.    ED Triage Vitals   Temp Heart Rate Resp BP SpO2   04/29/23 0141 04/29/23 0141 04/29/23 0142 04/29/23 0141 04/29/23 0141   96.8 °F (36 °C) 80 16 166/93 96 %      Temp src Heart Rate Source Patient Position BP Location FiO2 (%)   04/29/23 0141 04/29/23 0141 04/29/23 0141 04/29/23 0141 --   Tympanic Monitor Standing Right arm        Physical Exam  GENERAL: alert, moderate acute distress  SKIN: Warm, dry  HENT: Normocephalic, atraumatic  EYES: no scleral icterus  CV: regular rhythm, regular rate  RESPIRATORY: normal effort, lungs clear  ABDOMEN: soft, right upper quadrant tenderness, nondistended  MUSCULOSKELETAL: no deformity  NEURO: alert, moves all extremities, follows commands                                                               LAB RESULTS  Recent Results (from the past 24 hour(s))   ECG 12 Lead Other; epigastric pain    Collection Time: 04/29/23  1:46 AM   Result Value Ref Range    QT Interval 390 ms   Comprehensive Metabolic Panel    Collection Time: 04/29/23  2:00 AM    Specimen: Blood   Result Value Ref Range    Glucose 296 (H) 65 - 99 mg/dL    BUN 25 (H) 8 - 23 mg/dL    Creatinine 1.30 (H) 0.76 - 1.27 mg/dL    Sodium 140 136 - 145 mmol/L    Potassium 3.3 (L) 3.5 - 5.2 mmol/L    Chloride 101 98 - 107 mmol/L    CO2 30.0 (H) 22.0 - 29.0 mmol/L    Calcium 9.6 8.6 - 10.5 mg/dL    Total Protein 6.8 6.0 - 8.5 g/dL    Albumin 4.5 3.5 - 5.2 g/dL    ALT (SGPT) 40 1 - 41 U/L    AST (SGOT) 25 1 - 40 U/L    Alkaline Phosphatase 74 39 - 117 U/L    Total  Bilirubin 0.4 0.0 - 1.2 mg/dL    Globulin 2.3 gm/dL    A/G Ratio 2.0 g/dL    BUN/Creatinine Ratio 19.2 7.0 - 25.0    Anion Gap 9.0 5.0 - 15.0 mmol/L    eGFR 62.1 >60.0 mL/min/1.73   Lipase    Collection Time: 04/29/23  2:00 AM    Specimen: Blood   Result Value Ref Range    Lipase 33 13 - 60 U/L   High Sensitivity Troponin T    Collection Time: 04/29/23  2:00 AM    Specimen: Blood   Result Value Ref Range    HS Troponin T 11 <15 ng/L   Magnesium    Collection Time: 04/29/23  2:00 AM    Specimen: Blood   Result Value Ref Range    Magnesium 2.4 1.6 - 2.4 mg/dL   CBC Auto Differential    Collection Time: 04/29/23  2:00 AM    Specimen: Blood   Result Value Ref Range    WBC 6.65 3.40 - 10.80 10*3/mm3    RBC 5.23 4.14 - 5.80 10*6/mm3    Hemoglobin 15.3 13.0 - 17.7 g/dL    Hematocrit 47.6 37.5 - 51.0 %    MCV 91.0 79.0 - 97.0 fL    MCH 29.3 26.6 - 33.0 pg    MCHC 32.1 31.5 - 35.7 g/dL    RDW 12.5 12.3 - 15.4 %    RDW-SD 41.6 37.0 - 54.0 fl    MPV 10.5 6.0 - 12.0 fL    Platelets 192 140 - 450 10*3/mm3    Neutrophil % 57.7 42.7 - 76.0 %    Lymphocyte % 25.0 19.6 - 45.3 %    Monocyte % 10.4 5.0 - 12.0 %    Eosinophil % 6.0 0.3 - 6.2 %    Basophil % 0.6 0.0 - 1.5 %    Immature Grans % 0.3 0.0 - 0.5 %    Neutrophils, Absolute 3.84 1.70 - 7.00 10*3/mm3    Lymphocytes, Absolute 1.66 0.70 - 3.10 10*3/mm3    Monocytes, Absolute 0.69 0.10 - 0.90 10*3/mm3    Eosinophils, Absolute 0.40 0.00 - 0.40 10*3/mm3    Basophils, Absolute 0.04 0.00 - 0.20 10*3/mm3    Immature Grans, Absolute 0.02 0.00 - 0.05 10*3/mm3    nRBC 0.0 0.0 - 0.2 /100 WBC   ECG 12 Lead Chest Pain    Collection Time: 04/29/23  2:12 AM   Result Value Ref Range    QT Interval 403 ms   High Sensitivity Troponin T 2Hr    Collection Time: 04/29/23  3:52 AM    Specimen: Blood   Result Value Ref Range    HS Troponin T 11 <15 ng/L    Troponin T Delta 0 >=-4 - <+4 ng/L   Urinalysis With Microscopic If Indicated (No Culture) - Urine, Clean Catch    Collection Time: 04/29/23  4:52  AM    Specimen: Urine, Clean Catch   Result Value Ref Range    Color, UA Yellow Yellow, Straw    Appearance, UA Clear Clear    pH, UA 5.5 5.0 - 8.0    Specific Gravity, UA >=1.030 1.005 - 1.030    Glucose, UA >=1000 mg/dL (3+) (A) Negative    Ketones, UA Negative Negative    Bilirubin, UA Negative Negative    Blood, UA Negative Negative    Protein, UA Negative Negative    Leuk Esterase, UA Negative Negative    Nitrite, UA Negative Negative    Urobilinogen, UA 1.0 E.U./dL 0.2 - 1.0 E.U./dL       Ordered the above labs and independently reviewed the results.        RADIOLOGY  US Gallbladder    Result Date: 4/29/2023  GALLBLADDER ULTRASOUND  HISTORY: Cystic duct stone  COMPARISON: 04/29/2023  TECHNIQUE: Grayscale and color Doppler sonographic images were obtained through the right upper quadrant.  FINDINGS: There is limited visualization of the pancreas. It appeared normal on the earlier study. No focal hepatic lesions are seen. There is no intra or extrahepatic biliary dilatation. Common bile duct measures 4 mm. Gallbladder does appear mildly distended, although there is no gallbladder wall thickening or pericholecystic fluid. No stones are identified within the gallbladder. Gallbladder wall measures 2 mm in thickness. Right kidney measures 11.3 x 5.1 x 5.3 cm. There is no hydronephrosis.      Gallbladder appears mildly distended, although there is no gallbladder wall thickening or pericholecystic fluid. No stones are noted within the gallbladder.  This report was finalized on 4/29/2023 4:40 AM by Dr. Ceci Desir M.D.      CT Angiogram Chest, CT Abdomen Pelvis With Contrast    Result Date: 4/29/2023  CT ANGIOGRAM OF THE CHEST; CT OF THE ABDOMEN AND PELVIS WITH CONTRAST  HISTORY: Pleuritic chest pain and upper abdominal pain  COMPARISON: None available.  TECHNIQUE: Axial CT imaging was obtained through the thorax. IV contrast was administered. Three-D reformatted images were obtained.  FINDINGS: CT CHEST: No  acute pulmonary thromboembolus is seen. There is dilatation of the aortic root, measuring up to 4 cm. There is an aberrant right subclavian artery. The remainder of the thoracic aorta is normal in caliber. No obvious dissection is seen. There are coronary artery calcifications. The thyroid gland, trachea, and esophagus appear unremarkable. Mediastinal lymph nodes do not appear pathologically enlarged. There is dependent atelectasis. No acute osseous abnormalities are seen.  CT OF THE ABDOMEN AND PELVIS: No suspicious hepatic lesions are seen. The stomach, duodenum, adrenal glands, and pancreas all appear normal. Patient does appear to have a stone within the cystic duct, although I don't see any additional stones within the gallbladder. However, ultrasound would be more sensitive for assessment. Patient does have splenomegaly, with the spleen measuring up to 13.9 cm in AP dimensions. The kidneys enhance symmetrically. There is no hydronephrosis. No distal ureteral or bladder stones are seen. Prostate gland is within normal. There is no bowel obstruction. The appendix is normal. There is atherosclerotic involvement of the aorta. No acute osseous abnormalities are seen.       1. Mild dependent atelectasis. 2. Patient does appear to have a stone within the cystic duct. I don't see any definite stones within the gallbladder itself, although ultrasound would be more sensitive for assessment. I don't see any definite gallbladder wall thickening or pericholecystic fluid. 3. Splenomegaly.  Radiation dose reduction techniques were utilized, including automated exposure control and exposure modulation based on body size.  This report was finalized on 4/29/2023 3:49 AM by Dr. Ceci Desir M.D.        I ordered the above noted radiological studies. Reviewed by me and discussed with radiologist.  See dictation for official radiology interpretation.      PROCEDURES    Procedures      MEDICATIONS GIVEN IN ER    Medications    sodium chloride 0.9 % flush 10 mL (has no administration in time range)   nitroglycerin (NITROSTAT) SL tablet 0.4 mg (0.4 mg Sublingual Given 4/29/23 0232)   morphine injection 4 mg (4 mg Intravenous Given 4/29/23 0203)   ondansetron (ZOFRAN) injection 4 mg (4 mg Intravenous Given 4/29/23 0203)   iopamidol (ISOVUE-370) 76 % injection 100 mL (85 mL Intravenous Given by Other 4/29/23 0318)         ORDERS PLACED DURING THIS VISIT:  Orders Placed This Encounter   Procedures   • CT Angiogram Chest   • CT Abdomen Pelvis With Contrast   • US Gallbladder   • Comprehensive Metabolic Panel   • Lipase   • Urinalysis With Microscopic If Indicated (No Culture) - Urine, Clean Catch   • High Sensitivity Troponin T   • Magnesium   • CBC Auto Differential   • High Sensitivity Troponin T 2Hr   • NPO Diet NPO Type: Strict NPO   • Monitor Blood Pressure   • Cardiac Monitoring   • Pulse Oximetry, Continuous   • ECG 12 Lead Other; epigastric pain   • ECG 12 Lead Chest Pain   • Insert Peripheral IV   • CBC & Differential         PROGRESS, DATA ANALYSIS, CONSULTS, AND MEDICAL DECISION MAKING    All labs have been independently interpreted by me.  All radiology studies have been reviewed by me and discussed with radiologist dictating the report.   EKG's independently viewed and interpreted by me.  Discussion below represents my analysis of pertinent findings related to patient's condition, differential diagnosis, treatment plan and final disposition.    My differential diagnosis for abdominal pain includes but is not limited to:  Gastritis, gastroenteritis, peptic ulcer disease, GERD, esophageal perforation, acute appendicitis, mesenteric adenitis, Meckel’s diverticulum, epiploic appendagitis, diverticulitis, colon cancer, ulcerative colitis, Crohn’s disease, intussusception, small bowel obstruction, adhesions, ischemic bowel, perforated viscus, ileus, obstipation, biliary colic, cholecystitis, cholelithiasis, hepatitis, pancreatitis,  common bile duct obstruction, cholangitis, bile leak, splenic trauma, splenic rupture, splenic infarction, splenic abscess, abdominal abscess, ascites, spontaneous bacterial peritonitis, hernia, UTI, cystitis, prostatitis, ureterolithiasis, urinary obstruction, testicular torsion, AAA, myocardial infarction, pneumonia, cancer, porphyria, DKA, medications, sickle cell, viral syndrome, zoster        ED Course as of 04/29/23 0715   Sat Apr 29, 2023   0242 HS Troponin T: 11 [TJ]   0242 Potassium(!): 3.3 [TJ]   0242 Creatinine(!): 1.30 [TJ]   0242 Glucose(!): 296 [TJ]   0242 Magnesium: 2.4 [TJ]   0242 Lipase: 33 [TJ]   0242 WBC: 6.65 [TJ]   0242 Hemoglobin: 15.3 [TJ]   0242 Platelets: 192 [TJ]   0257 EKG          EKG time: 0212  Rhythm/Rate: Sinus rhythm rate 71  P waves and GA: Normal  QRS, axis: Narrow regular   ST and T waves: Nonspecific    Interpreted Contemporaneously by me, independently viewed [TJ]   0402 Reevaluation patient is much more comfortable.  Updated patient's wife regarding imaging and lab findings so far.  Agreeable with ultrasound to further characterize gallstones. [TJ]   0418 Troponin T Delta: 0 [TJ]   0600 Ultrasound without significant stone but there is distended gallbladder.  Patient's clinical presentation consistent with biliary colic.  Patient is much more comfortable nontoxic-appearing at this point labs are reassuring    I had a long conversation with patient and his family member regarding labs and imaging.  I suspect that patient is having biliary colic I do not think that patient is having an acute coronary event.  We discussed the potential for unstable angina though that seems much less likely.  I have offered the patient admission but he prefers to follow-up as outpatient.   he will follow-up with his cardiologist and with general surgery as an outpatient.  I gave him strong return instructions.  Answered all questions.  We used joint decision-making to arrive at a satisfactory care  plan. [TJ]      ED Course User Index  [TJ] Maciel Lin MD       I interpreted the cardiac monitor rhythm and my independent interpretation is: normal sinus rhythm.     PPE: The patient wore a mask and I wore an N95 mask throughout the entire patient encounter.       AS OF 07:15 EDT VITALS:    BP - 127/74  HR - 64  TEMP - 96.8 °F (36 °C) (Tympanic)  O2 SATS - 96%        DIAGNOSIS  Final diagnoses:   Biliary colic   Atypical chest pain         DISPOSITION  ED Disposition     ED Disposition   Discharge    Condition   Stable    Comment   --                Note Disclaimer: At [facility], we believe that sharing information builds trust and better relationships. You are receiving this note because you recently visited [facility]. It is possible you will see health information before a provider has talked with you about it. This kind of information can be easy to misunderstand. To help you fully understand what it means for your health, we urge you to discuss this note with your provider.       Maciel Lin MD  04/29/23 0732

## 2023-04-29 NOTE — ED TRIAGE NOTES
Pt to ED via PV for c/o of sudden onset upper abdominal pain that radiates to his back starting approx 1 hr ago. Pt reports having a cardiac hx and being seen by Dr. Briseno with [facility], pt took 2 81mg ASA PTA at home. Pt denies any N/V/D at this time.

## 2023-05-01 ENCOUNTER — OFFICE VISIT (OUTPATIENT)
Dept: FAMILY MEDICINE CLINIC | Facility: CLINIC | Age: 63
End: 2023-05-01
Payer: COMMERCIAL

## 2023-05-01 VITALS
SYSTOLIC BLOOD PRESSURE: 106 MMHG | TEMPERATURE: 98.1 F | WEIGHT: 205 LBS | RESPIRATION RATE: 16 BRPM | OXYGEN SATURATION: 98 % | DIASTOLIC BLOOD PRESSURE: 67 MMHG | HEART RATE: 82 BPM | BODY MASS INDEX: 28.7 KG/M2 | HEIGHT: 71 IN

## 2023-05-01 DIAGNOSIS — Z09 HOSPITAL DISCHARGE FOLLOW-UP: ICD-10-CM

## 2023-05-01 DIAGNOSIS — K80.50 BILIARY COLIC: Primary | ICD-10-CM

## 2023-05-01 PROCEDURE — 99213 OFFICE O/P EST LOW 20 MIN: CPT | Performed by: FAMILY MEDICINE

## 2023-05-01 NOTE — PROGRESS NOTES
Subjective   Chemo Lin is a 62 y.o. male.     CC: ED F/U for Colic    History of Present Illness     Patient returns today after being seen in the [facility] emergency department on 4/29/2023 with this note:    Date of encounter:  4/29/2023  PCP: Roly Barreto MD  Patient Care Team:  Roly Barreto MD as PCP - General  Annalee Benson APRN (Family Medicine)  Cedric Gurrola MD as Consulting Physician (Cardiology)  Kar Newman MD as Surgeon (Orthopedic Surgery)  Kelly Quintero APRN as Nurse Practitioner (Endocrinology)  Yoly Hall APRN as Nurse Practitioner (Urology)   Independent Historians: Patient     HPI:  Chief Complaint: Abdominal pain     A complete HPI/ROS/PMH/PSH/SH/FH are unobtainable due to: None     Chronic or social conditions impacting patient care (Social Determinants of Health): None  (Financial Resource Strain / Food Insecurity / Transportation Needs / Physical Activity / Stress / Social Connections / Intimate Partner Violence / Housing Stability)     Context: Chemo Lin is a 62 y.o. male who presents to the ED c/o acute right upper abdominal pain that radiates into the lower chest also radiates to his back.  Patient states that he ate some Mexican food this evening then awoke with fairly significant pain.  Denies any shortness of breath.  Does report some nausea but no vomiting.  Denies diaphoresis.  Patient does have extensive cardiac history with history of CAD and stenting.    Radiology:      FINDINGS: There is limited visualization of the pancreas. It appeared normal on the earlier study. No focal hepatic lesions are seen. There is no intra or extrahepatic biliary dilatation. Common bile duct measures 4 mm. Gallbladder does appear mildly distended, although there is no gallbladder wall thickening or pericholecystic fluid. No stones are identified within the gallbladder. Gallbladder wall measures 2 mm in thickness. Right kidney  "measures 11.3 x 5.1 x 5.3 cm. There is no hydronephrosis    1. Mild dependent atelectasis. 2. Patient does appear to have a stone within the cystic duct. I don't see any definite stones within the gallbladder itself, although ultrasound would be more sensitive for assessment. I don't see any definite gallbladder wall thickening or pericholecystic fluid. 3. Splenomegaly. Radiation dose reduction techniques were utilized, including automated exposure control and exposure modulation based on body size. This report was finalized on 4/29/2023 3:49 AM by Dr. Ceci Desir M.D.     I had a long conversation with patient and his family member regarding labs and imaging. I suspect that patient is having biliary colic I do not think that patient is having an acute coronary event. We discussed the potential for unstable angina though that seems much less likely. I have offered the patient admission but he prefers to follow-up as outpatient. he will follow-up with his cardiologist and with general surgery as an outpatient. I gave him strong return instructions. Answered all questions. We used joint decision-making to arrive at a satisfactory care plan. [TJ]  Current outpatient and discharge medications have been reconciled for the patient.  Reviewed by: Roly Barreto MD        The following portions of the patient's history were reviewed and updated as appropriate: allergies, current medications, past family history, past medical history, past social history, past surgical history and problem list.    Review of Systems   Constitutional: Negative for activity change, chills and fever.   Respiratory: Negative for cough.    Cardiovascular: Negative for chest pain.   Psychiatric/Behavioral: Negative for dysphoric mood.       /67   Pulse 82   Temp 98.1 °F (36.7 °C) (Oral)   Resp 16   Ht 180.3 cm (71\")   Wt 93 kg (205 lb)   SpO2 98%   BMI 28.59 kg/m²     Objective   Physical Exam  Constitutional:       General: He " is not in acute distress.     Appearance: He is well-developed.   Pulmonary:      Effort: Pulmonary effort is normal.   Neurological:      Mental Status: He is alert and oriented to person, place, and time.   Psychiatric:         Behavior: Behavior normal.         Thought Content: Thought content normal.     Hospital records reviewed with pt confirming HPI.      Assessment & Plan   Diagnoses and all orders for this visit:    1. Biliary colic (Primary)  -     Ambulatory Referral to General Surgery    2. Hospital discharge follow-up    Lower fat diet discussed.

## 2023-05-14 ENCOUNTER — HOSPITAL ENCOUNTER (OUTPATIENT)
Facility: HOSPITAL | Age: 63
Discharge: HOME | End: 2023-05-15
Attending: EMERGENCY MEDICINE | Admitting: SURGERY
Payer: COMMERCIAL

## 2023-05-14 DIAGNOSIS — K82.8 GALLBLADDER SLUDGE: Primary | ICD-10-CM

## 2023-05-14 DIAGNOSIS — R10.11 RUQ PAIN: ICD-10-CM

## 2023-05-14 DIAGNOSIS — K81.9 CHOLECYSTITIS: ICD-10-CM

## 2023-05-14 PROCEDURE — 84484 ASSAY OF TROPONIN QUANT: CPT | Performed by: EMERGENCY MEDICINE

## 2023-05-14 PROCEDURE — 99284 EMERGENCY DEPT VISIT MOD MDM: CPT

## 2023-05-14 PROCEDURE — 83605 ASSAY OF LACTIC ACID: CPT | Performed by: EMERGENCY MEDICINE

## 2023-05-14 PROCEDURE — 85025 COMPLETE CBC W/AUTO DIFF WBC: CPT

## 2023-05-14 PROCEDURE — 80053 COMPREHEN METABOLIC PANEL: CPT | Performed by: EMERGENCY MEDICINE

## 2023-05-14 PROCEDURE — 83690 ASSAY OF LIPASE: CPT | Performed by: EMERGENCY MEDICINE

## 2023-05-14 RX ORDER — SODIUM CHLORIDE 0.9 % (FLUSH) 0.9 %
10 SYRINGE (ML) INJECTION AS NEEDED
Status: DISCONTINUED | OUTPATIENT
Start: 2023-05-14 | End: 2023-05-15 | Stop reason: HOSPADM

## 2023-05-15 ENCOUNTER — READMISSION MANAGEMENT (OUTPATIENT)
Dept: CALL CENTER | Facility: HOSPITAL | Age: 63
End: 2023-05-15
Payer: COMMERCIAL

## 2023-05-15 ENCOUNTER — APPOINTMENT (OUTPATIENT)
Dept: GENERAL RADIOLOGY | Facility: HOSPITAL | Age: 63
End: 2023-05-15
Payer: COMMERCIAL

## 2023-05-15 ENCOUNTER — APPOINTMENT (OUTPATIENT)
Dept: ULTRASOUND IMAGING | Facility: HOSPITAL | Age: 63
End: 2023-05-15
Payer: COMMERCIAL

## 2023-05-15 ENCOUNTER — ANESTHESIA EVENT (OUTPATIENT)
Dept: PERIOP | Facility: HOSPITAL | Age: 63
End: 2023-05-15
Payer: COMMERCIAL

## 2023-05-15 ENCOUNTER — ANESTHESIA (OUTPATIENT)
Dept: PERIOP | Facility: HOSPITAL | Age: 63
End: 2023-05-15
Payer: COMMERCIAL

## 2023-05-15 VITALS
HEIGHT: 71 IN | DIASTOLIC BLOOD PRESSURE: 78 MMHG | SYSTOLIC BLOOD PRESSURE: 127 MMHG | BODY MASS INDEX: 28.7 KG/M2 | RESPIRATION RATE: 16 BRPM | TEMPERATURE: 97.5 F | HEART RATE: 84 BPM | OXYGEN SATURATION: 94 % | WEIGHT: 205 LBS

## 2023-05-15 PROBLEM — R10.11 RUQ PAIN: Status: ACTIVE | Noted: 2023-05-15

## 2023-05-15 PROBLEM — K80.50 BILIARY COLIC: Status: ACTIVE | Noted: 2023-05-15

## 2023-05-15 PROBLEM — K80.50 BILIARY COLIC: Status: RESOLVED | Noted: 2023-05-15 | Resolved: 2023-05-15

## 2023-05-15 LAB
ALBUMIN SERPL-MCNC: 3.9 G/DL (ref 3.5–5.2)
ALBUMIN SERPL-MCNC: 4.2 G/DL (ref 3.5–5.2)
ALBUMIN/GLOB SERPL: 2 G/DL
ALBUMIN/GLOB SERPL: 2.2 G/DL
ALP SERPL-CCNC: 55 U/L (ref 39–117)
ALP SERPL-CCNC: 68 U/L (ref 39–117)
ALT SERPL W P-5'-P-CCNC: 39 U/L (ref 1–41)
ALT SERPL W P-5'-P-CCNC: 45 U/L (ref 1–41)
ANION GAP SERPL CALCULATED.3IONS-SCNC: 10.7 MMOL/L (ref 5–15)
ANION GAP SERPL CALCULATED.3IONS-SCNC: 9.7 MMOL/L (ref 5–15)
AST SERPL-CCNC: 22 U/L (ref 1–40)
AST SERPL-CCNC: 25 U/L (ref 1–40)
BASOPHILS # BLD AUTO: 0.04 10*3/MM3 (ref 0–0.2)
BASOPHILS NFR BLD AUTO: 0.7 % (ref 0–1.5)
BILIRUB SERPL-MCNC: 0.4 MG/DL (ref 0–1.2)
BILIRUB SERPL-MCNC: 0.5 MG/DL (ref 0–1.2)
BILIRUB UR QL STRIP: NEGATIVE
BUN SERPL-MCNC: 24 MG/DL (ref 8–23)
BUN SERPL-MCNC: 27 MG/DL (ref 8–23)
BUN/CREAT SERPL: 21.3 (ref 7–25)
BUN/CREAT SERPL: 24.5 (ref 7–25)
CALCIUM SPEC-SCNC: 8.7 MG/DL (ref 8.6–10.5)
CALCIUM SPEC-SCNC: 9 MG/DL (ref 8.6–10.5)
CHLORIDE SERPL-SCNC: 101 MMOL/L (ref 98–107)
CHLORIDE SERPL-SCNC: 105 MMOL/L (ref 98–107)
CLARITY UR: CLEAR
CO2 SERPL-SCNC: 26.3 MMOL/L (ref 22–29)
CO2 SERPL-SCNC: 26.3 MMOL/L (ref 22–29)
COLOR UR: YELLOW
CREAT SERPL-MCNC: 0.98 MG/DL (ref 0.76–1.27)
CREAT SERPL-MCNC: 1.27 MG/DL (ref 0.76–1.27)
D-LACTATE SERPL-SCNC: 1.2 MMOL/L (ref 0.5–2)
DEPRECATED RDW RBC AUTO: 40.2 FL (ref 37–54)
DEPRECATED RDW RBC AUTO: 40.9 FL (ref 37–54)
EGFRCR SERPLBLD CKD-EPI 2021: 63.5 ML/MIN/1.73
EGFRCR SERPLBLD CKD-EPI 2021: 86.6 ML/MIN/1.73
EOSINOPHIL # BLD AUTO: 0.36 10*3/MM3 (ref 0–0.4)
EOSINOPHIL NFR BLD AUTO: 6.1 % (ref 0.3–6.2)
ERYTHROCYTE [DISTWIDTH] IN BLOOD BY AUTOMATED COUNT: 12.2 % (ref 12.3–15.4)
ERYTHROCYTE [DISTWIDTH] IN BLOOD BY AUTOMATED COUNT: 12.5 % (ref 12.3–15.4)
GEN 5 2HR TROPONIN T REFLEX: 13 NG/L
GLOBULIN UR ELPH-MCNC: 1.8 GM/DL
GLOBULIN UR ELPH-MCNC: 2.1 GM/DL
GLUCOSE BLDC GLUCOMTR-MCNC: 130 MG/DL (ref 70–130)
GLUCOSE BLDC GLUCOMTR-MCNC: 99 MG/DL (ref 70–130)
GLUCOSE SERPL-MCNC: 127 MG/DL (ref 65–99)
GLUCOSE SERPL-MCNC: 225 MG/DL (ref 65–99)
GLUCOSE UR STRIP-MCNC: ABNORMAL MG/DL
HCT VFR BLD AUTO: 41.6 % (ref 37.5–51)
HCT VFR BLD AUTO: 43.9 % (ref 37.5–51)
HGB BLD-MCNC: 14 G/DL (ref 13–17.7)
HGB BLD-MCNC: 14.7 G/DL (ref 13–17.7)
HGB UR QL STRIP.AUTO: NEGATIVE
HOLD SPECIMEN: NORMAL
HOLD SPECIMEN: NORMAL
IMM GRANULOCYTES # BLD AUTO: 0.02 10*3/MM3 (ref 0–0.05)
IMM GRANULOCYTES NFR BLD AUTO: 0.3 % (ref 0–0.5)
KETONES UR QL STRIP: NEGATIVE
LEUKOCYTE ESTERASE UR QL STRIP.AUTO: NEGATIVE
LIPASE SERPL-CCNC: 33 U/L (ref 13–60)
LYMPHOCYTES # BLD AUTO: 1.66 10*3/MM3 (ref 0.7–3.1)
LYMPHOCYTES NFR BLD AUTO: 28.3 % (ref 19.6–45.3)
MCH RBC QN AUTO: 30 PG (ref 26.6–33)
MCH RBC QN AUTO: 30.2 PG (ref 26.6–33)
MCHC RBC AUTO-ENTMCNC: 33.5 G/DL (ref 31.5–35.7)
MCHC RBC AUTO-ENTMCNC: 33.7 G/DL (ref 31.5–35.7)
MCV RBC AUTO: 89.1 FL (ref 79–97)
MCV RBC AUTO: 90.1 FL (ref 79–97)
MONOCYTES # BLD AUTO: 0.68 10*3/MM3 (ref 0.1–0.9)
MONOCYTES NFR BLD AUTO: 11.6 % (ref 5–12)
NEUTROPHILS NFR BLD AUTO: 3.11 10*3/MM3 (ref 1.7–7)
NEUTROPHILS NFR BLD AUTO: 53 % (ref 42.7–76)
NITRITE UR QL STRIP: NEGATIVE
NRBC BLD AUTO-RTO: 0 /100 WBC (ref 0–0.2)
PH UR STRIP.AUTO: <=5 [PH] (ref 5–8)
PLATELET # BLD AUTO: 159 10*3/MM3 (ref 140–450)
PLATELET # BLD AUTO: 184 10*3/MM3 (ref 140–450)
PMV BLD AUTO: 10.1 FL (ref 6–12)
PMV BLD AUTO: 10.6 FL (ref 6–12)
POTASSIUM SERPL-SCNC: 3.2 MMOL/L (ref 3.5–5.2)
POTASSIUM SERPL-SCNC: 3.8 MMOL/L (ref 3.5–5.2)
PROT SERPL-MCNC: 5.7 G/DL (ref 6–8.5)
PROT SERPL-MCNC: 6.3 G/DL (ref 6–8.5)
PROT UR QL STRIP: NEGATIVE
QT INTERVAL: 410 MS
RBC # BLD AUTO: 4.67 10*6/MM3 (ref 4.14–5.8)
RBC # BLD AUTO: 4.87 10*6/MM3 (ref 4.14–5.8)
SODIUM SERPL-SCNC: 138 MMOL/L (ref 136–145)
SODIUM SERPL-SCNC: 141 MMOL/L (ref 136–145)
SP GR UR STRIP: >=1.03 (ref 1–1.03)
TROPONIN T DELTA: 1 NG/L
TROPONIN T SERPL HS-MCNC: 12 NG/L
UROBILINOGEN UR QL STRIP: ABNORMAL
WBC NRBC COR # BLD: 5.51 10*3/MM3 (ref 3.4–10.8)
WBC NRBC COR # BLD: 5.87 10*3/MM3 (ref 3.4–10.8)
WHOLE BLOOD HOLD COAG: NORMAL
WHOLE BLOOD HOLD SPECIMEN: NORMAL

## 2023-05-15 PROCEDURE — G0378 HOSPITAL OBSERVATION PER HR: HCPCS

## 2023-05-15 PROCEDURE — 47563 LAPARO CHOLECYSTECTOMY/GRAPH: CPT | Performed by: SURGERY

## 2023-05-15 PROCEDURE — 76705 ECHO EXAM OF ABDOMEN: CPT

## 2023-05-15 PROCEDURE — 93005 ELECTROCARDIOGRAM TRACING: CPT | Performed by: EMERGENCY MEDICINE

## 2023-05-15 PROCEDURE — 25010000002 MORPHINE PER 10 MG: Performed by: EMERGENCY MEDICINE

## 2023-05-15 PROCEDURE — 96374 THER/PROPH/DIAG INJ IV PUSH: CPT

## 2023-05-15 PROCEDURE — 25010000002 FENTANYL CITRATE (PF) 50 MCG/ML SOLUTION: Performed by: NURSE ANESTHETIST, CERTIFIED REGISTERED

## 2023-05-15 PROCEDURE — 25010000002 SUGAMMADEX 200 MG/2ML SOLUTION: Performed by: NURSE ANESTHETIST, CERTIFIED REGISTERED

## 2023-05-15 PROCEDURE — 74300 X-RAY BILE DUCTS/PANCREAS: CPT

## 2023-05-15 PROCEDURE — 84484 ASSAY OF TROPONIN QUANT: CPT | Performed by: EMERGENCY MEDICINE

## 2023-05-15 PROCEDURE — 82948 REAGENT STRIP/BLOOD GLUCOSE: CPT

## 2023-05-15 PROCEDURE — 88304 TISSUE EXAM BY PATHOLOGIST: CPT | Performed by: SURGERY

## 2023-05-15 PROCEDURE — 81003 URINALYSIS AUTO W/O SCOPE: CPT | Performed by: EMERGENCY MEDICINE

## 2023-05-15 PROCEDURE — 47563 LAPARO CHOLECYSTECTOMY/GRAPH: CPT | Performed by: SPECIALIST/TECHNOLOGIST, OTHER

## 2023-05-15 PROCEDURE — 85027 COMPLETE CBC AUTOMATED: CPT | Performed by: EMERGENCY MEDICINE

## 2023-05-15 PROCEDURE — 25010000002 ONDANSETRON PER 1 MG: Performed by: EMERGENCY MEDICINE

## 2023-05-15 PROCEDURE — 25010000002 PROPOFOL 10 MG/ML EMULSION: Performed by: NURSE ANESTHETIST, CERTIFIED REGISTERED

## 2023-05-15 PROCEDURE — 99222 1ST HOSP IP/OBS MODERATE 55: CPT | Performed by: SURGERY

## 2023-05-15 PROCEDURE — 96375 TX/PRO/DX INJ NEW DRUG ADDON: CPT

## 2023-05-15 PROCEDURE — 25010000002 CEFAZOLIN IN DEXTROSE 2-4 GM/100ML-% SOLUTION: Performed by: SURGERY

## 2023-05-15 PROCEDURE — 0 IOTHALAMATE 60 % SOLUTION: Performed by: SURGERY

## 2023-05-15 PROCEDURE — 80053 COMPREHEN METABOLIC PANEL: CPT | Performed by: EMERGENCY MEDICINE

## 2023-05-15 PROCEDURE — 25010000002 ONDANSETRON PER 1 MG: Performed by: NURSE ANESTHETIST, CERTIFIED REGISTERED

## 2023-05-15 PROCEDURE — 25010000002 HYDROMORPHONE PER 4 MG: Performed by: NURSE ANESTHETIST, CERTIFIED REGISTERED

## 2023-05-15 PROCEDURE — 25010000002 DEXAMETHASONE SODIUM PHOSPHATE 20 MG/5ML SOLUTION: Performed by: NURSE ANESTHETIST, CERTIFIED REGISTERED

## 2023-05-15 PROCEDURE — 36415 COLL VENOUS BLD VENIPUNCTURE: CPT

## 2023-05-15 DEVICE — HORIZON TI ML 6 CLIPS/CART
Type: IMPLANTABLE DEVICE | Site: ABDOMEN | Status: FUNCTIONAL
Brand: WECK

## 2023-05-15 RX ORDER — NALOXONE HYDROCHLORIDE 4 MG/.1ML
SPRAY NASAL
Qty: 2 EACH | Refills: 0 | Status: SHIPPED | OUTPATIENT
Start: 2023-05-15

## 2023-05-15 RX ORDER — NALOXONE HCL 0.4 MG/ML
0.2 VIAL (ML) INJECTION AS NEEDED
Status: DISCONTINUED | OUTPATIENT
Start: 2023-05-15 | End: 2023-05-15 | Stop reason: HOSPADM

## 2023-05-15 RX ORDER — SODIUM CHLORIDE, SODIUM LACTATE, POTASSIUM CHLORIDE, CALCIUM CHLORIDE 600; 310; 30; 20 MG/100ML; MG/100ML; MG/100ML; MG/100ML
100 INJECTION, SOLUTION INTRAVENOUS CONTINUOUS
Status: DISCONTINUED | OUTPATIENT
Start: 2023-05-15 | End: 2023-05-15 | Stop reason: HOSPADM

## 2023-05-15 RX ORDER — ONDANSETRON 2 MG/ML
4 INJECTION INTRAMUSCULAR; INTRAVENOUS ONCE AS NEEDED
Status: DISCONTINUED | OUTPATIENT
Start: 2023-05-15 | End: 2023-05-15 | Stop reason: HOSPADM

## 2023-05-15 RX ORDER — OXYCODONE AND ACETAMINOPHEN 7.5; 325 MG/1; MG/1
1 TABLET ORAL EVERY 4 HOURS PRN
Status: DISCONTINUED | OUTPATIENT
Start: 2023-05-15 | End: 2023-05-15 | Stop reason: HOSPADM

## 2023-05-15 RX ORDER — POTASSIUM CHLORIDE 750 MG/1
40 TABLET, FILM COATED, EXTENDED RELEASE ORAL ONCE
Status: COMPLETED | OUTPATIENT
Start: 2023-05-15 | End: 2023-05-15

## 2023-05-15 RX ORDER — FENTANYL CITRATE 50 UG/ML
50 INJECTION, SOLUTION INTRAMUSCULAR; INTRAVENOUS
Status: DISCONTINUED | OUTPATIENT
Start: 2023-05-15 | End: 2023-05-15 | Stop reason: HOSPADM

## 2023-05-15 RX ORDER — MIDAZOLAM HYDROCHLORIDE 1 MG/ML
1 INJECTION INTRAMUSCULAR; INTRAVENOUS
Status: DISCONTINUED | OUTPATIENT
Start: 2023-05-15 | End: 2023-05-15 | Stop reason: HOSPADM

## 2023-05-15 RX ORDER — OXYCODONE HYDROCHLORIDE AND ACETAMINOPHEN 5; 325 MG/1; MG/1
1 TABLET ORAL EVERY 4 HOURS PRN
Status: DISCONTINUED | OUTPATIENT
Start: 2023-05-15 | End: 2023-05-15 | Stop reason: HOSPADM

## 2023-05-15 RX ORDER — SODIUM CHLORIDE 0.9 % (FLUSH) 0.9 %
3 SYRINGE (ML) INJECTION EVERY 12 HOURS SCHEDULED
Status: DISCONTINUED | OUTPATIENT
Start: 2023-05-15 | End: 2023-05-15 | Stop reason: HOSPADM

## 2023-05-15 RX ORDER — SODIUM CHLORIDE, SODIUM LACTATE, POTASSIUM CHLORIDE, CALCIUM CHLORIDE 600; 310; 30; 20 MG/100ML; MG/100ML; MG/100ML; MG/100ML
INJECTION, SOLUTION INTRAVENOUS CONTINUOUS PRN
Status: DISCONTINUED | OUTPATIENT
Start: 2023-05-15 | End: 2023-05-15 | Stop reason: SURG

## 2023-05-15 RX ORDER — MORPHINE SULFATE 2 MG/ML
2 INJECTION, SOLUTION INTRAMUSCULAR; INTRAVENOUS EVERY 4 HOURS PRN
Status: DISCONTINUED | OUTPATIENT
Start: 2023-05-15 | End: 2023-05-15 | Stop reason: HOSPADM

## 2023-05-15 RX ORDER — NITROGLYCERIN 0.4 MG/1
0.4 TABLET SUBLINGUAL
Status: DISCONTINUED | OUTPATIENT
Start: 2023-05-15 | End: 2023-05-15 | Stop reason: HOSPADM

## 2023-05-15 RX ORDER — NALOXONE HCL 0.4 MG/ML
0.4 VIAL (ML) INJECTION
Status: DISCONTINUED | OUTPATIENT
Start: 2023-05-15 | End: 2023-05-15 | Stop reason: HOSPADM

## 2023-05-15 RX ORDER — ONDANSETRON 4 MG/1
4 TABLET, FILM COATED ORAL EVERY 6 HOURS PRN
Qty: 10 TABLET | Refills: 0 | Status: SHIPPED | OUTPATIENT
Start: 2023-05-15 | End: 2023-05-25

## 2023-05-15 RX ORDER — HYDROCODONE BITARTRATE AND ACETAMINOPHEN 5; 325 MG/1; MG/1
1 TABLET ORAL EVERY 4 HOURS PRN
Qty: 10 TABLET | Refills: 0 | Status: SHIPPED | OUTPATIENT
Start: 2023-05-15 | End: 2023-05-25

## 2023-05-15 RX ORDER — ONDANSETRON 2 MG/ML
4 INJECTION INTRAMUSCULAR; INTRAVENOUS ONCE
Status: COMPLETED | OUTPATIENT
Start: 2023-05-15 | End: 2023-05-15

## 2023-05-15 RX ORDER — CEFAZOLIN SODIUM 2 G/100ML
2 INJECTION, SOLUTION INTRAVENOUS ONCE
Status: COMPLETED | OUTPATIENT
Start: 2023-05-15 | End: 2023-05-15

## 2023-05-15 RX ORDER — SODIUM CHLORIDE 0.9 % (FLUSH) 0.9 %
10 SYRINGE (ML) INJECTION AS NEEDED
Status: DISCONTINUED | OUTPATIENT
Start: 2023-05-15 | End: 2023-05-15 | Stop reason: HOSPADM

## 2023-05-15 RX ORDER — FENTANYL CITRATE 50 UG/ML
INJECTION, SOLUTION INTRAMUSCULAR; INTRAVENOUS AS NEEDED
Status: DISCONTINUED | OUTPATIENT
Start: 2023-05-15 | End: 2023-05-15 | Stop reason: SURG

## 2023-05-15 RX ORDER — ACETAMINOPHEN 325 MG/1
650 TABLET ORAL EVERY 4 HOURS PRN
Status: DISCONTINUED | OUTPATIENT
Start: 2023-05-15 | End: 2023-05-15 | Stop reason: HOSPADM

## 2023-05-15 RX ORDER — HYDRALAZINE HYDROCHLORIDE 20 MG/ML
5 INJECTION INTRAMUSCULAR; INTRAVENOUS
Status: DISCONTINUED | OUTPATIENT
Start: 2023-05-15 | End: 2023-05-15 | Stop reason: HOSPADM

## 2023-05-15 RX ORDER — LIDOCAINE HYDROCHLORIDE 10 MG/ML
0.5 INJECTION, SOLUTION EPIDURAL; INFILTRATION; INTRACAUDAL; PERINEURAL ONCE AS NEEDED
Status: DISCONTINUED | OUTPATIENT
Start: 2023-05-15 | End: 2023-05-15 | Stop reason: HOSPADM

## 2023-05-15 RX ORDER — LIDOCAINE HYDROCHLORIDE 20 MG/ML
INJECTION, SOLUTION INFILTRATION; PERINEURAL AS NEEDED
Status: DISCONTINUED | OUTPATIENT
Start: 2023-05-15 | End: 2023-05-15 | Stop reason: SURG

## 2023-05-15 RX ORDER — HYDROCODONE BITARTRATE AND ACETAMINOPHEN 7.5; 325 MG/1; MG/1
1 TABLET ORAL ONCE AS NEEDED
Status: COMPLETED | OUTPATIENT
Start: 2023-05-15 | End: 2023-05-15

## 2023-05-15 RX ORDER — SODIUM CHLORIDE 0.9 % (FLUSH) 0.9 %
10 SYRINGE (ML) INJECTION EVERY 12 HOURS SCHEDULED
Status: DISCONTINUED | OUTPATIENT
Start: 2023-05-15 | End: 2023-05-15 | Stop reason: HOSPADM

## 2023-05-15 RX ORDER — DROPERIDOL 2.5 MG/ML
0.62 INJECTION, SOLUTION INTRAMUSCULAR; INTRAVENOUS
Status: DISCONTINUED | OUTPATIENT
Start: 2023-05-15 | End: 2023-05-15 | Stop reason: HOSPADM

## 2023-05-15 RX ORDER — DEXAMETHASONE SODIUM PHOSPHATE 4 MG/ML
INJECTION, SOLUTION INTRA-ARTICULAR; INTRALESIONAL; INTRAMUSCULAR; INTRAVENOUS; SOFT TISSUE AS NEEDED
Status: DISCONTINUED | OUTPATIENT
Start: 2023-05-15 | End: 2023-05-15 | Stop reason: SURG

## 2023-05-15 RX ORDER — DIPHENHYDRAMINE HYDROCHLORIDE 50 MG/ML
12.5 INJECTION INTRAMUSCULAR; INTRAVENOUS
Status: DISCONTINUED | OUTPATIENT
Start: 2023-05-15 | End: 2023-05-15 | Stop reason: HOSPADM

## 2023-05-15 RX ORDER — BUPIVACAINE HYDROCHLORIDE AND EPINEPHRINE 5; 5 MG/ML; UG/ML
INJECTION, SOLUTION EPIDURAL; INTRACAUDAL; PERINEURAL AS NEEDED
Status: DISCONTINUED | OUTPATIENT
Start: 2023-05-15 | End: 2023-05-15 | Stop reason: HOSPADM

## 2023-05-15 RX ORDER — FLUMAZENIL 0.1 MG/ML
0.2 INJECTION INTRAVENOUS AS NEEDED
Status: DISCONTINUED | OUTPATIENT
Start: 2023-05-15 | End: 2023-05-15 | Stop reason: HOSPADM

## 2023-05-15 RX ORDER — ROCURONIUM BROMIDE 10 MG/ML
INJECTION, SOLUTION INTRAVENOUS AS NEEDED
Status: DISCONTINUED | OUTPATIENT
Start: 2023-05-15 | End: 2023-05-15 | Stop reason: SURG

## 2023-05-15 RX ORDER — PROMETHAZINE HYDROCHLORIDE 25 MG/1
25 SUPPOSITORY RECTAL ONCE AS NEEDED
Status: DISCONTINUED | OUTPATIENT
Start: 2023-05-15 | End: 2023-05-15 | Stop reason: HOSPADM

## 2023-05-15 RX ORDER — PROMETHAZINE HYDROCHLORIDE 25 MG/1
25 TABLET ORAL ONCE AS NEEDED
Status: DISCONTINUED | OUTPATIENT
Start: 2023-05-15 | End: 2023-05-15 | Stop reason: HOSPADM

## 2023-05-15 RX ORDER — MORPHINE SULFATE 2 MG/ML
4 INJECTION, SOLUTION INTRAMUSCULAR; INTRAVENOUS ONCE
Status: COMPLETED | OUTPATIENT
Start: 2023-05-15 | End: 2023-05-15

## 2023-05-15 RX ORDER — FENTANYL CITRATE 50 UG/ML
50 INJECTION, SOLUTION INTRAMUSCULAR; INTRAVENOUS ONCE AS NEEDED
Status: DISCONTINUED | OUTPATIENT
Start: 2023-05-15 | End: 2023-05-15 | Stop reason: HOSPADM

## 2023-05-15 RX ORDER — ONDANSETRON 4 MG/1
4 TABLET, FILM COATED ORAL EVERY 6 HOURS PRN
Status: DISCONTINUED | OUTPATIENT
Start: 2023-05-15 | End: 2023-05-15 | Stop reason: HOSPADM

## 2023-05-15 RX ORDER — MAGNESIUM HYDROXIDE 1200 MG/15ML
LIQUID ORAL AS NEEDED
Status: DISCONTINUED | OUTPATIENT
Start: 2023-05-15 | End: 2023-05-15 | Stop reason: HOSPADM

## 2023-05-15 RX ORDER — ONDANSETRON 2 MG/ML
INJECTION INTRAMUSCULAR; INTRAVENOUS AS NEEDED
Status: DISCONTINUED | OUTPATIENT
Start: 2023-05-15 | End: 2023-05-15 | Stop reason: SURG

## 2023-05-15 RX ORDER — HYDROMORPHONE HYDROCHLORIDE 1 MG/ML
0.5 INJECTION, SOLUTION INTRAMUSCULAR; INTRAVENOUS; SUBCUTANEOUS
Status: DISCONTINUED | OUTPATIENT
Start: 2023-05-15 | End: 2023-05-15 | Stop reason: HOSPADM

## 2023-05-15 RX ORDER — PROPOFOL 10 MG/ML
VIAL (ML) INTRAVENOUS AS NEEDED
Status: DISCONTINUED | OUTPATIENT
Start: 2023-05-15 | End: 2023-05-15 | Stop reason: SURG

## 2023-05-15 RX ORDER — EPHEDRINE SULFATE 50 MG/ML
5 INJECTION, SOLUTION INTRAVENOUS ONCE AS NEEDED
Status: DISCONTINUED | OUTPATIENT
Start: 2023-05-15 | End: 2023-05-15 | Stop reason: HOSPADM

## 2023-05-15 RX ORDER — IPRATROPIUM BROMIDE AND ALBUTEROL SULFATE 2.5; .5 MG/3ML; MG/3ML
3 SOLUTION RESPIRATORY (INHALATION) ONCE AS NEEDED
Status: DISCONTINUED | OUTPATIENT
Start: 2023-05-15 | End: 2023-05-15 | Stop reason: HOSPADM

## 2023-05-15 RX ORDER — SODIUM CHLORIDE, SODIUM LACTATE, POTASSIUM CHLORIDE, CALCIUM CHLORIDE 600; 310; 30; 20 MG/100ML; MG/100ML; MG/100ML; MG/100ML
9 INJECTION, SOLUTION INTRAVENOUS CONTINUOUS
Status: DISCONTINUED | OUTPATIENT
Start: 2023-05-15 | End: 2023-05-15 | Stop reason: HOSPADM

## 2023-05-15 RX ORDER — FAMOTIDINE 10 MG/ML
20 INJECTION, SOLUTION INTRAVENOUS ONCE
Status: COMPLETED | OUTPATIENT
Start: 2023-05-15 | End: 2023-05-15

## 2023-05-15 RX ORDER — SODIUM CHLORIDE 9 MG/ML
40 INJECTION, SOLUTION INTRAVENOUS AS NEEDED
Status: DISCONTINUED | OUTPATIENT
Start: 2023-05-15 | End: 2023-05-15 | Stop reason: HOSPADM

## 2023-05-15 RX ORDER — CHOLECALCIFEROL (VITAMIN D3) 125 MCG
5 CAPSULE ORAL NIGHTLY PRN
Status: DISCONTINUED | OUTPATIENT
Start: 2023-05-15 | End: 2023-05-15 | Stop reason: HOSPADM

## 2023-05-15 RX ORDER — LABETALOL HYDROCHLORIDE 5 MG/ML
5 INJECTION, SOLUTION INTRAVENOUS
Status: DISCONTINUED | OUTPATIENT
Start: 2023-05-15 | End: 2023-05-15 | Stop reason: HOSPADM

## 2023-05-15 RX ORDER — ONDANSETRON 2 MG/ML
4 INJECTION INTRAMUSCULAR; INTRAVENOUS EVERY 6 HOURS PRN
Status: DISCONTINUED | OUTPATIENT
Start: 2023-05-15 | End: 2023-05-15 | Stop reason: HOSPADM

## 2023-05-15 RX ORDER — SODIUM CHLORIDE 0.9 % (FLUSH) 0.9 %
3-10 SYRINGE (ML) INJECTION AS NEEDED
Status: DISCONTINUED | OUTPATIENT
Start: 2023-05-15 | End: 2023-05-15 | Stop reason: HOSPADM

## 2023-05-15 RX ADMIN — LIDOCAINE HYDROCHLORIDE 100 MG: 20 INJECTION, SOLUTION INFILTRATION; PERINEURAL at 11:31

## 2023-05-15 RX ADMIN — MORPHINE SULFATE 4 MG: 2 INJECTION, SOLUTION INTRAMUSCULAR; INTRAVENOUS at 00:22

## 2023-05-15 RX ADMIN — POTASSIUM CHLORIDE 40 MEQ: 750 TABLET, EXTENDED RELEASE ORAL at 07:56

## 2023-05-15 RX ADMIN — FENTANYL CITRATE 25 MCG: 50 INJECTION, SOLUTION INTRAMUSCULAR; INTRAVENOUS at 11:53

## 2023-05-15 RX ADMIN — ROCURONIUM BROMIDE 50 MG: 10 INJECTION, SOLUTION INTRAVENOUS at 11:31

## 2023-05-15 RX ADMIN — SODIUM CHLORIDE, POTASSIUM CHLORIDE, SODIUM LACTATE AND CALCIUM CHLORIDE: 600; 310; 30; 20 INJECTION, SOLUTION INTRAVENOUS at 11:28

## 2023-05-15 RX ADMIN — SODIUM CHLORIDE, POTASSIUM CHLORIDE, SODIUM LACTATE AND CALCIUM CHLORIDE 100 ML/HR: 600; 310; 30; 20 INJECTION, SOLUTION INTRAVENOUS at 04:32

## 2023-05-15 RX ADMIN — ROCURONIUM BROMIDE 10 MG: 10 INJECTION, SOLUTION INTRAVENOUS at 11:48

## 2023-05-15 RX ADMIN — Medication 10 ML: at 07:56

## 2023-05-15 RX ADMIN — CEFAZOLIN SODIUM 2 G: 2 INJECTION, SOLUTION INTRAVENOUS at 11:44

## 2023-05-15 RX ADMIN — ONDANSETRON 4 MG: 2 INJECTION INTRAMUSCULAR; INTRAVENOUS at 00:22

## 2023-05-15 RX ADMIN — CEFAZOLIN SODIUM 2 G: 2 INJECTION, SOLUTION INTRAVENOUS at 11:23

## 2023-05-15 RX ADMIN — HYDROCODONE BITARTRATE AND ACETAMINOPHEN 1 TABLET: 7.5; 325 TABLET ORAL at 14:16

## 2023-05-15 RX ADMIN — PROPOFOL 50 MG: 10 INJECTION, EMULSION INTRAVENOUS at 11:53

## 2023-05-15 RX ADMIN — DEXAMETHASONE SODIUM PHOSPHATE 8 MG: 4 INJECTION, SOLUTION INTRAMUSCULAR; INTRAVENOUS at 11:31

## 2023-05-15 RX ADMIN — SUGAMMADEX 200 MG: 100 INJECTION, SOLUTION INTRAVENOUS at 12:10

## 2023-05-15 RX ADMIN — HYDROMORPHONE HYDROCHLORIDE 0.5 MG: 1 INJECTION, SOLUTION INTRAMUSCULAR; INTRAVENOUS; SUBCUTANEOUS at 13:08

## 2023-05-15 RX ADMIN — FENTANYL CITRATE 50 MCG: 50 INJECTION, SOLUTION INTRAMUSCULAR; INTRAVENOUS at 11:31

## 2023-05-15 RX ADMIN — Medication 10 ML: at 08:45

## 2023-05-15 RX ADMIN — Medication 10 ML: at 03:46

## 2023-05-15 RX ADMIN — ONDANSETRON 4 MG: 2 INJECTION INTRAMUSCULAR; INTRAVENOUS at 12:05

## 2023-05-15 RX ADMIN — FAMOTIDINE 20 MG: 10 INJECTION INTRAVENOUS at 11:10

## 2023-05-15 RX ADMIN — SODIUM CHLORIDE, POTASSIUM CHLORIDE, SODIUM LACTATE AND CALCIUM CHLORIDE 9 ML/HR: 600; 310; 30; 20 INJECTION, SOLUTION INTRAVENOUS at 11:11

## 2023-05-15 RX ADMIN — PROPOFOL 50 MG: 10 INJECTION, EMULSION INTRAVENOUS at 11:43

## 2023-05-15 RX ADMIN — PROPOFOL 200 MG: 10 INJECTION, EMULSION INTRAVENOUS at 11:31

## 2023-05-15 RX ADMIN — FENTANYL CITRATE 25 MCG: 50 INJECTION, SOLUTION INTRAMUSCULAR; INTRAVENOUS at 11:48

## 2023-05-15 RX ADMIN — HYDROMORPHONE HYDROCHLORIDE 0.5 MG: 1 INJECTION, SOLUTION INTRAMUSCULAR; INTRAVENOUS; SUBCUTANEOUS at 13:21

## 2023-05-15 NOTE — OP NOTE
PREOPERATIVE DIAGNOSIS:  Cholecystitis and cholelithiasis    POSTOPERATIVE DIAGNOSIS (FINDINGS):  · Cholecystitis with marked gallbladder wall edema  · Thick gallbladder sludge with 1 small cholesterol stone noted within the gallbladder on my inspection    PROCEDURE:  Laparoscopic cholecystectomy with cholangiogram    SURGEON:  Kedar Reyes MD    ASSISTANT:  Estrella Rees was responsible for performing the following activities: suction, irrigation, suturing, closing, retraction, camera holding, and placing dressing, and their skilled assistance was necessary for the success of this case.    ANESTHESIA:  General    EBL:  Minimal    SPECIMEN(S):  Gallbladder    DESCRIPTION:  Supine position. General anesthesia. Prepped and draped, usual sterile manner.    0.5% Marcaine with epinephrine infiltrated in all incision sites. Small periumbilical incision made, Veress needle inserted with upward traction on abdominal wall. Abdomen insufflated to 15 mm Hg pressure and 5 mm Optiview trocar inserted followed by 10 mm subxiphoid and 5 mm right subcostal trocars.    Gallbladder was distended and markedly edematous.  Gallbladder was grasped and elevated. Cystic duct dissected and clipped once distally. Cholangiogram catheter inserted and cholangiogram demonstrated prompt filling of duodenum, no filling defects. Cystic duct clipped twice proximally and divided. Cystic artery clipped twice proximally, once distally and divided. Gallbladder removed from the liver bed with electrocautery, placed in an Endo Catch bag, and removed through subxiphoid trocar site. Liver bed copiously irrigated and aspirated with good hemostasis noted. CO2 released, fascia of subxiphoid trocar site closed with 0-Vicryl, skin edges 5-0 Vicryl subcuticular suture.  Exofin applied.    Tolerated well.  Stable to PACU.    Kedar Reyes M.D.

## 2023-05-15 NOTE — OUTREACH NOTE
Prep Survey    Flowsheet Row Responses   [facility] facility patient discharged from? [facility]   Is LACE score < 7 ? Yes   Eligibility [facility]   Date of Admission 05/14/23   Date of Discharge 05/15/23   Discharge Disposition Home or Self Care   Discharge diagnosis Lap Cholecystectomy   Does the patient have one of the following disease processes/diagnoses(primary or secondary)? General Surgery   Does the patient have Home health ordered? No   Is there a DME ordered? No   Prep survey completed? Yes          JEAN-CLAUDE FRANKLIN - Registered Nurse

## 2023-05-15 NOTE — H&P
ASSESSMENT/PLAN:    63-year-old gentleman with classic symptoms of biliary colic and 2 episodes leading to emergency room visits.  Probable small gallstone impacting cystic duct.  He understands the options and wishes to proceed with laparoscopic cholecystectomy.  He understands nature of the procedure and the risks including but not limited to bleeding, infection, conversion to open procedure, postoperative bile leak, and the bowel function changes which can accompany cholecystectomy.  Increased surgical and anesthetic risk secondary to multiple medical comorbidities including coronary disease, hypertension, and diabetes.    CC:     Abdominal pain    HPI:    63-year-old gentleman presents emergency room with severe right upper quadrant abdominal pain radiating to the back and associated with nausea following a fatty meal.  He was in the emergency room approximately 2 weeks ago for similar symptoms also following fatty meal.    Relevant review of systems negative other than presenting complaints.    ENDOSCOPY:   • Colonoscopy 3/21/2012 Dr. Devonte Vogt: Normal    RADIOLOGY:   • CT abdomen pelvis and CT angiogram chest: There did appear to be a stone within the cystic duct.  No definite stones within the gallbladder itself.  On my review of images, there is a small calcified filling defect in the cystic duct likely representing small gallstone.  Ultrasound that same day showed distended gallbladder but no definite stone.  Ultrasound 5/15/2023 showed possible gallbladder sludge but no definite stone.  On my review of images, the stone seen on CT scan is quite small in its location in the cystic duct would make it somewhat difficult to see on ultrasound.    LABS:    • WBC 5.5  • Hemoglobin 14  • Platelets 159  • Glucose 127  • Potassium 3.2  • LFTs and bilirubin normal  • Urinalysis: Glucose greater than 1000, otherwise normal    SOCIAL HISTORY:   • Denies tobacco use  • Denies alcohol use    FAMILY HISTORY:     • Colorectal cancer: Negative  • Gallbladder disease: Mother    PREVIOUS ABDOMINAL SURGERY    • None    PAST MEDICAL HISTORY:    • Coronary artery disease, coronary stent placed 10 years ago  • Type 2 diabetes mellitus  • Hypertension  • Hyperlipidemia  • Chronic back pain    MEDICATIONS:   • Aspirin 81 mg  • Zyrtec  • Chlorthalidone  • Jardiance  • Zetia  • Glipizide  • Norco  • Metformin  • Crestor  • Diovan  • Flonase  • Cialis    ALLERGIES:   • None    PHYSICAL EXAM:   • Constitutional: Well-developed well-nourished, no acute distress  • Vital signs:   o Weight: 205 pounds  o Height: 71 inches  o BMI: 28.6  • Neck: Supple, no palpable mass, trachea midline  • Respiratory: Normal nonlabored inspiratory effort  • Cardiovascular: Regular rate, no jugular venous distention  • Gastrointestinal: Soft, nondistended, nontender at present  • Psychiatric: Alert and oriented ×3, normal affect     VIOLETTE HARGROVE M.D.

## 2023-05-15 NOTE — H&P
[facility]   HISTORY AND PHYSICAL    Patient Name: Chemo Lin  : 1960  MRN: [id number]  Primary Care Physician:  Roly Barreto MD  Date of admission: 2023    Subjective   Subjective     Chief Complaint:   Chief Complaint   Patient presents with   • Abdominal Pain   • Nausea   • Vomiting         HPI:    Chemo Lin is a 63 y.o. male with a history of CAD, type 2 diabetes, hypertension, and hyperlipidemia who presents to [facility] ER with right upper quadrant pain.  Patient states he went to eat dinner at [employer] where he had some chicken tenders and shortly afterwards started having severe right upper quadrant pain that radiated into the left upper quadrant as well as into his back.  Reports some nausea but denies vomiting.  Denies any fevers or chills.  Denies chest pain or shortness of breath.  Denies pain with urination or change in frequency.  He reports the pain is better after pain medication in the ER.  He states he was here 2 weeks ago with similar pain but it was not as bad.  He states he was scheduled to follow-up outpatient with general surgery in a couple of days.  Denies tobacco and regular alcohol use.    2 weeks ago patient was seen in the ER 2023 in which a CT abdomen and pelvis with read the appearance of a stone in the cystic duct.  Gallbladder ultrasound on the same day read that gallbladder appeared mildly distended with no wall thickening or pericholecystic fluid and no stones noted within the gallbladder.    Today in the ER, gallbladder ultrasound shows possible gallbladder sludge.  Lab work fairly unremarkable other than an elevated glucose of 224.  Patient was given analgesics in the ER with improvement of symptoms.  Patient was placed on 2 L via nasal cannula after IV pain medication administered.    Review of Systems   All systems were reviewed and negative except for: what is mentioned above in the HPI    Personal History     Past  Medical History:   Diagnosis Date   • Allergic rhinitis    • Arthritis of back 11-15-21    Back xray from [facility]   • Coronary artery disease    • History of echocardiogram 04/15/2020    11/26/2019-Calculated EF = 63% Mild mitral valve regurgitation is present Mild tricuspid valve regurgitation is present.      • History of stress test 04/15/2020    11/26/2019-Diaphragmatic attenuation artifact is present. Left ventricular ejection fraction is normal (Calculated EF = 64%). Myocardial perfusion imaging indicates a normal myocardial perfusion study with no evidence of ischemia. Impressions are consistent with a low risk study. Findings consistent with an equivocal ECG stress test   • Hyperlipidemia    • Hypertension     type 2 diabetes mellitus, uncontrolled   • Rotator cuff syndrome 12-23-21    MRI from [facility]   • Tendinitis    • Type 2 diabetes mellitus        Past Surgical History:   Procedure Laterality Date   • CATARACT EXTRACTION, BILATERAL     • COLONOSCOPY     • CORONARY ANGIOPLASTY WITH STENT PLACEMENT      10 years ago   • FOOT SURGERY Right     great toe joint repair   • SHOULDER ARTHROSCOPY W/ ROTATOR CUFF REPAIR Right 02/18/2022    Procedure: SHOULDER ARTHROSCOPY WITH ROTATOR CUFF REPAIR COMPLEX, OPEN BICEPS TENODESIS;  Surgeon: Kar Newman MD;  Location: [facility];  Service: Orthopedics;  Laterality: Right;       Family History: family history includes Cancer in his father; Diabetes in his father, maternal grandmother, and paternal grandmother; Heart disease in his father; Hypertension in his father and mother; Lung cancer in his father. Otherwise pertinent FHx was reviewed and not pertinent to current issue.    Social History:  reports that he has never smoked. He has never used smokeless tobacco. He reports that he does not drink alcohol and does not use drugs.    Home Medications:  Chlorcyclizine-Pseudoephed, HYDROcodone-acetaminophen, Insulin Pen Needle, OneTouch Delica Lancets 33G, aspirin,  cetirizine, chlorthalidone, empagliflozin, ezetimibe, fluticasone, glipizide, glucose blood, insulin degludec, metFORMIN ER, montelukast, rosuvastatin, tadalafil, triamcinolone, and valsartan    Allergies:  No Known Allergies    Objective   Objective     Vitals:   Temp:  [96.8 °F (36 °C)] 96.8 °F (36 °C)  Heart Rate:  [69-78] 69  Resp:  [16-18] 16  BP: (127-161)/(80-91) 127/80  Flow (L/min):  [2] 2  Physical Exam    Constitutional: 63-year-old male in no acute distress on 2 L via nasal cannula.   Eyes: PERRLA, sclerae anicteric, no conjunctival injection   HENT: NCAT, mucous membranes moist   Neck: Supple, no thyromegaly, no lymphadenopathy, trachea midline   Respiratory: Clear to auscultation bilaterally, nonlabored respirations    Cardiovascular: RRR, no murmurs, rubs, or gallops, palpable pedal pulses bilaterally   Gastrointestinal: Positive bowel sounds, soft, mild tenderness to palpation in the right upper quadrant with no guarding or rebound, nondistended   Musculoskeletal: No bilateral ankle edema, no clubbing or cyanosis to extremities   Psychiatric: Appropriate affect, cooperative   Neurologic: Oriented x 3, strength symmetric in all extremities, Cranial Nerves grossly intact to confrontation, speech clear   Skin: No rashes     Result Review    Result Review:  I have personally reviewed the results from the time of this admission to 5/15/2023 03:03 EDT and agree with these findings:  [x]  Laboratory list / accordion  []  Microbiology  [x]  Radiology  []  EKG/Telemetry   []  Cardiology/Vascular   []  Pathology  [x]  Old records  []  Other:  Most notable findings include:     CT Abdomen Pelvis With Contrast    Result Date: 4/29/2023  CT ANGIOGRAM OF THE CHEST; CT OF THE ABDOMEN AND PELVIS WITH CONTRAST  HISTORY: Pleuritic chest pain and upper abdominal pain  COMPARISON: None available.  TECHNIQUE: Axial CT imaging was obtained through the thorax. IV contrast was administered. Three-D reformatted images were  obtained.  FINDINGS: CT CHEST: No acute pulmonary thromboembolus is seen. There is dilatation of the aortic root, measuring up to 4 cm. There is an aberrant right subclavian artery. The remainder of the thoracic aorta is normal in caliber. No obvious dissection is seen. There are coronary artery calcifications. The thyroid gland, trachea, and esophagus appear unremarkable. Mediastinal lymph nodes do not appear pathologically enlarged. There is dependent atelectasis. No acute osseous abnormalities are seen.  CT OF THE ABDOMEN AND PELVIS: No suspicious hepatic lesions are seen. The stomach, duodenum, adrenal glands, and pancreas all appear normal. Patient does appear to have a stone within the cystic duct, although I don't see any additional stones within the gallbladder. However, ultrasound would be more sensitive for assessment. Patient does have splenomegaly, with the spleen measuring up to 13.9 cm in AP dimensions. The kidneys enhance symmetrically. There is no hydronephrosis. No distal ureteral or bladder stones are seen. Prostate gland is within normal. There is no bowel obstruction. The appendix is normal. There is atherosclerotic involvement of the aorta. No acute osseous abnormalities are seen.       1. Mild dependent atelectasis. 2. Patient does appear to have a stone within the cystic duct. I don't see any definite stones within the gallbladder itself, although ultrasound would be more sensitive for assessment. I don't see any definite gallbladder wall thickening or pericholecystic fluid. 3. Splenomegaly.  Radiation dose reduction techniques were utilized, including automated exposure control and exposure modulation based on body size.  This report was finalized on 4/29/2023 3:49 AM by Dr. Ceci Desir M.D.      US Gallbladder    Result Date: 5/15/2023  GALLBLADDER ULTRASOUND  HISTORY: Right upper quadrant pain  COMPARISON: 04/29/2023  TECHNIQUE: Grayscale and color Doppler sonographic images were  obtained through the right upper quadrant  FINDINGS: There is limited visualization of the pancreas. No suspicious hepatic lesions are seen. There is no intra or extrahepatic biliary dilatation. Common bile duct measures 4 mm. Initially, no stones were identified within the gallbladder. However, on decubitus imaging, there is a suggestion of some sludge within the gallbladder. There is no gallbladder wall thickening or pericholecystic fluid. Gallbladder wall measures 2 mm in thickness. Right kidney measures 10.6 x 5.4 x 4.7 cm. No hydronephrosis is seen.      Possible gallbladder sludge.  This report was finalized on 5/15/2023 1:10 AM by Dr. Ceci Desir M.D.      US Gallbladder    Result Date: 4/29/2023  GALLBLADDER ULTRASOUND  HISTORY: Cystic duct stone  COMPARISON: 04/29/2023  TECHNIQUE: Grayscale and color Doppler sonographic images were obtained through the right upper quadrant.  FINDINGS: There is limited visualization of the pancreas. It appeared normal on the earlier study. No focal hepatic lesions are seen. There is no intra or extrahepatic biliary dilatation. Common bile duct measures 4 mm. Gallbladder does appear mildly distended, although there is no gallbladder wall thickening or pericholecystic fluid. No stones are identified within the gallbladder. Gallbladder wall measures 2 mm in thickness. Right kidney measures 11.3 x 5.1 x 5.3 cm. There is no hydronephrosis.      Gallbladder appears mildly distended, although there is no gallbladder wall thickening or pericholecystic fluid. No stones are noted within the gallbladder.  This report was finalized on 4/29/2023 4:40 AM by Dr. Ceci Desir M.D.      CT Angiogram Chest    Result Date: 4/29/2023  CT ANGIOGRAM OF THE CHEST; CT OF THE ABDOMEN AND PELVIS WITH CONTRAST  HISTORY: Pleuritic chest pain and upper abdominal pain  COMPARISON: None available.  TECHNIQUE: Axial CT imaging was obtained through the thorax. IV contrast was administered.  Three-D reformatted images were obtained.  FINDINGS: CT CHEST: No acute pulmonary thromboembolus is seen. There is dilatation of the aortic root, measuring up to 4 cm. There is an aberrant right subclavian artery. The remainder of the thoracic aorta is normal in caliber. No obvious dissection is seen. There are coronary artery calcifications. The thyroid gland, trachea, and esophagus appear unremarkable. Mediastinal lymph nodes do not appear pathologically enlarged. There is dependent atelectasis. No acute osseous abnormalities are seen.  CT OF THE ABDOMEN AND PELVIS: No suspicious hepatic lesions are seen. The stomach, duodenum, adrenal glands, and pancreas all appear normal. Patient does appear to have a stone within the cystic duct, although I don't see any additional stones within the gallbladder. However, ultrasound would be more sensitive for assessment. Patient does have splenomegaly, with the spleen measuring up to 13.9 cm in AP dimensions. The kidneys enhance symmetrically. There is no hydronephrosis. No distal ureteral or bladder stones are seen. Prostate gland is within normal. There is no bowel obstruction. The appendix is normal. There is atherosclerotic involvement of the aorta. No acute osseous abnormalities are seen.       1. Mild dependent atelectasis. 2. Patient does appear to have a stone within the cystic duct. I don't see any definite stones within the gallbladder itself, although ultrasound would be more sensitive for assessment. I don't see any definite gallbladder wall thickening or pericholecystic fluid. 3. Splenomegaly.  Radiation dose reduction techniques were utilized, including automated exposure control and exposure modulation based on body size.  This report was finalized on 4/29/2023 3:49 AM by Dr. Ceci Desir M.D.        Assessment & Plan   Assessment / Plan     Brief Patient Summary:  Chemo Lin is a 63 y.o. male who is being admitted to the observation unit with right  upper quadrant pain and suspicion of biliary colic.  Plan for pain control and general surgery consultation.    Active Hospital Problems:  Active Hospital Problems    Diagnosis    • **RUQ pain      Plan:     Right upper quadrant pain  -Gallbladder ultrasound shows possible biliary sludge  -T. bili 0.5, AST 25, ALT 45, alk phos 68  -General surgery consulted  -Analgesics as needed  -IV fluid  -N.p.o.    Type 2 diabetes  -Hold home oral antidiabetic medications  -Sliding scale insulin and Accu-Cheks every 6 hours    Hypertension  -Continue close chlorthalidone, valsartan  -Monitor with vital signs every 4 hours    Hyperlipidemia  -Continue statin    DVT prophylaxis:  Mechanical DVT prophylaxis orders are present.    CODE STATUS:    Code Status (Patient has no pulse and is not breathing): CPR (Attempt to Resuscitate)  Medical Interventions (Patient has pulse or is breathing): Full Support    Admission Status:  I believe this patient meets observation status.    75 minutes have been spent by [facility] providers in the care of this patient while under observation status.      I wore an face mask, eye protection, and gloves during this patient encounter. Patient also wearing a surgical mask. Hand hygeine performed before and after seeing the patient.      Electronically signed by Uzma Pan PA-C, 05/15/23, 3:03 AM EDT.

## 2023-05-15 NOTE — ANESTHESIA POSTPROCEDURE EVALUATION
"Patient: Chemo Lin    Procedure Summary     Date: 05/15/23 Room / Location: [facility] OR [address] [place] [address] MAIN OR    Anesthesia Start: 1128 Anesthesia Stop: 1225    Procedure: CHOLECYSTECTOMY LAPAROSCOPIC INTRAOPERATIVE CHOLANGIOGRAM (Abdomen) Diagnosis:     Surgeons: Kedar Reyes MD Provider: Angie Rodríguez MD    Anesthesia Type: general ASA Status: 3          Anesthesia Type: general    Vitals  Vitals Value Taken Time   /81 05/15/23 1415   Temp 36.4 °C (97.5 °F) 05/15/23 1200   Pulse 76 05/15/23 1424   Resp 12 05/15/23 1243   SpO2 95 % 05/15/23 1424   Vitals shown include unvalidated device data.        Post Anesthesia Care and Evaluation    Patient location during evaluation: bedside  Patient participation: complete - patient participated  Level of consciousness: awake and alert  Pain management: adequate    Airway patency: patent  Anesthetic complications: No anesthetic complications    Cardiovascular status: acceptable  Respiratory status: acceptable  Hydration status: acceptable    Comments: /78   Pulse 84   Temp 36.4 °C (97.5 °F) (Oral)   Resp 16   Ht 180.3 cm (71\")   Wt 93 kg (205 lb)   SpO2 94%   BMI 28.59 kg/m²         "

## 2023-05-15 NOTE — ED NOTES
.Nursing report ED to floor  Chemo Lin  63 y.o.  male    HPI :   Chief Complaint   Patient presents with    Abdominal Pain    Nausea    Vomiting       Admitting doctor:   Rashaad Guzman MD    Admitting diagnosis:   The primary encounter diagnosis was Gallbladder sludge. A diagnosis of RUQ pain was also pertinent to this visit.    Code status:   Current Code Status       Date Active Code Status Order ID Comments User Context       5/15/2023 0233 CPR (Attempt to Resuscitate) [id number]  Uzma Pan, [facility]        Question Answer    Code Status (Patient has no pulse and is not breathing) CPR (Attempt to Resuscitate)    Medical Interventions (Patient has pulse or is breathing) Full Support                    Allergies:   Patient has no known allergies.    Isolation:   No active isolations    Intake and Output  No intake or output data in the 24 hours ending 05/15/23 0313    Weight:       05/14/23  2335   Weight: 93 kg (205 lb)       Most recent vitals:   Vitals:    05/15/23 0016 05/15/23 0046 05/15/23 0116 05/15/23 0216   BP: 141/86 142/85 132/82 127/80   Pulse: 72 72 70 69   Resp:  16     Temp:       TempSrc:       SpO2: 97% 94% 97% 97%   Weight:       Height:           Active LDAs/IV Access:   Lines, Drains & Airways       Active LDAs       Name Placement date Placement time Site Days    Peripheral IV 05/14/23 2351 Right Antecubital 05/14/23 2351  Antecubital  less than 1                    Labs (abnormal labs have a star):   Labs Reviewed   COMPREHENSIVE METABOLIC PANEL - Abnormal; Notable for the following components:       Result Value    Glucose 225 (*)     BUN 27 (*)     ALT (SGPT) 45 (*)     All other components within normal limits    Narrative:     GFR Normal >60  Chronic Kidney Disease <60  Kidney Failure <15     URINALYSIS W/ MICROSCOPIC IF INDICATED (NO CULTURE) - Abnormal; Notable for the following components:    Glucose, UA >=1000 mg/dL (3+) (*)     All other components within normal  limits    Narrative:     Urine microscopic not indicated.   CBC WITH AUTO DIFFERENTIAL - Abnormal; Notable for the following components:    RDW 12.2 (*)     All other components within normal limits   LIPASE - Normal   LACTIC ACID, PLASMA - Normal   TROPONIN - Normal    Narrative:     High Sensitive Troponin T Reference Range:  <10.0 ng/L- Negative Female for AMI  <15.0 ng/L- Negative Male for AMI  >=10 - Abnormal Female indicating possible myocardial injury.  >=15 - Abnormal Male indicating possible myocardial injury.   Clinicians would have to utilize clinical acumen, EKG, Troponin, and serial changes to determine if it is an Acute Myocardial Infarction or myocardial injury due to an underlying chronic condition.        HIGH SENSITIVITIY TROPONIN T 2HR - Normal    Narrative:     High Sensitive Troponin T Reference Range:  <10.0 ng/L- Negative Female for AMI  <15.0 ng/L- Negative Male for AMI  >=10 - Abnormal Female indicating possible myocardial injury.  >=15 - Abnormal Male indicating possible myocardial injury.   Clinicians would have to utilize clinical acumen, EKG, Troponin, and serial changes to determine if it is an Acute Myocardial Infarction or myocardial injury due to an underlying chronic condition.        RAINBOW DRAW    Narrative:     The following orders were created for panel order [place] Draw.  Procedure                               Abnormality         Status                     ---------                               -----------         ------                     Green Top (Gel)[[id number]]                                  Final result               Lavender Top[[id number]]                                     Final result               Gold Top - SST[[id number]]                                   Final result               Light Blue Top[[id number]]                                   Final result                 Please view results for these tests on the individual orders.   CBC (NO DIFF)    COMPREHENSIVE METABOLIC PANEL   CBC AND DIFFERENTIAL    Narrative:     The following orders were created for panel order CBC & Differential.  Procedure                               Abnormality         Status                     ---------                               -----------         ------                     CBC Auto Differential[[id number]]        Abnormal            Final result                 Please view results for these tests on the individual orders.   GREEN TOP   LAVENDER TOP   GOLD TOP - SST   LIGHT BLUE TOP       EKG:   ECG 12 Lead Other; epigastric pain   Preliminary Result   HEART RATE= 72  bpm   RR Interval= 833  ms   IA Interval= 186  ms   P Horizontal Axis= 14  deg   P Front Axis= 48  deg   QRSD Interval= 109  ms   QT Interval= 410  ms   QRS Axis= -8  deg   T Wave Axis= -16  deg   - ABNORMAL ECG -   Sinus rhythm   RSR' in V1 or V2, right VCD or RVH   Left ventricular hypertrophy   Borderline T abnormalities, inferior leads   Electronically Signed By:    Date and Time of Study: 2023-05-15 00:26:55          Meds given in ED:   Medications   sodium chloride 0.9 % flush 10 mL (has no administration in time range)   sodium chloride 0.9 % flush 10 mL (has no administration in time range)   sodium chloride 0.9 % flush 10 mL (has no administration in time range)   sodium chloride 0.9 % infusion 40 mL (has no administration in time range)   ondansetron (ZOFRAN) tablet 4 mg (has no administration in time range)     Or   ondansetron (ZOFRAN) injection 4 mg (has no administration in time range)   nitroglycerin (NITROSTAT) SL tablet 0.4 mg (has no administration in time range)   lactated ringers infusion (has no administration in time range)   acetaminophen (TYLENOL) tablet 650 mg (has no administration in time range)   oxyCODONE-acetaminophen (PERCOCET) 5-325 MG per tablet 1 tablet (has no administration in time range)   morphine injection 2 mg (has no administration in time range)     And   naloxone  (NARCAN) injection 0.4 mg (has no administration in time range)   melatonin tablet 5 mg (has no administration in time range)   morphine injection 4 mg (4 mg Intravenous Given 5/15/23 0022)   ondansetron (ZOFRAN) injection 4 mg (4 mg Intravenous Given 5/15/23 0022)       Imaging results:  US Gallbladder    Result Date: 5/15/2023  Possible gallbladder sludge.  This report was finalized on 5/15/2023 1:10 AM by Dr. Ceci Desir M.D.       Ambulatory status:   - up ad sabina    Social issues:   Social History     Socioeconomic History    Marital status:    Tobacco Use    Smoking status: Never    Smokeless tobacco: Never   Vaping Use    Vaping Use: Never used   Substance and Sexual Activity    Alcohol use: No    Drug use: Never    Sexual activity: Yes     Partners: Female     Birth control/protection: Surgical       NIH Stroke Scale:         Enedina Mills RN  05/15/23 03:13 EDT

## 2023-05-15 NOTE — ED NOTES
Pt ambulatory to triage from home with c/o abdominal pain that started after eating fried chicken tonight.  History of gallbladder problems.

## 2023-05-15 NOTE — PROGRESS NOTES
ED OBSERVATION PROGRESS/DISCHARGE SUMMARY    Date of Admission: 5/14/2023   LOS: 0 days   PCP: Roly Barreto MD    Final Diagnosis Biliary colic      Subjective     Hospital Outcome:   Pleasant afebrile ambulatory 63-year-old  gentleman admitted to the ED observation unit for abdominal discomfort.  He was seen in the emergency department last night and underwent gallbladder ultrasound which showed possible gallbladder sludge.  This a.m. patient denies any abdominal discomfort, fever or chills.  He has been n.p.o.    Antibiotics were not started as low suspicion for cholecystitis in absence of leukocytosis.  Patient was seen and evaluated by Dr. Reyes with the general surgery team and deemed appropriate to undergo laparoscopic cholecystectomy today.  I discussed case with surgeon who states that they will take over after patient leaves department for operating room and anticipates the patient will be discharged home today.    ROS:  General: no fevers, chills  Respiratory: no cough, dyspnea  Cardiovascular: no chest pain, palpitations  Abdomen: No abdominal pain, nausea, vomiting, or diarrhea  Neurologic: No focal weakness    Objective   Physical Exam:  I have reviewed the vital signs.  Temp:  [96.8 °F (36 °C)-97.9 °F (36.6 °C)] 97.9 °F (36.6 °C)  Heart Rate:  [58-78] 58  Resp:  [16-18] 18  BP: (116-161)/(73-91) 116/84  General Appearance:    Alert, cooperative, no distress  Head:    Normocephalic, atraumatic  Eyes:    Sclerae anicteric  Neck:   Supple, no mass  Lungs: Clear to auscultation bilaterally, respirations unlabored  Heart: Regular rate and rhythm, S1 and S2 normal, no murmur, rub or gallop  Abdomen:  Soft, non-tender, bowel sounds active, nondistended  Extremities: No clubbing, cyanosis, or edema to lower extremities  Pulses:  2+ and symmetric in distal lower extremities  Skin: No rashes   Neurologic: Oriented x3, Normal strength to extremities    Results Review:    I have reviewed the labs,  radiology results and diagnostic studies.    Results from last 7 days   Lab Units 05/15/23  0431   WBC 10*3/mm3 5.51   HEMOGLOBIN g/dL 14.0   HEMATOCRIT % 41.6   PLATELETS 10*3/mm3 159     Results from last 7 days   Lab Units 05/15/23  0431 05/14/23  2356   SODIUM mmol/L 141 138   POTASSIUM mmol/L 3.2* 3.8   CHLORIDE mmol/L 105 101   CO2 mmol/L 26.3 26.3   BUN mg/dL 24* 27*   CREATININE mg/dL 0.98 1.27   CALCIUM mg/dL 8.7 9.0   BILIRUBIN mg/dL 0.4 0.5   ALK PHOS U/L 55 68   ALT (SGPT) U/L 39 45*   AST (SGOT) U/L 22 25   GLUCOSE mg/dL 127* 225*     Imaging Results (Last 24 Hours)     Procedure Component Value Units Date/Time    US Gallbladder [[id number]] Collected: 05/15/23 0107     Updated: 05/15/23 0114    Narrative:      GALLBLADDER ULTRASOUND     HISTORY: Right upper quadrant pain     COMPARISON: 04/29/2023     TECHNIQUE: Grayscale and color Doppler sonographic images were obtained  through the right upper quadrant     FINDINGS:  There is limited visualization of the pancreas. No suspicious hepatic  lesions are seen. There is no intra or extrahepatic biliary dilatation.  Common bile duct measures 4 mm. Initially, no stones were identified  within the gallbladder. However, on decubitus imaging, there is a  suggestion of some sludge within the gallbladder. There is no  gallbladder wall thickening or pericholecystic fluid. Gallbladder wall  measures 2 mm in thickness. Right kidney measures 10.6 x 5.4 x 4.7 cm.  No hydronephrosis is seen.       Impression:      Possible gallbladder sludge.     This report was finalized on 5/15/2023 1:10 AM by Dr. Ceci Desir M.D.             I have reviewed the medications.  ---------------------------------------------------------------------------------------------  Assessment & Plan   Assessment/Problem List    RUQ pain      Plan:  Right upper quadrant pain  -Gallbladder ultrasound shows possible biliary sludge  -T. bili 0.5, AST 25, ALT 45, alk phos 68  -General surgery  consulted, to operating room today  -Analgesics as needed  -IV fluid  -N.p.o.     Type 2 diabetes  -Hold home oral antidiabetic medications  -Sliding scale insulin and Accu-Cheks every 6 hours     Hypertension  -Continue close chlorthalidone, valsartan  -Monitor with vital signs every 4 hours     Hyperlipidemia  -Continue statin    Disposition: Transfer to general surgery/Dr. Reyes      This note will serve as a progress and transfer note    Katt Ma, APRN 05/15/23 10:51 EDT    I have worn appropriate PPE during this patient encounter, sanitized my hands both with entering and exiting patient's room.      51 minutes has been spent by [facility] providers in the care of this patient while under observation status

## 2023-05-15 NOTE — ANESTHESIA PREPROCEDURE EVALUATION
Anesthesia Evaluation     Patient summary reviewed and Nursing notes reviewed   NPO Solid Status: > 8 hours  NPO Liquid Status: > 4 hours           Airway   Mallampati: II  Neck ROM: full  No difficulty expected  Dental - normal exam     Pulmonary     breath sounds clear to auscultation  Cardiovascular     Rhythm: regular    (+) hypertension, CAD, cardiac stents hyperlipidemia,       Neuro/Psych  GI/Hepatic/Renal/Endo    (+)   diabetes mellitus,     Musculoskeletal     Abdominal    Substance History      OB/GYN          Other   arthritis,                      Anesthesia Plan    ASA 3     general     intravenous induction     Anesthetic plan, risks, benefits, and alternatives have been provided, discussed and informed consent has been obtained with: patient.        CODE STATUS:    Code Status (Patient has no pulse and is not breathing): CPR (Attempt to Resuscitate)  Medical Interventions (Patient has pulse or is breathing): Full Support

## 2023-05-15 NOTE — ED PROVIDER NOTES
EMERGENCY DEPARTMENT ENCOUNTER    Room Number:  121/1  Date of encounter:  5/15/2023  PCP: Roly Barreto MD  Patient Care Team:  Roly Barreto MD as PCP - General  Annalee Benson APRN (Family Medicine)  Cedric Gurrola MD as Consulting Physician (Cardiology)  Kar Newman MD as Surgeon (Orthopedic Surgery)  Kelly Quintero APRN as Nurse Practitioner (Endocrinology)  Yoly Hall APRN as Nurse Practitioner (Urology)   Independent Historians: Patient    HPI:  Chief Complaint: Right upper quadrant pain     A complete HPI/ROS/PMH/PSH/SH/FH are unobtainable due to: None    Chronic or social conditions impacting patient care (Social Determinants of Health): None  (Financial Resource Strain / Food Insecurity / Transportation Needs / Physical Activity / Stress / Social Connections / Intimate Partner Violence / Housing Stability)    Context: Chemo Lin is a 63 y.o. male who presents to the ED c/o acute onset right upper quadrant pain after eating fried chicken at a family gathering this evening.  Patient reports nausea and vomiting.  Reports that pain is in his right upper quadrant radiates to his epigastrium.  No shortness of breath no upper chest pain.    Review of prior external notes (non-ED) -and- Review of prior external test results outside of this encounter: I reviewed patient's cardiology visit in 4/17/2023    Prescription drug monitoring program review:         PAST MEDICAL HISTORY  Active Ambulatory Problems     Diagnosis Date Noted   • Hyperuricemia 02/15/2016   • Low testosterone 02/15/2016   • Essential hypertension 02/15/2016   • Vitamin D deficiency 02/15/2016   • Dyslipidemia 02/15/2016   • Diabetes mellitus type 2, controlled, without complications 02/15/2016   • ED (erectile dysfunction) 02/22/2019   • CAD (coronary artery disease) 05/05/2013   • History of coronary artery stent placement 10/17/2019   • Respiratory insufficiency 10/17/2019   • Family history of  premature coronary heart disease 10/17/2019   • Hand arthritis 02/27/2020   • History of stress test 04/15/2020   • History of echocardiogram 04/15/2020   • Complete tear of right rotator cuff 02/15/2022   • Subacromial impingement of right shoulder 02/15/2022   • Biceps tendinitis of right upper extremity 02/15/2022   • Status post right shoulder arthroscopic rotator cuff repair with use of [employer] bio inductive implant, open biceps tenodesis DOS 02/18/2022 02/25/2022     Resolved Ambulatory Problems     Diagnosis Date Noted   • No Resolved Ambulatory Problems     Past Medical History:   Diagnosis Date   • Allergic rhinitis    • Arthritis of back 11-15-21   • Coronary artery disease    • Hyperlipidemia    • Hypertension    • Rotator cuff syndrome 12-23-21   • Tendinitis    • Type 2 diabetes mellitus          PAST SURGICAL HISTORY  Past Surgical History:   Procedure Laterality Date   • CATARACT EXTRACTION, BILATERAL     • COLONOSCOPY     • CORONARY ANGIOPLASTY WITH STENT PLACEMENT      10 years ago   • FOOT SURGERY Right     great toe joint repair   • SHOULDER ARTHROSCOPY W/ ROTATOR CUFF REPAIR Right 02/18/2022    Procedure: SHOULDER ARTHROSCOPY WITH ROTATOR CUFF REPAIR COMPLEX, OPEN BICEPS TENODESIS;  Surgeon: Kar Newman MD;  Location: [facility];  Service: Orthopedics;  Laterality: Right;         FAMILY HISTORY  Family History   Problem Relation Age of Onset   • Hypertension Mother    • Heart disease Father    • Hypertension Father    • Lung cancer Father    • Cancer Father    • Diabetes Father    • Diabetes Paternal Grandmother    • Diabetes Maternal Grandmother    • Malig Hyperthermia Neg Hx          SOCIAL HISTORY  Social History     Socioeconomic History   • Marital status:    Tobacco Use   • Smoking status: Never   • Smokeless tobacco: Never   Vaping Use   • Vaping Use: Never used   Substance and Sexual Activity   • Alcohol use: No   • Drug use: Never   • Sexual activity: Yes     Partners:  Female     Birth control/protection: Surgical         ALLERGIES  Patient has no known allergies.        REVIEW OF SYSTEMS  Review of Systems  Included in HPI  All systems reviewed and negative except for those discussed in HPI.      PHYSICAL EXAM    I have reviewed the triage vital signs and nursing notes.    ED Triage Vitals [05/14/23 2335]   Temp Heart Rate Resp BP SpO2   96.8 °F (36 °C) 78 18 161/91 98 %      Temp src Heart Rate Source Patient Position BP Location FiO2 (%)   Tympanic Monitor Standing Right arm --       Physical Exam  GENERAL: alert, no acute distress  SKIN: Warm, dry  HENT: Normocephalic, atraumatic  EYES: no scleral icterus  CV: regular rhythm, regular rate  RESPIRATORY: normal effort, lungs clear  ABDOMEN: soft, right upper quadrant is tender to palpation there is guarding but no rebound, nondistended  MUSCULOSKELETAL: no deformity  NEURO: alert, moves all extremities, follows commands                                                               LAB RESULTS  Recent Results (from the past 24 hour(s))   Comprehensive Metabolic Panel    Collection Time: 05/14/23 11:56 PM    Specimen: Blood   Result Value Ref Range    Glucose 225 (H) 65 - 99 mg/dL    BUN 27 (H) 8 - 23 mg/dL    Creatinine 1.27 0.76 - 1.27 mg/dL    Sodium 138 136 - 145 mmol/L    Potassium 3.8 3.5 - 5.2 mmol/L    Chloride 101 98 - 107 mmol/L    CO2 26.3 22.0 - 29.0 mmol/L    Calcium 9.0 8.6 - 10.5 mg/dL    Total Protein 6.3 6.0 - 8.5 g/dL    Albumin 4.2 3.5 - 5.2 g/dL    ALT (SGPT) 45 (H) 1 - 41 U/L    AST (SGOT) 25 1 - 40 U/L    Alkaline Phosphatase 68 39 - 117 U/L    Total Bilirubin 0.5 0.0 - 1.2 mg/dL    Globulin 2.1 gm/dL    A/G Ratio 2.0 g/dL    BUN/Creatinine Ratio 21.3 7.0 - 25.0    Anion Gap 10.7 5.0 - 15.0 mmol/L    eGFR 63.5 >60.0 mL/min/1.73   Lipase    Collection Time: 05/14/23 11:56 PM    Specimen: Blood   Result Value Ref Range    Lipase 33 13 - 60 U/L   Lactic Acid, Plasma    Collection Time: 05/14/23 11:56 PM     Specimen: Blood   Result Value Ref Range    Lactate 1.2 0.5 - 2.0 mmol/L   Green Top (Gel)    Collection Time: 05/14/23 11:56 PM   Result Value Ref Range    Extra Tube Hold for add-ons.    Lavender Top    Collection Time: 05/14/23 11:56 PM   Result Value Ref Range    Extra Tube hold for add-on    Gold Top - SST    Collection Time: 05/14/23 11:56 PM   Result Value Ref Range    Extra Tube Hold for add-ons.    Light Blue Top    Collection Time: 05/14/23 11:56 PM   Result Value Ref Range    Extra Tube Hold for add-ons.    CBC Auto Differential    Collection Time: 05/14/23 11:56 PM    Specimen: Blood   Result Value Ref Range    WBC 5.87 3.40 - 10.80 10*3/mm3    RBC 4.87 4.14 - 5.80 10*6/mm3    Hemoglobin 14.7 13.0 - 17.7 g/dL    Hematocrit 43.9 37.5 - 51.0 %    MCV 90.1 79.0 - 97.0 fL    MCH 30.2 26.6 - 33.0 pg    MCHC 33.5 31.5 - 35.7 g/dL    RDW 12.2 (L) 12.3 - 15.4 %    RDW-SD 40.2 37.0 - 54.0 fl    MPV 10.1 6.0 - 12.0 fL    Platelets 184 140 - 450 10*3/mm3    Neutrophil % 53.0 42.7 - 76.0 %    Lymphocyte % 28.3 19.6 - 45.3 %    Monocyte % 11.6 5.0 - 12.0 %    Eosinophil % 6.1 0.3 - 6.2 %    Basophil % 0.7 0.0 - 1.5 %    Immature Grans % 0.3 0.0 - 0.5 %    Neutrophils, Absolute 3.11 1.70 - 7.00 10*3/mm3    Lymphocytes, Absolute 1.66 0.70 - 3.10 10*3/mm3    Monocytes, Absolute 0.68 0.10 - 0.90 10*3/mm3    Eosinophils, Absolute 0.36 0.00 - 0.40 10*3/mm3    Basophils, Absolute 0.04 0.00 - 0.20 10*3/mm3    Immature Grans, Absolute 0.02 0.00 - 0.05 10*3/mm3    nRBC 0.0 0.0 - 0.2 /100 WBC   High Sensitivity Troponin T    Collection Time: 05/14/23 11:56 PM    Specimen: Blood   Result Value Ref Range    HS Troponin T 12 <15 ng/L   Urinalysis With Microscopic If Indicated (No Culture) - Urine, Clean Catch    Collection Time: 05/15/23 12:02 AM    Specimen: Urine, Clean Catch   Result Value Ref Range    Color, UA Yellow Yellow, Straw    Appearance, UA Clear Clear    pH, UA <=5.0 5.0 - 8.0    Specific Gravity, UA >=1.030 1.005 -  1.030    Glucose, UA >=1000 mg/dL (3+) (A) Negative    Ketones, UA Negative Negative    Bilirubin, UA Negative Negative    Blood, UA Negative Negative    Protein, UA Negative Negative    Leuk Esterase, UA Negative Negative    Nitrite, UA Negative Negative    Urobilinogen, UA 0.2 E.U./dL 0.2 - 1.0 E.U./dL   ECG 12 Lead Other; epigastric pain    Collection Time: 05/15/23 12:26 AM   Result Value Ref Range    QT Interval 410 ms   High Sensitivity Troponin T 2Hr    Collection Time: 05/15/23  1:57 AM    Specimen: Blood   Result Value Ref Range    HS Troponin T 13 <15 ng/L    Troponin T Delta 1 >=-4 - <+4 ng/L   CBC (No Diff)    Collection Time: 05/15/23  4:31 AM    Specimen: Arm, Right; Blood   Result Value Ref Range    WBC 5.51 3.40 - 10.80 10*3/mm3    RBC 4.67 4.14 - 5.80 10*6/mm3    Hemoglobin 14.0 13.0 - 17.7 g/dL    Hematocrit 41.6 37.5 - 51.0 %    MCV 89.1 79.0 - 97.0 fL    MCH 30.0 26.6 - 33.0 pg    MCHC 33.7 31.5 - 35.7 g/dL    RDW 12.5 12.3 - 15.4 %    RDW-SD 40.9 37.0 - 54.0 fl    MPV 10.6 6.0 - 12.0 fL    Platelets 159 140 - 450 10*3/mm3   Comprehensive Metabolic Panel    Collection Time: 05/15/23  4:31 AM    Specimen: Arm, Right; Blood   Result Value Ref Range    Glucose 127 (H) 65 - 99 mg/dL    BUN 24 (H) 8 - 23 mg/dL    Creatinine 0.98 0.76 - 1.27 mg/dL    Sodium 141 136 - 145 mmol/L    Potassium 3.2 (L) 3.5 - 5.2 mmol/L    Chloride 105 98 - 107 mmol/L    CO2 26.3 22.0 - 29.0 mmol/L    Calcium 8.7 8.6 - 10.5 mg/dL    Total Protein 5.7 (L) 6.0 - 8.5 g/dL    Albumin 3.9 3.5 - 5.2 g/dL    ALT (SGPT) 39 1 - 41 U/L    AST (SGOT) 22 1 - 40 U/L    Alkaline Phosphatase 55 39 - 117 U/L    Total Bilirubin 0.4 0.0 - 1.2 mg/dL    Globulin 1.8 gm/dL    A/G Ratio 2.2 g/dL    BUN/Creatinine Ratio 24.5 7.0 - 25.0    Anion Gap 9.7 5.0 - 15.0 mmol/L    eGFR 86.6 >60.0 mL/min/1.73       Ordered the above labs and independently reviewed the results.        RADIOLOGY  US Gallbladder    Result Date: 5/15/2023  GALLBLADDER  ULTRASOUND  HISTORY: Right upper quadrant pain  COMPARISON: 04/29/2023  TECHNIQUE: Grayscale and color Doppler sonographic images were obtained through the right upper quadrant  FINDINGS: There is limited visualization of the pancreas. No suspicious hepatic lesions are seen. There is no intra or extrahepatic biliary dilatation. Common bile duct measures 4 mm. Initially, no stones were identified within the gallbladder. However, on decubitus imaging, there is a suggestion of some sludge within the gallbladder. There is no gallbladder wall thickening or pericholecystic fluid. Gallbladder wall measures 2 mm in thickness. Right kidney measures 10.6 x 5.4 x 4.7 cm. No hydronephrosis is seen.      Possible gallbladder sludge.  This report was finalized on 5/15/2023 1:10 AM by Dr. Ceci Desir M.D.        I ordered the above noted radiological studies. Reviewed by me and discussed with radiologist.  See dictation for official radiology interpretation.      PROCEDURES    Procedures      MEDICATIONS GIVEN IN ER    Medications   sodium chloride 0.9 % flush 10 mL (has no administration in time range)   sodium chloride 0.9 % flush 10 mL (10 mL Intravenous Given 5/15/23 0346)   sodium chloride 0.9 % flush 10 mL (has no administration in time range)   sodium chloride 0.9 % infusion 40 mL (has no administration in time range)   ondansetron (ZOFRAN) tablet 4 mg (has no administration in time range)     Or   ondansetron (ZOFRAN) injection 4 mg (has no administration in time range)   nitroglycerin (NITROSTAT) SL tablet 0.4 mg (has no administration in time range)   lactated ringers infusion (100 mL/hr Intravenous Currently Infusing 5/15/23 0642)   acetaminophen (TYLENOL) tablet 650 mg (has no administration in time range)   oxyCODONE-acetaminophen (PERCOCET) 5-325 MG per tablet 1 tablet (has no administration in time range)   morphine injection 2 mg (has no administration in time range)     And   naloxone (NARCAN) injection 0.4  mg (has no administration in time range)   melatonin tablet 5 mg (has no administration in time range)   potassium chloride (K-DUR,KLOR-CON) ER tablet 40 mEq (has no administration in time range)   morphine injection 4 mg (4 mg Intravenous Given 5/15/23 0022)   ondansetron (ZOFRAN) injection 4 mg (4 mg Intravenous Given 5/15/23 0022)         ORDERS PLACED DURING THIS VISIT:  Orders Placed This Encounter   Procedures   • US Gallbladder   • [place] Draw   • Comprehensive Metabolic Panel   • Lipase   • Urinalysis With Microscopic If Indicated (No Culture) - Urine, Clean Catch   • Lactic Acid, Plasma   • CBC Auto Differential   • High Sensitivity Troponin T   • High Sensitivity Troponin T 2Hr   • CBC (No Diff)   • Potassium   • Magnesium   • High Sensitivity Troponin T   • Blood Gas, Arterial -   • Comprehensive Metabolic Panel   • NPO Diet NPO Type: Sips with Meds   • Undress & Gown   • Intake & Output   • Weigh Patient   • Oral Care   • Telemetry - Maintain IV Access   • Continuous Cardiac Monitoring   • May Be Off Telemetry for Tests   • Vital Signs   • Pulse Oximetry, Continuous   • Up With Assistance   • Place Sequential Compression Device   • Maintain Sequential Compression Device   • Obtain Informed Consent   • Code Status and Medical Interventions:   • Surgery (on-call MD unless specified)   • Inpatient General Surgery Consult   • Oxygen Therapy- Nasal Cannula; Titrate for SPO2: 90% - 95%   • Oxygen Therapy- Nasal Cannula; Titrate for SPO2: 90% - 95%   • ECG 12 Lead Other; epigastric pain   • ECG 12 Lead Chest Pain   • SCANNED - TELEMETRY     • Insert Peripheral IV   • Insert Peripheral IV   • Initiate ED Observation Status   • CBC & Differential   • Green Top (Gel)   • Lavender Top   • Gold Top - SST   • Light Blue Top         PROGRESS, DATA ANALYSIS, CONSULTS, AND MEDICAL DECISION MAKING    All labs have been independently interpreted by me.  All radiology studies have been reviewed by me and discussed with  radiologist dictating the report.   EKG's independently viewed and interpreted by me.  Discussion below represents my analysis of pertinent findings related to patient's condition, differential diagnosis, treatment plan and final disposition.    Differential diagnosis includes but is not limited to biliary colic, cholecystitis, ACS.    ED Course as of 05/15/23 0707   Mon May 15, 2023   0031 EKG          EKG time: 0026  Rhythm/Rate: Sinus rhythm rate 70  P waves and CT: Normal  QRS, axis: LVH  ST and T waves: Nonspecific    Interpreted Contemporaneously by me, independently viewed [TJ]   0231 I have discussed the patient with Dr. Reyes.  He states that we can admit the patient to observation and he will see him later this morning. [TJ]   0231 Discussed with Her son with observation unit.  Happy admit the patient. [TJ]      ED Course User Index  [TJ] Maciel Lin MD       I interpreted the cardiac monitor rhythm and my independent interpretation is: normal sinus rhythm.     PPE: The patient wore a mask and I wore an N95 mask throughout the entire patient encounter.       AS OF 07:07 EDT VITALS:    BP - 116/73  HR - 63  TEMP - 97.7 °F (36.5 °C) (Oral)  O2 SATS - 93%        DIAGNOSIS  Final diagnoses:   Gallbladder sludge   RUQ pain         DISPOSITION  ED Disposition     ED Disposition   Decision to Admit    Condition   --    Comment   --                Note Disclaimer: At [facility], we believe that sharing information builds trust and better relationships. You are receiving this note because you recently visited [facility]. It is possible you will see health information before a provider has talked with you about it. This kind of information can be easy to misunderstand. To help you fully understand what it means for your health, we urge you to discuss this note with your provider.       Maciel Lin MD  05/15/23 0707

## 2023-05-15 NOTE — ANESTHESIA PROCEDURE NOTES
Airway  Urgency: elective    Date/Time: 5/15/2023 11:36 AM  Airway not difficult    General Information and Staff    Patient location during procedure: OR  CRNA/CAA: Mariana Schmidt CRNA    Indications and Patient Condition  Indications for airway management: airway protection    Preoxygenated: yes  Mask difficulty assessment: 1 - vent by mask    Final Airway Details  Final airway type: endotracheal airway      Successful airway: ETT  Cuffed: yes   Successful intubation technique: direct laryngoscopy  Facilitating devices/methods: intubating stylet  Endotracheal tube insertion site: oral  Blade: Brant  Blade size: 4  ETT size (mm): 7.5  Cormack-Lehane Classification: grade I - full view of glottis  Placement verified by: chest auscultation and capnometry   Cuff volume (mL): 8  Measured from: lips  Number of attempts at approach: 1  Assessment: lips, teeth, and gum same as pre-op and atraumatic intubation

## 2023-05-16 ENCOUNTER — TRANSITIONAL CARE MANAGEMENT TELEPHONE ENCOUNTER (OUTPATIENT)
Dept: CALL CENTER | Facility: HOSPITAL | Age: 63
End: 2023-05-16
Payer: COMMERCIAL

## 2023-05-16 ENCOUNTER — TELEPHONE (OUTPATIENT)
Dept: SURGERY | Facility: CLINIC | Age: 63
End: 2023-05-16
Payer: COMMERCIAL

## 2023-05-16 LAB
LAB AP CASE REPORT: NORMAL
PATH REPORT.FINAL DX SPEC: NORMAL
PATH REPORT.GROSS SPEC: NORMAL

## 2023-05-16 NOTE — OUTREACH NOTE
Call Center TCM Note    Flowsheet Row Responses   [facility], [place], operated by [facility] patient discharged from? [place]   Does the patient have one of the following disease processes/diagnoses(primary or secondary)? General Surgery   TCM attempt successful? Yes   Call start time 1445   Call end time 1448   Discharge diagnosis emergent danisha   Meds reviewed with patient/caregiver? Yes   Is the patient having any side effects they believe may be caused by any medication additions or changes? No   Does the patient have all medications related to this admission filled (includes all antibiotics, pain medications, etc.) Yes   Is the patient taking all medications as directed (includes completed medication regime)? Yes   Does the patient have an appointment with their PCP within 7 days of discharge? No   Nursing Interventions Routed TCM call to PCP office   Has home health visited the patient within 72 hours of discharge? N/A   Psychosocial issues? No   Did the patient receive a copy of their discharge instructions? Yes   Nursing interventions Reviewed instructions with patient   What is the patient's perception of their health status since discharge? Improving   Is the patient /caregiver able to teach back basic post-op care? Continue use of incentive spirometry at least 1 week post discharge, Take showers only when approved by MD-sponge bathe until then, Do not remove steri-strips, Lifting as instructed by MD in discharge instructions, No tub bath, swimming, or hot tub until instructed by MD, Practice 'cough and deep breath', Drive as instructed by MD in discharge instructions, Keep incision areas clean,dry and protected   Is the patient/caregiver able to teach back signs and symptoms of incisional infection? Increased redness, swelling or pain at the incisonal site, Pus or odor from incision, Fever, Increased drainage or bleeding, Incisional warmth   Is the patient/caregiver able to teach back steps to recovery at home? Set small,  achievable goals for return to baseline health, Practice good oral hygiene, Eat a well-balance diet, Rest and rebuild strength, gradually increase activity, Weigh daily, Make a list of questions for surgeon's appointment   If the patient is a current smoker, are they able to teach back resources for cessation? Not a smoker   Is the patient/caregiver able to teach back the hierarchy of who to call/visit for symptoms/problems? PCP, Specialist, Home health nurse, Urgent Care, ED, 911 Yes   TCM call completed? Yes   Wrap up additional comments D/C DX: emeergent cholecystectomy - Pt feeling well, minimal post op pain. Eating and drinking w/o issue. New rx, s in place prn. First POST OP is 05/23/2023. Pt just saw PCP Dr Barreto 05/01 and was told to follow up as needed before end of year.   Call end time [id number]          Noy Cedillo MA    5/16/2023, 14:51 EDT

## 2023-05-16 NOTE — DISCHARGE SUMMARY
DATE OF ADMIT:    • 5/14/2023    DATE OF DISCHARGE:    • 5/15/2023    DIAGNOSIS:    • Cholecystitis    FINAL PATHOLOGY:    • Chronic acalculous cholecystitis    PROCEDURES:    • Laparoscopic cholecystectomy with cholangiogram 5/15/2023    SUMMARY OF HOSPITAL COURSE:     Admitted from emergency room with typical symptoms of biliary colic.  Underwent surgery.  Gallbladder was markedly edematous at time of surgery.  Uneventful postop course. Tolerating diet, incisions in good order and afebrile with stable vital signs at discharge. Prescribed hydrocodone for pain.    DIET: Regular    ACTIVITY: Walking encouraged, no lifting or strenuous activity    MEDICATIONS: Refer to MAR    FOLLOW-UP: To call office and schedule 1 week follow-up appointment    Kedar Reyes M.D.

## 2023-05-16 NOTE — TELEPHONE ENCOUNTER
PT HAD GB SX THROUGH THE ER BY DR HARGROVE. CALLED THE OFFICE TO SCHEDULE THE POST-OP. ADDED HIM TO DR HARGROVE'S SCHEDULED ON 5/25 @ 10:15 AM.

## 2023-05-25 ENCOUNTER — OFFICE VISIT (OUTPATIENT)
Dept: SURGERY | Facility: CLINIC | Age: 63
End: 2023-05-25
Payer: COMMERCIAL

## 2023-05-25 VITALS — HEIGHT: 71 IN | BODY MASS INDEX: 28.7 KG/M2 | WEIGHT: 205 LBS

## 2023-05-25 DIAGNOSIS — Z12.11 SCREEN FOR COLON CANCER: ICD-10-CM

## 2023-05-25 DIAGNOSIS — Z48.89 POSTOPERATIVE VISIT: Primary | ICD-10-CM

## 2023-05-25 PROCEDURE — 99024 POSTOP FOLLOW-UP VISIT: CPT | Performed by: SURGERY

## 2023-05-25 NOTE — PROGRESS NOTES
Postoperative visit    Laparoscopic cholecystectomy with cholangiogram 5/15/2023    Gross surgical findings: Cholecystitis with marked gallbladder wall edema and small cholesterol stone  Pathology: Chronic acalculous cholecystitis  Cholangiogram: Normal    Office visit:  · Incisions are healing well and is doing well, reporting no problems from the surgery.  Activity restrictions discussed.  · He is due screening colonoscopy and has scheduled accordingly

## 2023-06-06 LAB — QT INTERVAL: 410 MS

## 2023-06-08 NOTE — PROGRESS NOTES
Chief Complaint: Erectile Dysfunction    Subjective         History of Present Illness  Chemo Lin is a 63 y.o. male presents to [facility] to be seen for follow-up.    Patient was previously seen by me with last visit on 2/14/2023 for erectile dysfunction.  We did start him on Cialis at that visit.  He is here for follow-up. He reports that he had better results with sildenafil and would like to go back to this. He is not interested in injections, yet.     Previous 2/14/2023:  Patient presents reporting he has had trouble with ED for several years. He has used sildenafil - reports this will work at times, but other times does not work and he does have a headache the next morning after using. Has not tried any other medications.      Denies Dysuria   Denies Frequency   Nocturia x 1 at times   Denies Urgency   Denies incontinence    Family hx of  malignancy - brother with prostate CA at age 60       surgeries -denies      Previous medications tried- sildenafil     PSA     12/17/2022 0.6  8/6/2020 0.626  1/15/2018 0.584  2/6/2016 0.9    Objective     Past Medical History:   Diagnosis Date    Allergic rhinitis     Arthritis of back 11-15-21    Back xray from [facility]    Coronary artery disease     History of echocardiogram 04/15/2020    11/26/2019-Calculated EF = 63% Mild mitral valve regurgitation is present Mild tricuspid valve regurgitation is present.       History of stress test 04/15/2020    11/26/2019-Diaphragmatic attenuation artifact is present. Left ventricular ejection fraction is normal (Calculated EF = 64%). Myocardial perfusion imaging indicates a normal myocardial perfusion study with no evidence of ischemia. Impressions are consistent with a low risk study. Findings consistent with an equivocal ECG stress test    Hyperlipidemia     Hypertension     type 2 diabetes mellitus, uncontrolled    Rotator cuff syndrome 12-23-21    MRI from [facility]    Tendinitis     Type 2  diabetes mellitus        Past Surgical History:   Procedure Laterality Date    CATARACT EXTRACTION, BILATERAL      CHOLECYSTECTOMY WITH INTRAOPERATIVE CHOLANGIOGRAM N/A 5/15/2023    Procedure: CHOLECYSTECTOMY LAPAROSCOPIC INTRAOPERATIVE CHOLANGIOGRAM;  Surgeon: Kedar Reyes MD;  Location: [facility] OR;  Service: General;  Laterality: N/A;    COLONOSCOPY      CORONARY ANGIOPLASTY WITH STENT PLACEMENT      10 years ago    FOOT SURGERY Right     great toe joint repair    SHOULDER ARTHROSCOPY W/ ROTATOR CUFF REPAIR Right 02/18/2022    Procedure: SHOULDER ARTHROSCOPY WITH ROTATOR CUFF REPAIR COMPLEX, OPEN BICEPS TENODESIS;  Surgeon: Kar Newman MD;  Location: [facility] OR;  Service: Orthopedics;  Laterality: Right;         Current Outpatient Medications:     aspirin 81 MG tablet, Take 1 tablet by mouth Every Night., Disp: , Rfl:     BD PEN NEEDLE SAVANA U/F 32G X 4 MM misc, USE AS DIRECTED ONCE DAILY FOR INSULIN INJECTIONS, Disp: 100 each, Rfl: 0    cetirizine (zyrTEC) 5 MG tablet, Take 1 tablet by mouth 2 (Two) Times a Day. Once in AM and once in PM, Disp: , Rfl:     chlorthalidone (HYGROTON) 25 MG tablet, Take 1 tablet by mouth daily., Disp: 90 tablet, Rfl: 0    empagliflozin (Jardiance) 25 MG tablet tablet, Take 1 tablet by mouth Daily for 90 days., Disp: 90 tablet, Rfl: 1    ezetimibe (ZETIA) 10 MG tablet, Take 1 tablet by mouth Daily., Disp: 90 tablet, Rfl: 3    fluticasone (FLONASE) 50 MCG/ACT nasal spray, 1 spray into the nostril(s) as directed by provider As Needed., Disp: , Rfl:     glipizide (GLUCOTROL XL) 10 MG 24 hr tablet, Take 1 tablet by mouth Daily., Disp: 90 tablet, Rfl: 1    metFORMIN ER (GLUCOPHAGE-XR) 500 MG 24 hr tablet, Take 2 tablets by mouth Daily With Breakfast & Dinner for 90 days., Disp: 360 tablet, Rfl: 1    naloxone (NARCAN) 4 MG/0.1ML nasal spray, Call 911. Don't prime. [place] in 1 nostril for overdose. Repeat in 2-3 minutes in other nostril if no or minimal  "breathing/responsiveness., Disp: 2 each, Rfl: 0    ONE TOUCH ULTRA TEST test strip, , Disp: , Rfl:     ONETOUCH DELICA LANCETS 33G misc, USE  PIECE TO CHECK GLUCOSE TWICE DAILY, Disp: 200 each, Rfl: 0    rosuvastatin (CRESTOR) 40 MG tablet, Take 1 tablet by mouth Daily. (Patient taking differently: Take 1 tablet by mouth Every Morning.), Disp: 90 tablet, Rfl: 3    Tresiba FlexTouch 100 UNIT/ML solution pen-injector injection, Inject 51 Units under the skin into the appropriate area as directed Daily. (Patient taking differently: Inject 51 Units under the skin into the appropriate area as directed Every Morning.), Disp: 15 mL, Rfl: 5    valsartan (DIOVAN) 40 MG tablet, TAKE 1 TABLET BY MOUTH EVERY DAY, Disp: , Rfl:     sildenafil (REVATIO) 20 MG tablet, Take 1-5 tablets 1 hour prior to intercourse as needed, Disp: 30 tablet, Rfl: 1    No Known Allergies     Family History   Problem Relation Age of Onset    Hypertension Mother     Heart disease Father     Hypertension Father     Lung cancer Father     Cancer Father     Diabetes Father     Diabetes Paternal Grandmother     Diabetes Maternal Grandmother     Malig Hyperthermia Neg Hx        Social History     Socioeconomic History    Marital status:    Tobacco Use    Smoking status: Never     Passive exposure: Never    Smokeless tobacco: Never   Vaping Use    Vaping Use: Never used   Substance and Sexual Activity    Alcohol use: No    Drug use: Never    Sexual activity: Yes     Partners: Female     Birth control/protection: Surgical       Vital Signs:   Resp 18   Ht 180.3 cm (71\")   Wt 93 kg (205 lb)   BMI 28.59 kg/m²      Physical Exam  Vitals reviewed.   Constitutional:       Appearance: Normal appearance.   Neurological:      General: No focal deficit present.      Mental Status: He is alert and oriented to person, place, and time.   Psychiatric:         Mood and Affect: Mood normal.         Behavior: Behavior normal.        Result Review :   The following " data was reviewed by: VESNA Moses on 06/09/2023:     PSA          12/17/2022    08:58   PSA   PSA 0.6          Procedures        Assessment and Plan    Diagnoses and all orders for this visit:    1. Erectile dysfunction, unspecified erectile dysfunction type (Primary)  -     sildenafil (REVATIO) 20 MG tablet; Take 1-5 tablets 1 hour prior to intercourse as needed  Dispense: 30 tablet; Refill: 1    Given that he had better results with sildenafil, he would like to go back to this.  We did discuss that if he has an erection that lasts more than 4 hours he should go to the emergency department.  We also discussed that if he does have to go on any kind of nitrates or nitroglycerin that he cannot use sildenafil with this.  We did discuss Trimix.  He has stated that if prior to his follow-up visit he decides to go with Trimix he will call us ahead of time so that we can order a sample vial for teaching.    He will follow-up in 3 months or sooner for new concerns.    Follow Up   Return in about 3 months (around 9/9/2023).  Patient was given instructions and counseling regarding his condition or for health maintenance advice. Please see specific information pulled into the AVS if appropriate.         This document has been electronically signed by VESNA Moses  June 9, 2023 09:08 EDT

## 2023-06-09 ENCOUNTER — OFFICE VISIT (OUTPATIENT)
Dept: UROLOGY | Facility: CLINIC | Age: 63
End: 2023-06-09
Payer: COMMERCIAL

## 2023-06-09 VITALS — RESPIRATION RATE: 18 BRPM | WEIGHT: 205 LBS | BODY MASS INDEX: 28.7 KG/M2 | HEIGHT: 71 IN

## 2023-06-09 DIAGNOSIS — N52.9 ERECTILE DYSFUNCTION, UNSPECIFIED ERECTILE DYSFUNCTION TYPE: Primary | ICD-10-CM

## 2023-06-09 RX ORDER — SILDENAFIL CITRATE 20 MG/1
TABLET ORAL
Qty: 30 TABLET | Refills: 1 | Status: SHIPPED | OUTPATIENT
Start: 2023-06-09

## 2023-07-25 NOTE — PROGRESS NOTES
"Subjective   Chemo Lin is a 63 y.o. male.     CC: Cough    History of Present Illness     Pt comes in today reporting issues with coughing, congestion for about a week. No f/c.  Having some sinus pressure, too, and using a nasal wash and Flonase.       The following portions of the patient's history were reviewed and updated as appropriate: allergies, current medications, past family history, past medical history, past social history, past surgical history, and problem list.    Review of Systems   Constitutional:  Negative for activity change, chills and fever.   HENT:  Positive for congestion and sinus pressure.    Respiratory:  Positive for cough.    Cardiovascular:  Negative for chest pain.   Psychiatric/Behavioral:  Negative for dysphoric mood.      /69   Pulse 84   Temp 98.3 °F (36.8 °C) (Oral)   Resp 16   Ht 180.3 cm (71\")   Wt 92.5 kg (204 lb)   SpO2 95%   BMI 28.45 kg/m²     Objective   Physical Exam  Constitutional:       General: He is not in acute distress.     Appearance: He is well-developed.   HENT:      Right Ear: Tympanic membrane normal.      Left Ear: Tympanic membrane and ear canal normal.   Cardiovascular:      Rate and Rhythm: Normal rate and regular rhythm.   Pulmonary:      Effort: Pulmonary effort is normal.      Breath sounds: Normal breath sounds.   Neurological:      Mental Status: He is alert and oriented to person, place, and time.   Psychiatric:         Behavior: Behavior normal.         Thought Content: Thought content normal.       Assessment & Plan   Diagnoses and all orders for this visit:    1. Acute non-recurrent sinusitis of other sinus (Primary)  -     amoxicillin-clavulanate (AUGMENTIN) 875-125 MG per tablet; Take 1 tablet by mouth Every 12 (Twelve) Hours.  Dispense: 20 tablet; Refill: 0  -     promethazine-dextromethorphan (PROMETHAZINE-DM) 6.25-15 MG/5ML syrup; Take 1-2 tsp (5-10ml) qhs prn cough  Dispense: 100 mL; Refill: 0    2. Bronchitis  -     " amoxicillin-clavulanate (AUGMENTIN) 875-125 MG per tablet; Take 1 tablet by mouth Every 12 (Twelve) Hours.  Dispense: 20 tablet; Refill: 0  -     promethazine-dextromethorphan (PROMETHAZINE-DM) 6.25-15 MG/5ML syrup; Take 1-2 tsp (5-10ml) qhs prn cough  Dispense: 100 mL; Refill: 0    3. Dyslipidemia  -     Lipid Panel

## 2023-07-26 ENCOUNTER — OFFICE VISIT (OUTPATIENT)
Dept: FAMILY MEDICINE CLINIC | Facility: CLINIC | Age: 63
End: 2023-07-26
Payer: COMMERCIAL

## 2023-07-26 VITALS
DIASTOLIC BLOOD PRESSURE: 69 MMHG | TEMPERATURE: 98.3 F | WEIGHT: 204 LBS | RESPIRATION RATE: 16 BRPM | HEART RATE: 84 BPM | OXYGEN SATURATION: 95 % | HEIGHT: 71 IN | BODY MASS INDEX: 28.56 KG/M2 | SYSTOLIC BLOOD PRESSURE: 112 MMHG

## 2023-07-26 DIAGNOSIS — J01.80 ACUTE NON-RECURRENT SINUSITIS OF OTHER SINUS: Primary | ICD-10-CM

## 2023-07-26 DIAGNOSIS — J40 BRONCHITIS: ICD-10-CM

## 2023-07-26 DIAGNOSIS — E78.5 DYSLIPIDEMIA: Chronic | ICD-10-CM

## 2023-07-26 RX ORDER — AMOXICILLIN AND CLAVULANATE POTASSIUM 875; 125 MG/1; MG/1
1 TABLET, FILM COATED ORAL EVERY 12 HOURS SCHEDULED
Qty: 20 TABLET | Refills: 0 | Status: SHIPPED | OUTPATIENT
Start: 2023-07-26

## 2023-07-26 RX ORDER — DEXTROMETHORPHAN HYDROBROMIDE AND PROMETHAZINE HYDROCHLORIDE 15; 6.25 MG/5ML; MG/5ML
SYRUP ORAL
Qty: 100 ML | Refills: 0 | Status: SHIPPED | OUTPATIENT
Start: 2023-07-26

## 2023-08-21 NOTE — PROGRESS NOTES
"Subjective   Chemo Lin is a 63 y.o. male.     CC: Skin Lesion    History of Present Illness     Patient comes in today reporting a somewhat tender \"knot\" on his right thigh.  Patient reports no remembered injury but does report that about a week or so ago he got a swelling that was somewhat tender in the middle of the right thigh.  This has since gone down quite a bit and is not tender at this point.  No other symptoms reported.      The following portions of the patient's history were reviewed and updated as appropriate: allergies, current medications, past family history, past medical history, past social history, past surgical history, and problem list.    Review of Systems   Constitutional:  Negative for activity change, chills and fever.   Respiratory:  Negative for cough.    Cardiovascular:  Negative for chest pain.   Psychiatric/Behavioral:  Negative for dysphoric mood.      /76   Pulse 78   Temp 97.9 øF (36.6 øC) (Oral)   Resp 16   Ht 180.3 cm (71\")   Wt 93.4 kg (206 lb)   SpO2 96%   BMI 28.73 kg/mý     Objective   Physical Exam  Constitutional:       General: He is not in acute distress.     Appearance: He is well-developed.   Pulmonary:      Effort: Pulmonary effort is normal.   Musculoskeletal:        Legs:       Comments: Quarter size semisoft, mobile subcutaneous nodule noted.   Neurological:      Mental Status: He is alert and oriented to person, place, and time.   Psychiatric:         Behavior: Behavior normal.         Thought Content: Thought content normal.       Assessment & Plan   Diagnoses and all orders for this visit:    1. Soft tissue lesion (Primary)    This appears to have been a muscle tear that tightened up on him, although certainly could be a cyst.  Regardless, it is improving, and recommend heat stretching and watchful waiting at this point.  If not resolved in 2 weeks, patient to reach back out to me and we will order an ultrasound to look into this " further.

## 2023-08-22 ENCOUNTER — OFFICE VISIT (OUTPATIENT)
Dept: FAMILY MEDICINE CLINIC | Facility: CLINIC | Age: 63
End: 2023-08-22
Payer: COMMERCIAL

## 2023-08-22 VITALS
RESPIRATION RATE: 16 BRPM | DIASTOLIC BLOOD PRESSURE: 76 MMHG | HEIGHT: 71 IN | SYSTOLIC BLOOD PRESSURE: 124 MMHG | HEART RATE: 78 BPM | OXYGEN SATURATION: 96 % | TEMPERATURE: 97.9 F | WEIGHT: 206 LBS | BODY MASS INDEX: 28.84 KG/M2

## 2023-08-22 DIAGNOSIS — M79.9 SOFT TISSUE LESION: Primary | ICD-10-CM

## 2023-08-22 PROCEDURE — 99212 OFFICE O/P EST SF 10 MIN: CPT | Performed by: FAMILY MEDICINE

## 2023-08-23 ENCOUNTER — ANESTHESIA (OUTPATIENT)
Dept: GASTROENTEROLOGY | Facility: HOSPITAL | Age: 63
End: 2023-08-23
Payer: COMMERCIAL

## 2023-08-23 ENCOUNTER — HOSPITAL ENCOUNTER (OUTPATIENT)
Facility: HOSPITAL | Age: 63
Setting detail: HOSPITAL OUTPATIENT SURGERY
Discharge: HOME | End: 2023-08-23
Attending: SURGERY | Admitting: SURGERY
Payer: COMMERCIAL

## 2023-08-23 ENCOUNTER — ANESTHESIA EVENT (OUTPATIENT)
Dept: GASTROENTEROLOGY | Facility: HOSPITAL | Age: 63
End: 2023-08-23
Payer: COMMERCIAL

## 2023-08-23 VITALS
HEART RATE: 68 BPM | BODY MASS INDEX: 27.99 KG/M2 | WEIGHT: 195.5 LBS | OXYGEN SATURATION: 95 % | RESPIRATION RATE: 16 BRPM | HEIGHT: 70 IN | SYSTOLIC BLOOD PRESSURE: 110 MMHG | DIASTOLIC BLOOD PRESSURE: 83 MMHG | TEMPERATURE: 98.3 F

## 2023-08-23 DIAGNOSIS — Z12.11 SCREEN FOR COLON CANCER: ICD-10-CM

## 2023-08-23 LAB — GLUCOSE BLDC GLUCOMTR-MCNC: 126 MG/DL (ref 70–130)

## 2023-08-23 PROCEDURE — 88305 TISSUE EXAM BY PATHOLOGIST: CPT | Performed by: SURGERY

## 2023-08-23 PROCEDURE — 25010000002 PROPOFOL 10 MG/ML EMULSION: Performed by: NURSE ANESTHETIST, CERTIFIED REGISTERED

## 2023-08-23 PROCEDURE — 82948 REAGENT STRIP/BLOOD GLUCOSE: CPT

## 2023-08-23 PROCEDURE — 45380 COLONOSCOPY AND BIOPSY: CPT | Performed by: SURGERY

## 2023-08-23 RX ORDER — LIDOCAINE HYDROCHLORIDE 20 MG/ML
INJECTION, SOLUTION INFILTRATION; PERINEURAL AS NEEDED
Status: DISCONTINUED | OUTPATIENT
Start: 2023-08-23 | End: 2023-08-23 | Stop reason: SURG

## 2023-08-23 RX ORDER — SODIUM CHLORIDE, SODIUM LACTATE, POTASSIUM CHLORIDE, CALCIUM CHLORIDE 600; 310; 30; 20 MG/100ML; MG/100ML; MG/100ML; MG/100ML
1000 INJECTION, SOLUTION INTRAVENOUS CONTINUOUS
Status: DISCONTINUED | OUTPATIENT
Start: 2023-08-23 | End: 2023-08-23 | Stop reason: HOSPADM

## 2023-08-23 RX ORDER — PROPOFOL 10 MG/ML
VIAL (ML) INTRAVENOUS AS NEEDED
Status: DISCONTINUED | OUTPATIENT
Start: 2023-08-23 | End: 2023-08-23 | Stop reason: SURG

## 2023-08-23 RX ADMIN — SODIUM CHLORIDE, POTASSIUM CHLORIDE, SODIUM LACTATE AND CALCIUM CHLORIDE 1000 ML: 600; 310; 30; 20 INJECTION, SOLUTION INTRAVENOUS at 07:34

## 2023-08-23 RX ADMIN — PROPOFOL 160 MCG/KG/MIN: 10 INJECTION, EMULSION INTRAVENOUS at 08:02

## 2023-08-23 RX ADMIN — PROPOFOL 100 MG: 10 INJECTION, EMULSION INTRAVENOUS at 08:02

## 2023-08-23 RX ADMIN — LIDOCAINE HYDROCHLORIDE 60 MG: 20 INJECTION, SOLUTION INFILTRATION; PERINEURAL at 08:02

## 2023-08-23 NOTE — OP NOTE
PREOPERATIVE DIAGNOSIS:  Screening    POSTOPERATIVE DIAGNOSIS AND FINDINGS:  2 small descending colon polyps    PROCEDURE:  Colonoscopy to cecum with cold biopsy removal of polyps    SURGEON:  Kedar Reyes MD    ANESTHESIA:  MAC    SPECIMEN(S):  Polyps    DESCRIPTION:  In decubitus position digital rectal exam was normal. Colonoscope inserted under direct visualization of lumen to cecum confirmed by visualization of ileocecal valve and appendiceal orifice.  Scope was slowly drawn circumferentially examining all mucosal surfaces.  Bowel preparation was good.  2 small descending colon polyps were identified and removed completely with cold biopsy forceps, good hemostasis at each site.  No other mucosal abnormalities were noted.  Tolerated well.    RECOMMENDATION FOR FUTURE SURVEILLANCE:  To be determined based on polyp pathology and issued as separate report    Kedar Reyes M.D.

## 2023-08-23 NOTE — H&P
CC: Screening    HPI: 63-year-old gentleman due for screening colonoscopy, no family history of colon cancer    PMH, PSH, MEDS AND ALLERGIES reviewed and reconciled in  EPIC    PHYSICAL EXAM:  Constitutional:  awake, alert, no acute distress  VS: afebrile, VSS  Respiratory:  normal inspiratory effort  Cardiovascular: regular rate  Gastrointestinal: Soft    ROS:  relevant systems negative other than any presenting complaints    ASSESSMENT:    Screening    PLAN:  Colonoscopy    Kedar Reyes M.D.

## 2023-08-23 NOTE — DISCHARGE INSTRUCTIONS
For the next 24 hours patient needs to be with a responsible adult.    For 24 hours DO NOT drive, operate machinery, appliances, drink alcohol, make important decisions or sign legal documents.    Start with a light or bland diet if you are feeling sick to your stomach otherwise advance to regular diet as tolerated.    Follow recommendations on procedure report if provided by your doctor.    Call Dr Reyes for problems [phone]     Problems may include but not limited to: large amounts of bleeding, trouble breathing, repeated vomiting, severe unrelieved pain, fever or chills.

## 2023-08-23 NOTE — ANESTHESIA PREPROCEDURE EVALUATION
Anesthesia Evaluation     Patient summary reviewed and Nursing notes reviewed                Airway   Mallampati: II  TM distance: >3 FB  Neck ROM: full  Dental      Pulmonary - negative pulmonary ROS   Cardiovascular     ECG reviewed  Rhythm: regular  Rate: normal    (+) hypertension, CAD, cardiac stents more than 12 months ago , hyperlipidemia      Neuro/Psych- negative ROS  GI/Hepatic/Renal/Endo    (+) diabetes mellitus type 2    Musculoskeletal     Abdominal    Substance History - negative use     OB/GYN negative ob/gyn ROS         Other   arthritis,                   Anesthesia Plan    ASA 3     MAC     intravenous induction     Anesthetic plan, risks, benefits, and alternatives have been provided, discussed and informed consent has been obtained with: patient.    Plan discussed with CRNA.    CODE STATUS:

## 2023-08-23 NOTE — ANESTHESIA POSTPROCEDURE EVALUATION
Patient: Chemo Lin    Procedure Summary       Date: 08/23/23 Room / Location:  [facility]    Anesthesia Start: 0759 Anesthesia Stop: 0821    Procedure: COLONOSCOPY TO CECUM WITH COLD BX POLYPECTOMIES Diagnosis:       Screen for colon cancer      (Screen for colon cancer [Z12.11])    Surgeons: Kedar Reyes MD Provider: Ganga Agustin MD    Anesthesia Type: MAC ASA Status: 3            Anesthesia Type: MAC    Vitals  Vitals Value Taken Time   /83 08/23/23 0839   Temp     Pulse 68 08/23/23 0839   Resp 16 08/23/23 0839   SpO2 95 % 08/23/23 0839           Post Anesthesia Care and Evaluation    Patient location during evaluation: PACU  Patient participation: complete - patient participated  Level of consciousness: awake and alert  Pain management: adequate    Airway patency: patent  Anesthetic complications: No anesthetic complications    Cardiovascular status: acceptable  Respiratory status: acceptable  Hydration status: acceptable    Comments: --------------------            08/23/23               0839     --------------------   BP:       110/83     Pulse:      68       Resp:       16       Temp:                SpO2:      95%      --------------------

## 2023-08-24 LAB
LAB AP CASE REPORT: NORMAL
PATH REPORT.FINAL DX SPEC: NORMAL
PATH REPORT.GROSS SPEC: NORMAL

## 2023-08-25 ENCOUNTER — DOCUMENTATION (OUTPATIENT)
Dept: SURGERY | Facility: CLINIC | Age: 63
End: 2023-08-25
Payer: COMMERCIAL

## 2023-08-25 NOTE — PROGRESS NOTES
ENDOSCOPY FOLLOW UP NOTE    Colonoscopy 8/23/2023    Indication:  Screening    Findings:  2 small descending colon polyps: Pathology-tubular adenomas    Recommendations:  5-year surveillance    Kedar Reyes M.D.

## 2023-08-26 DIAGNOSIS — I10 ESSENTIAL (PRIMARY) HYPERTENSION: ICD-10-CM

## 2023-08-28 ENCOUNTER — TELEPHONE (OUTPATIENT)
Dept: SURGERY | Facility: CLINIC | Age: 63
End: 2023-08-28
Payer: COMMERCIAL

## 2023-08-28 RX ORDER — CHLORTHALIDONE 25 MG/1
TABLET ORAL
Qty: 90 TABLET | Refills: 0 | Status: SHIPPED | OUTPATIENT
Start: 2023-08-28

## 2023-08-28 NOTE — TELEPHONE ENCOUNTER
----- Message from Kedar Reyes MD sent at 8/25/2023 10:10 AM EDT -----  Please let him know that he had 2 benign polyps and 5-year surveillance colonoscopy recommended-put in computer for reminder

## 2023-09-03 DIAGNOSIS — E11.65 TYPE 2 DIABETES MELLITUS WITH HYPERGLYCEMIA: ICD-10-CM

## 2023-09-05 ENCOUNTER — DOCUMENTATION (OUTPATIENT)
Dept: CARDIOLOGY | Facility: CLINIC | Age: 63
End: 2023-09-05
Payer: COMMERCIAL

## 2023-09-05 ENCOUNTER — TELEPHONE (OUTPATIENT)
Dept: CARDIOLOGY | Facility: CLINIC | Age: 63
End: 2023-09-05
Payer: COMMERCIAL

## 2023-09-05 RX ORDER — EMPAGLIFLOZIN 25 MG/1
TABLET, FILM COATED ORAL
Qty: 90 TABLET | Refills: 1 | Status: SHIPPED | OUTPATIENT
Start: 2023-09-05

## 2023-09-05 NOTE — TELEPHONE ENCOUNTER
See below.    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     Please advise.    Thanks,  Trice

## 2023-09-05 NOTE — TELEPHONE ENCOUNTER
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

## 2023-09-05 NOTE — PROGRESS NOTES
9/5/2023    To whom it may concern,    I follow Mr. Chemo Lin, date of birth 1960, in my cardiology clinic.  He has a medical history of coronary disease and prior stent to the obtuse marginal branch, hypertension and hyperlipidemia.  He denies any recent issues with chest pain.  He has undergone treadmill stress testing within the last 2 years and exercised over 8 minutes achieving 10 metabolic equivalents.  He had no chest pain or ST changes consistent with ischemia.  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    Cedric Briseno MD, FACC

## 2023-09-12 DIAGNOSIS — E11.8 TYPE 2 DIABETES MELLITUS WITH UNSPECIFIED COMPLICATIONS: ICD-10-CM

## 2023-09-13 ENCOUNTER — TELEPHONE (OUTPATIENT)
Dept: UROLOGY | Facility: CLINIC | Age: 63
End: 2023-09-13
Payer: COMMERCIAL

## 2023-09-13 RX ORDER — BLOOD SUGAR DIAGNOSTIC
STRIP MISCELLANEOUS
Qty: 180 EACH | Refills: 3 | Status: SHIPPED | OUTPATIENT
Start: 2023-09-13

## 2023-09-13 NOTE — TELEPHONE ENCOUNTER
PT CX'D HIS APPT WITH [facility] FOR 9/8/23, SPOKE TO PT AND HE STATED THAT HE DIDN'T WANT TO RS AT THIS TIME BUT HE WOULD CALL BACK WHEN HE IS READY, ANYTHING ELSE TO DO?

## 2023-09-14 DIAGNOSIS — E11.65 UNCONTROLLED TYPE 2 DIABETES MELLITUS WITH HYPERGLYCEMIA: ICD-10-CM

## 2023-09-14 RX ORDER — GLIPIZIDE 10 MG/1
TABLET, FILM COATED, EXTENDED RELEASE ORAL
Qty: 90 TABLET | Refills: 1 | Status: SHIPPED | OUTPATIENT
Start: 2023-09-14

## 2023-09-20 DIAGNOSIS — I10 ESSENTIAL (PRIMARY) HYPERTENSION: ICD-10-CM

## 2023-09-20 RX ORDER — VALSARTAN 40 MG/1
TABLET ORAL
Qty: 90 TABLET | Refills: 3 | Status: SHIPPED | OUTPATIENT
Start: 2023-09-20

## 2023-10-02 ENCOUNTER — PATIENT MESSAGE (OUTPATIENT)
Dept: DIABETES SERVICES | Facility: HOSPITAL | Age: 63
End: 2023-10-02
Payer: COMMERCIAL

## 2023-10-03 RX ORDER — FLASH GLUCOSE SENSOR
1 KIT MISCELLANEOUS ONCE
Qty: 1 EACH | Refills: 0 | Status: SHIPPED | OUTPATIENT
Start: 2023-10-03 | End: 2023-10-03

## 2023-10-03 NOTE — TELEPHONE ENCOUNTER
From: Chemo Lin  To: Kelly Quintero  Sent: 10/2/2023 7:44 PM EDT  Subject: [employer] setup    I put one of my new [employer] patches on and tried to get my phone to work it, but it did not so I called the backup people at [employer] and we went through it again and it did not work so I took the other one while she was on the phone with me, put it on my arm and it did not work either so they’re going to send me two new ones and they said that if I got in touch with you, you could write a prescription for a [employer] patch reader Daily they would send me one, but I have to have a prescription for it and it would probably be sent to [facility] so if you have any questions, you’re free to call me if you would like or send me a note that’s fine and I will Thank you I appreciate it

## 2023-10-22 LAB
CHOLEST SERPL-MCNC: 157 MG/DL (ref 100–199)
HDLC SERPL-MCNC: 27 MG/DL
LDLC SERPL CALC-MCNC: 109 MG/DL (ref 0–99)
TRIGL SERPL-MCNC: 111 MG/DL (ref 0–149)
VLDLC SERPL CALC-MCNC: 21 MG/DL (ref 5–40)

## 2023-10-25 NOTE — PROGRESS NOTES
"Subjective   Chemo Lin is a 63 y.o. male.     CC: Cough    History of Present Illness     Patient comes in today reporting 3 to 4-week history of nasal congestion, now leading to a cough.  Denies dyspnea.  Denies fever/chills. Has some green nasal d/c. Using Allegra and some Flonase. Cough is mild and worse at night.       The following portions of the patient's history were reviewed and updated as appropriate: allergies, current medications, past family history, past medical history, past social history, past surgical history, and problem list.    Review of Systems   Constitutional:  Negative for activity change, chills and fever.   HENT:  Positive for congestion.    Respiratory:  Positive for cough.    Cardiovascular:  Negative for chest pain.   Psychiatric/Behavioral:  Negative for dysphoric mood.        /71   Pulse 79   Temp 97.7 °F (36.5 °C) (Oral)   Resp 16   Ht 177.8 cm (70\")   Wt 88.5 kg (195 lb)   SpO2 98%   BMI 27.98 kg/m²     Objective   Physical Exam  Constitutional:       General: He is not in acute distress.     Appearance: He is well-developed.   HENT:      Right Ear: Tympanic membrane and ear canal normal.      Left Ear: Tympanic membrane and ear canal normal.   Cardiovascular:      Rate and Rhythm: Normal rate and regular rhythm.   Pulmonary:      Effort: Pulmonary effort is normal.      Breath sounds: Normal breath sounds.   Neurological:      Mental Status: He is alert and oriented to person, place, and time.   Psychiatric:         Behavior: Behavior normal.         Thought Content: Thought content normal.         Assessment & Plan   Diagnoses and all orders for this visit:    1. Acute URI (Primary)  -     amoxicillin-clavulanate (AUGMENTIN) 875-125 MG per tablet; Take 1 tablet by mouth Every 12 (Twelve) Hours.  Dispense: 20 tablet; Refill: 0                 "

## 2023-10-26 ENCOUNTER — OFFICE VISIT (OUTPATIENT)
Dept: FAMILY MEDICINE CLINIC | Facility: CLINIC | Age: 63
End: 2023-10-26
Payer: COMMERCIAL

## 2023-10-26 VITALS
OXYGEN SATURATION: 98 % | RESPIRATION RATE: 16 BRPM | BODY MASS INDEX: 27.92 KG/M2 | HEIGHT: 70 IN | WEIGHT: 195 LBS | HEART RATE: 79 BPM | SYSTOLIC BLOOD PRESSURE: 119 MMHG | DIASTOLIC BLOOD PRESSURE: 71 MMHG | TEMPERATURE: 97.7 F

## 2023-10-26 DIAGNOSIS — J06.9 ACUTE URI: Primary | ICD-10-CM

## 2023-10-26 PROCEDURE — 99213 OFFICE O/P EST LOW 20 MIN: CPT | Performed by: FAMILY MEDICINE

## 2023-10-26 RX ORDER — AMOXICILLIN AND CLAVULANATE POTASSIUM 875; 125 MG/1; MG/1
1 TABLET, FILM COATED ORAL EVERY 12 HOURS SCHEDULED
Qty: 20 TABLET | Refills: 0 | Status: SHIPPED | OUTPATIENT
Start: 2023-10-26

## 2023-10-30 NOTE — PROGRESS NOTES
Chief Complaint  Diabetes and Med Management    Referred By: Roly Barreto MD    Subjective          Chemo Lin presents to [facility] for diabetes medication management    History of Present Illness    Visit type:  follow-up  Diabetes type:  Type 2  Current diabetes status/concerns/issues:  He has received his Davy device but has not yet started it  Other health concerns: he is on an antibiotic for sinus infection  Current Diabetes symptoms:    Polyuria: No   Polydipsia: No   Polyphagia: No   Blurred vision: No   Excessive fatigue: No  Known Diabetes complications:  Neuropathy: None; Location: N/A  Renal: Stage II mild (GFR = 60-89 mL/min)  Eyes: None; Location: N/A  Amputation/Wounds: None  GI: None  Cardiovascular: Hypertension, Hyperlipidemia, and CAD  ED: Patient Reported  Other: None  Hypoglycemia:  None reported at this time  Hypoglycemia Symptoms:  No hypoglycemia at this time  Current diabetes treatment:     Glipizide XL 10 mg once a day, Jardiance 25 mg once a day, metformin 1000 mg twice a day, Tresiba 51 units each day.  Tradjenta 5 mg once a day was added at last appointment  Blood glucose device:  Meter  Blood glucose monitoring frequency:  1 - 2  Blood glucose range/average:   the 14 day average glucose is 115 mg/dL  Glucose Source: Device Reviewed  Diet:  Limits high carb/sweet foods, Avoids sugary drinks  Activity/Exercise:   he tries to remain active    Past Medical History:   Diagnosis Date    Allergic rhinitis     Arthritis of back 11-15-21    Back xray from [facility]    Coronary artery disease     History of echocardiogram 04/15/2020    11/26/2019-Calculated EF = 63% Mild mitral valve regurgitation is present Mild tricuspid valve regurgitation is present.       History of stress test 04/15/2020    11/26/2019-Diaphragmatic attenuation artifact is present. Left ventricular ejection fraction is normal (Calculated EF = 64%). Myocardial perfusion imaging  indicates a normal myocardial perfusion study with no evidence of ischemia. Impressions are consistent with a low risk study. Findings consistent with an equivocal ECG stress test    Hyperlipidemia     Hypertension     type 2 diabetes mellitus, uncontrolled    Rotator cuff syndrome 12-23-21    [facility] [place]    Tendinitis     Type 2 diabetes mellitus      Past Surgical History:   Procedure Laterality Date    CATARACT EXTRACTION, BILATERAL      CHOLECYSTECTOMY WITH INTRAOPERATIVE CHOLANGIOGRAM N/A 5/15/2023    Procedure: CHOLECYSTECTOMY LAPAROSCOPIC INTRAOPERATIVE CHOLANGIOGRAM;  Surgeon: Kedar Reyes MD;  Location: [facility] MAIN OR;  Service: General;  Laterality: N/A;    COLONOSCOPY      COLONOSCOPY N/A 8/23/2023    Procedure: COLONOSCOPY TO CECUM WITH COLD BX POLYPECTOMIES;  Surgeon: Kedar Reyes MD;  Location: [facility] ENDOSCOPY;  Service: General;  Laterality: N/A;  POLYPS    CORONARY ANGIOPLASTY WITH STENT PLACEMENT      10 years ago    FOOT SURGERY Right     great toe joint repair    SHOULDER ARTHROSCOPY W/ ROTATOR CUFF REPAIR Right 02/18/2022    Procedure: SHOULDER ARTHROSCOPY WITH ROTATOR CUFF REPAIR COMPLEX, OPEN BICEPS TENODESIS;  Surgeon: Kar Newman MD;  Location: [facility] OR;  Service: Orthopedics;  Laterality: Right;     Family History   Problem Relation Age of Onset    Hypertension Mother     Heart disease Father     Hypertension Father     Lung cancer Father     Cancer Father     Diabetes Father     Diabetes Paternal Grandmother     Diabetes Maternal Grandmother     Malig Hyperthermia Neg Hx      Social History     Socioeconomic History    Marital status:    Tobacco Use    Smoking status: Never     Passive exposure: Never    Smokeless tobacco: Never   Vaping Use    Vaping Use: Never used   Substance and Sexual Activity    Alcohol use: No    Drug use: Never    Sexual activity: Yes     Partners: Female     Birth control/protection: Surgical     No Known Allergies    Current  Outpatient Medications:     amoxicillin-clavulanate (AUGMENTIN) 875-125 MG per tablet, Take 1 tablet by mouth Every 12 (Twelve) Hours., Disp: 20 tablet, Rfl: 0    aspirin 81 MG tablet, Take 1 tablet by mouth Every Night., Disp: , Rfl:     cetirizine (zyrTEC) 5 MG tablet, Take 1 tablet by mouth 2 (Two) Times a Day. Once in AM and once in PM, Disp: , Rfl:     chlorthalidone (HYGROTON) 25 MG tablet, TAKE ONE TABLET BY MOUTH DAILY, Disp: 90 tablet, Rfl: 0    empagliflozin (Jardiance) 25 MG tablet tablet, Take 1 tablet by mouth Daily for 180 days., Disp: 90 tablet, Rfl: 1    ezetimibe (ZETIA) 10 MG tablet, Take 1 tablet by mouth Daily., Disp: 90 tablet, Rfl: 3    fluticasone (FLONASE) 50 MCG/ACT nasal spray, 1 spray into the nostril(s) as directed by provider As Needed., Disp: , Rfl:     glipizide (GLUCOTROL XL) 10 MG 24 hr tablet, Take 1 tablet by mouth Daily for 180 days., Disp: 90 tablet, Rfl: 1    glucose blood (Accu-Chek Malaika Plus) test strip, USE TWICE DAILY WITH MEALS, Disp: 180 each, Rfl: 3    linagliptin (Tradjenta) 5 MG tablet tablet, Take 1 tablet by mouth Daily for 180 days., Disp: 90 tablet, Rfl: 1    metFORMIN ER (GLUCOPHAGE-XR) 500 MG 24 hr tablet, Take 2 tablets by mouth Daily With Breakfast & Dinner for 180 days., Disp: 360 tablet, Rfl: 1    rosuvastatin (CRESTOR) 40 MG tablet, Take 1 tablet by mouth Daily. (Patient taking differently: Take 1 tablet by mouth Every Morning.), Disp: 90 tablet, Rfl: 3    sildenafil (REVATIO) 20 MG tablet, Take 1-5 tablets 1 hour prior to intercourse as needed, Disp: 30 tablet, Rfl: 1    Tresiba FlexTouch 100 UNIT/ML solution pen-injector injection, Inject 51 Units under the skin into the appropriate area as directed Daily for 180 days., Disp: 45 mL, Rfl: 1    valsartan (DIOVAN) 40 MG tablet, TAKE ONE TABLET BY MOUTH EVERY DAY, Disp: 90 tablet, Rfl: 3    Continuous Blood Gluc Sensor (FreeStyle Davy 2 Sensor) misc, Use 1 each Every 14 (Fourteen) Days., Disp: 2 each, Rfl:  "11    Objective     Vitals:    11/03/23 1542   BP: 105/58   Pulse: 82   SpO2: 93%   Weight: 92.5 kg (204 lb)   Height: 177.8 cm (70\")     Body mass index is 29.27 kg/m².    Physical Exam  Constitutional:       Appearance: Normal appearance.      Comments: Overweight (BMI 25 - 29.9) Pt Current BMI = 29.27    HENT:      Head: Normocephalic and atraumatic.      Right Ear: External ear normal.      Left Ear: External ear normal.      Nose: Nose normal.   Eyes:      Extraocular Movements: Extraocular movements intact.      Conjunctiva/sclera: Conjunctivae normal.   Pulmonary:      Effort: Pulmonary effort is normal.   Musculoskeletal:         General: Normal range of motion.      Cervical back: Normal range of motion.   Skin:     General: Skin is warm and dry.   Neurological:      General: No focal deficit present.      Mental Status: He is alert and oriented to person, place, and time. Mental status is at baseline.   Psychiatric:         Mood and Affect: Mood normal.         Behavior: Behavior normal.         Thought Content: Thought content normal.         Judgment: Judgment normal.             Result Review :   The following data was reviewed by: VESNA Salter on 11/03/2023:    Most Recent A1C          11/3/2023    15:52   HGBA1C Most Recent   Hemoglobin A1C 7.2        A1C Last 3 Results          3/14/2023    08:27 7/7/2023    16:45 11/3/2023    15:52   HGBA1C Last 3 Results   Hemoglobin A1C 7.6  7.5  7.2      A1c collected in the office today is 7.2%, indicating Uncontrolled Type II diabetes.  This result is down from the prior result of 7.5% collected on 7/7/2023            Assessment: The patient has had improvement in his A1c and is just above target now.  We attempted to get him started on the sybil 3 device but determined that his phone was incompatible with this version.      Diagnoses and all orders for this visit:    1. Uncontrolled type 2 diabetes mellitus with hyperglycemia (Primary)  -     POC " Glycosylated Hemoglobin (Hb A1C)  -     Continuous Blood Gluc Sensor (FreeStyle Davy 2 Sensor) misc; Use 1 each Every 14 (Fourteen) Days.  Dispense: 2 each; Refill: 11  -     Tresiba FlexTouch 100 UNIT/ML solution pen-injector injection; Inject 51 Units under the skin into the appropriate area as directed Daily for 180 days.  Dispense: 45 mL; Refill: 1  -     metFORMIN ER (GLUCOPHAGE-XR) 500 MG 24 hr tablet; Take 2 tablets by mouth Daily With Breakfast & Dinner for 180 days.  Dispense: 360 tablet; Refill: 1  -     linagliptin (Tradjenta) 5 MG tablet tablet; Take 1 tablet by mouth Daily for 180 days.  Dispense: 90 tablet; Refill: 1  -     empagliflozin (Jardiance) 25 MG tablet tablet; Take 1 tablet by mouth Daily for 180 days.  Dispense: 90 tablet; Refill: 1  -     glipizide (GLUCOTROL XL) 10 MG 24 hr tablet; Take 1 tablet by mouth Daily for 180 days.  Dispense: 90 tablet; Refill: 1    2. Type 2 diabetes mellitus with stage 2 chronic kidney disease, with long-term current use of insulin    3. Overweight (BMI 25.0-29.9)        Plan: We made no changes to the patient's current treatment plan today.  Prescription for the Davy 2 device was sent to his pharmacy as an alternative to the current device.  The patient will focus on ongoing dietary strategies to help control glucose levels.    The patient will monitor his blood glucose levels using the continuous glucose sensor.  If he develops problematic hyperglycemia or hypoglycemia or adverse drug reactions, he will contact the office for further instructions.        Follow Up     Return in about 3 months (around 2/3/2024) for Medication Management, CGM Follow-up.    Patient was given instructions and counseling regarding his condition or for health maintenance advice. Please see specific information pulled into the AVS if appropriate.     Kelly Quintero, APRN  11/03/2023      Dictated Utilizing Dragon Dictation.  Please note that portions of this note were  completed with a voice recognition program.  Part of this note may be an electronic transcription/translation of spoken language to printed text using the Dragon Dictation System.

## 2023-11-03 ENCOUNTER — OFFICE VISIT (OUTPATIENT)
Dept: DIABETES SERVICES | Facility: HOSPITAL | Age: 63
End: 2023-11-03
Payer: COMMERCIAL

## 2023-11-03 VITALS
WEIGHT: 204 LBS | SYSTOLIC BLOOD PRESSURE: 105 MMHG | HEIGHT: 70 IN | DIASTOLIC BLOOD PRESSURE: 58 MMHG | BODY MASS INDEX: 29.2 KG/M2 | HEART RATE: 82 BPM | OXYGEN SATURATION: 93 %

## 2023-11-03 DIAGNOSIS — E66.3 OVERWEIGHT (BMI 25.0-29.9): ICD-10-CM

## 2023-11-03 DIAGNOSIS — E11.22 TYPE 2 DIABETES MELLITUS WITH STAGE 2 CHRONIC KIDNEY DISEASE, WITH LONG-TERM CURRENT USE OF INSULIN: ICD-10-CM

## 2023-11-03 DIAGNOSIS — E11.65 UNCONTROLLED TYPE 2 DIABETES MELLITUS WITH HYPERGLYCEMIA: Primary | ICD-10-CM

## 2023-11-03 DIAGNOSIS — Z79.4 TYPE 2 DIABETES MELLITUS WITH STAGE 2 CHRONIC KIDNEY DISEASE, WITH LONG-TERM CURRENT USE OF INSULIN: ICD-10-CM

## 2023-11-03 DIAGNOSIS — N18.2 TYPE 2 DIABETES MELLITUS WITH STAGE 2 CHRONIC KIDNEY DISEASE, WITH LONG-TERM CURRENT USE OF INSULIN: ICD-10-CM

## 2023-11-03 LAB
EXPIRATION DATE: ABNORMAL
HBA1C MFR BLD: 7.2 % (ref 4.5–5.7)
Lab: ABNORMAL

## 2023-11-03 PROCEDURE — G0463 HOSPITAL OUTPT CLINIC VISIT: HCPCS | Performed by: NURSE PRACTITIONER

## 2023-11-03 PROCEDURE — 99213 OFFICE O/P EST LOW 20 MIN: CPT | Performed by: NURSE PRACTITIONER

## 2023-11-03 RX ORDER — INSULIN DEGLUDEC INJECTION 100 U/ML
51 INJECTION, SOLUTION SUBCUTANEOUS DAILY
Qty: 45 ML | Refills: 1 | Status: SHIPPED | OUTPATIENT
Start: 2023-11-03 | End: 2024-05-01

## 2023-11-03 RX ORDER — METFORMIN HYDROCHLORIDE 500 MG/1
1000 TABLET, EXTENDED RELEASE ORAL
Qty: 360 TABLET | Refills: 1 | Status: SHIPPED | OUTPATIENT
Start: 2023-11-03 | End: 2024-05-01

## 2023-11-03 RX ORDER — GLIPIZIDE 10 MG/1
10 TABLET, FILM COATED, EXTENDED RELEASE ORAL DAILY
Qty: 90 TABLET | Refills: 1 | Status: SHIPPED | OUTPATIENT
Start: 2023-11-03 | End: 2024-05-01

## 2023-11-10 ENCOUNTER — TELEPHONE (OUTPATIENT)
Dept: DIABETES SERVICES | Facility: HOSPITAL | Age: 63
End: 2023-11-10
Payer: COMMERCIAL

## 2023-11-10 NOTE — TELEPHONE ENCOUNTER
PA SUBMITTED 11-10-23 PHARMACY DATA MANAG. [phone]    STATED FOR PHARMACY TO BILL [employer] PROVIDED PHARM WITH NUMBER TO CALL REGARDING PA

## 2023-11-16 ENCOUNTER — TELEPHONE (OUTPATIENT)
Dept: ORTHOPEDIC SURGERY | Facility: CLINIC | Age: 63
End: 2023-11-16
Payer: COMMERCIAL

## 2023-11-16 NOTE — TELEPHONE ENCOUNTER
Caller: Chemo Lin    Relationship to patient: Self    Best call back number: [phone]    Chief complaint: RT SHOULDER     Type of visit: INJECTION     Requested date: ASAP. [facility]    If rescheduling, when is the original appointment:     Additional notes: PATIENT REQUESTING INJECTION. LAST VISIT 03/23/23- HUB UNABLE TO CONFIRM TYPE OF INJECTION, PATIENT NOT SURE IF HE HAD ONE ON LAST VISIT. PATIENT WAS TREATED UNDER W/C, HE SAID HE WAS RELEASED FROM W/C THEREFORE HE IS WILLING TO USE PERSONAL INSURANCE. PLEASE REVIEW AND ADVISE.

## 2023-11-29 DIAGNOSIS — I10 ESSENTIAL (PRIMARY) HYPERTENSION: ICD-10-CM

## 2023-11-29 RX ORDER — CHLORTHALIDONE 25 MG/1
25 TABLET ORAL DAILY
Qty: 90 TABLET | Refills: 0 | Status: SHIPPED | OUTPATIENT
Start: 2023-11-29

## 2023-12-06 ENCOUNTER — OFFICE VISIT (OUTPATIENT)
Dept: ORTHOPEDIC SURGERY | Facility: CLINIC | Age: 63
End: 2023-12-06
Payer: COMMERCIAL

## 2023-12-06 VITALS — HEIGHT: 70 IN | WEIGHT: 204 LBS | BODY MASS INDEX: 29.2 KG/M2

## 2023-12-06 DIAGNOSIS — M75.51 BURSITIS OF RIGHT SHOULDER: ICD-10-CM

## 2023-12-06 DIAGNOSIS — Z98.890 STATUS POST ARTHROSCOPY OF SHOULDER: Primary | ICD-10-CM

## 2023-12-06 RX ADMIN — LIDOCAINE HYDROCHLORIDE 4 ML: 10 INJECTION, SOLUTION EPIDURAL; INFILTRATION; INTRACAUDAL; PERINEURAL at 11:23

## 2023-12-06 RX ADMIN — TRIAMCINOLONE ACETONIDE 80 MG: 40 INJECTION, SUSPENSION INTRA-ARTICULAR; INTRAMUSCULAR at 11:23

## 2023-12-06 NOTE — PROGRESS NOTES
Subjective:     Patient ID: Chemo Lin is a 63 y.o. male.    Chief Complaint:  Follow-up right shoulder pain  Prior right shoulder massive rotator cuff repair, biceps tenodesis-2/18/2022    History of Present Illness  Chemo Lin returns to clinic today for evaluation of right shoulder. He states that he has noted moderate recurrence of pain over the last 4 to 6 weeks to the lateral aspect of his right shoulder, aching in nature. He denies any paresthesia. He denies any sharp pain. No significant radiation down into his arm. He feels like it may be due to repetitive shifting gears in his truck, which puts his arm in a little more extended position. He denies any associated neck pain at this point in time. He has had minimal improvement with soft tissue massage, activity modification, rest, stretching, and anti-inflammatory medications.     Social History     Occupational History    Not on file   Tobacco Use    Smoking status: Never     Passive exposure: Never    Smokeless tobacco: Never   Vaping Use    Vaping Use: Never used   Substance and Sexual Activity    Alcohol use: No    Drug use: Never    Sexual activity: Yes     Partners: Female     Birth control/protection: Surgical      Past Medical History:   Diagnosis Date    Allergic rhinitis     Arthritis of back 11-15-21    Back xray from [facility]    Coronary artery disease     History of echocardiogram 04/15/2020    11/26/2019-Calculated EF = 63% Mild mitral valve regurgitation is present Mild tricuspid valve regurgitation is present.       History of stress test 04/15/2020    11/26/2019-Diaphragmatic attenuation artifact is present. Left ventricular ejection fraction is normal (Calculated EF = 64%). Myocardial perfusion imaging indicates a normal myocardial perfusion study with no evidence of ischemia. Impressions are consistent with a low risk study. Findings consistent with an equivocal ECG stress test    Hyperlipidemia     Hypertension     type 2  "diabetes mellitus, uncontrolled    Rotator cuff syndrome 12-23-21    [facility] [place]    Tendinitis     Type 2 diabetes mellitus      Past Surgical History:   Procedure Laterality Date    CATARACT EXTRACTION, BILATERAL      CHOLECYSTECTOMY WITH INTRAOPERATIVE CHOLANGIOGRAM N/A 5/15/2023    Procedure: CHOLECYSTECTOMY LAPAROSCOPIC INTRAOPERATIVE CHOLANGIOGRAM;  Surgeon: Kedar Reyes MD;  Location: [facility] MAIN OR;  Service: General;  Laterality: N/A;    COLONOSCOPY      COLONOSCOPY N/A 8/23/2023    Procedure: COLONOSCOPY TO CECUM WITH COLD BX POLYPECTOMIES;  Surgeon: Kedar Reyes MD;  Location: [facility] ENDOSCOPY;  Service: General;  Laterality: N/A;  POLYPS    CORONARY ANGIOPLASTY WITH STENT PLACEMENT      10 years ago    FOOT SURGERY Right     great toe joint repair    SHOULDER ARTHROSCOPY W/ ROTATOR CUFF REPAIR Right 02/18/2022    Procedure: SHOULDER ARTHROSCOPY WITH ROTATOR CUFF REPAIR COMPLEX, OPEN BICEPS TENODESIS;  Surgeon: Kar Newman MD;  Location: [facility] OR;  Service: Orthopedics;  Laterality: Right;       Family History   Problem Relation Age of Onset    Hypertension Mother     Heart disease Father     Hypertension Father     Lung cancer Father     Cancer Father     Diabetes Father     Diabetes Paternal Grandmother     Diabetes Maternal Grandmother     Malig Hyperthermia Neg Hx          Review of Systems        Objective:  Vitals:    12/06/23 1053   Weight: 92.5 kg (204 lb)   Height: 177.8 cm (70\")         12/06/23  1053   Weight: 92.5 kg (204 lb)     Body mass index is 29.27 kg/m².  General: No acute distress.  Resp: normal respiratory effort  Skin: no rashes or wounds; normal turgor  Psych: mood and affect appropriate; recent and remote memory intact        Ortho Exam       Right shoulder-  Incision is well healed  FF- Active- 175  Strength- 4/5  ER- Active- 50  Strength- 4/5  IR T12  Strength- 5/5  Bear hug sign- Negative  Empty can test- Mildly positive  Drop arm test- Negative  Ext " rotation lag sign- Negative  Neer's sign- Positive  Lee- Positive  Positive deltoid firing in all three components  Brisk cap refill to all digits, palpable 2+ radial pulse right wrist      Imaging:  None today  Assessment:        1. Status post right shoulder arthroscopic rotator cuff repair with use of [employer] bio inductive implant, open biceps tenodesis DOS 02/18/2022    2. Bursitis of right shoulder           Plan:    - Large Joint Arthrocentesis: R subacromial bursa on 12/6/2023 11:23 AM  Indications: pain  Details: 22 G needle, lateral approach  Medications: 80 mg triamcinolone acetonide 40 MG/ML; 4 mL lidocaine PF 1% 1 %  Outcome: tolerated well, no immediate complications  Procedure, treatment alternatives, risks and benefits explained, specific risks discussed. Consent was given by the patient. Immediately prior to procedure a time out was called to verify the correct patient, procedure, equipment, support staff and site/side marked as required. Patient was prepped and draped in the usual sterile fashion.             1. I discussed treatment options at length with patient at today's visit.    2. At this point in time, given his recurrence of pain in his subacromial bursa with his repetitive activity, we discussed options. He would like to proceed with subacromial injection today.    3. Patient would like to proceed with cortisone injection today to the right shoulder subacromial space. Recommended limited use of affected extremity for the next 24 hours to only essential activities other than work on general active and passive motion. Recommended supplementing with ice and soft tissue massage. Discussed with patient that they should see results in 5-7 days, if no improvement in 5-6 weeks I have asked them to call the office to review other options. Patient should call office immediately if they notice redness, warmth, fevers, chills, or residual numbness or tingling for greater than 6 hours after  injection.    4. He is to continue with home exercises to work on range of motion and strength as tolerated.    5. He will follow up as needed if this is incompletely resolving the symptoms.    Chemo Lin was in agreement with plan and had all questions answered.     Orders:  Orders Placed This Encounter   Procedures    - Large Joint Arthrocentesis: R subacromial bursa       Medications:  No orders of the defined types were placed in this encounter.      Followup:  Return if symptoms worsen or fail to improve.    Diagnoses and all orders for this visit:    1. Status post right shoulder arthroscopic rotator cuff repair with use of [employer] bio inductive implant, open biceps tenodesis DOS 02/18/2022 (Primary)    2. Bursitis of right shoulder    Other orders  -     - Large Joint Arthrocentesis: R subacromial bursa          Dictated utilizing Dragon dictation     Transcribed from ambient dictation for Kar Newman MD by Lisa Rivera.  12/06/23   13:58 EST    Patient or patient representative verbalized consent to the visit recording.  I have personally performed the services described in this document as transcribed by the above individual, and it is both accurate and complete.

## 2023-12-17 RX ORDER — LIDOCAINE HYDROCHLORIDE 10 MG/ML
4 INJECTION, SOLUTION EPIDURAL; INFILTRATION; INTRACAUDAL; PERINEURAL
Status: COMPLETED | OUTPATIENT
Start: 2023-12-06 | End: 2023-12-06

## 2023-12-17 RX ORDER — TRIAMCINOLONE ACETONIDE 40 MG/ML
80 INJECTION, SUSPENSION INTRA-ARTICULAR; INTRAMUSCULAR
Status: COMPLETED | OUTPATIENT
Start: 2023-12-06 | End: 2023-12-06

## 2024-01-08 ENCOUNTER — PATIENT MESSAGE (OUTPATIENT)
Dept: DIABETES SERVICES | Facility: HOSPITAL | Age: 64
End: 2024-01-08
Payer: COMMERCIAL

## 2024-01-08 NOTE — TELEPHONE ENCOUNTER
From: Chemo Lin  To: Kelly Quintero  Sent: 1/8/2024 9:47 AM EST  Subject: Prescriptions    [place] this is Chemo Lin five [id number] my provider who said that I wasn’t gonna be able to use Tradjenta anymore and they gave me two options Januvia, 100 mg or sax at [facility] I don’t know how many milligrams that was if I could use Januvia that would work that would be fine with me. If you have any questions you can call me or text me on this thing here. I thank you and if you need anything holler bye

## 2024-01-31 DIAGNOSIS — E78.00 PURE HYPERCHOLESTEROLEMIA: ICD-10-CM

## 2024-01-31 RX ORDER — EZETIMIBE 10 MG/1
10 TABLET ORAL DAILY
Qty: 90 TABLET | Refills: 3 | Status: SHIPPED | OUTPATIENT
Start: 2024-01-31

## 2024-01-31 RX ORDER — ROSUVASTATIN CALCIUM 40 MG/1
40 TABLET, COATED ORAL DAILY
Qty: 90 TABLET | Refills: 3 | Status: SHIPPED | OUTPATIENT
Start: 2024-01-31

## 2024-02-16 ENCOUNTER — OFFICE VISIT (OUTPATIENT)
Dept: DIABETES SERVICES | Facility: HOSPITAL | Age: 64
End: 2024-02-16
Payer: COMMERCIAL

## 2024-02-16 ENCOUNTER — TELEPHONE (OUTPATIENT)
Dept: DIABETES SERVICES | Facility: HOSPITAL | Age: 64
End: 2024-02-16

## 2024-02-16 VITALS
BODY MASS INDEX: 29.63 KG/M2 | HEIGHT: 70 IN | HEART RATE: 76 BPM | WEIGHT: 207 LBS | DIASTOLIC BLOOD PRESSURE: 69 MMHG | SYSTOLIC BLOOD PRESSURE: 130 MMHG | OXYGEN SATURATION: 95 %

## 2024-02-16 DIAGNOSIS — E11.65 UNCONTROLLED TYPE 2 DIABETES MELLITUS WITH HYPERGLYCEMIA: Primary | ICD-10-CM

## 2024-02-16 DIAGNOSIS — E11.22 TYPE 2 DIABETES MELLITUS WITH STAGE 2 CHRONIC KIDNEY DISEASE, WITH LONG-TERM CURRENT USE OF INSULIN: ICD-10-CM

## 2024-02-16 DIAGNOSIS — Z79.4 TYPE 2 DIABETES MELLITUS WITH STAGE 2 CHRONIC KIDNEY DISEASE, WITH LONG-TERM CURRENT USE OF INSULIN: ICD-10-CM

## 2024-02-16 DIAGNOSIS — N18.2 TYPE 2 DIABETES MELLITUS WITH STAGE 2 CHRONIC KIDNEY DISEASE, WITH LONG-TERM CURRENT USE OF INSULIN: ICD-10-CM

## 2024-02-16 LAB
EXPIRATION DATE: ABNORMAL
GLUCOSE BLDC GLUCOMTR-MCNC: 268 MG/DL (ref 70–99)
HBA1C MFR BLD: 7.2 % (ref 4.5–5.7)
Lab: ABNORMAL

## 2024-02-16 PROCEDURE — 82948 REAGENT STRIP/BLOOD GLUCOSE: CPT | Performed by: NURSE PRACTITIONER

## 2024-02-16 PROCEDURE — G0463 HOSPITAL OUTPT CLINIC VISIT: HCPCS | Performed by: NURSE PRACTITIONER

## 2024-02-16 PROCEDURE — 99214 OFFICE O/P EST MOD 30 MIN: CPT | Performed by: NURSE PRACTITIONER

## 2024-02-16 RX ORDER — PEN NEEDLE, DIABETIC 32GX 5/32"
NEEDLE, DISPOSABLE MISCELLANEOUS
COMMUNITY
Start: 2024-01-23

## 2024-02-16 RX ORDER — ACYCLOVIR 400 MG/1
1 TABLET ORAL
Qty: 3 EACH | Refills: 5 | Status: SHIPPED | OUTPATIENT
Start: 2024-02-16

## 2024-02-20 DIAGNOSIS — I10 ESSENTIAL (PRIMARY) HYPERTENSION: ICD-10-CM

## 2024-02-21 RX ORDER — CHLORTHALIDONE 25 MG/1
25 TABLET ORAL DAILY
Qty: 90 TABLET | Refills: 0 | Status: SHIPPED | OUTPATIENT
Start: 2024-02-21

## 2024-04-12 ENCOUNTER — TELEPHONE (OUTPATIENT)
Dept: FAMILY MEDICINE CLINIC | Facility: CLINIC | Age: 64
End: 2024-04-12

## 2024-04-12 NOTE — TELEPHONE ENCOUNTER
Caller: Chemo Lin    Relationship to patient: Self    Best call back number: [phone]     Chief complaint: RUNNING NOSE, HEAD CONGESTION, DRAINAGE    Type of visit: SAME DAY    Requested date: 04-     Additional notes:PATIENT IS REQUESTING TO BE SEEN TODAY, 04- FOR HIS SYMPTOMS.    PLEASE CALL TO ADVISE OR SCHEDULE.

## 2024-04-14 NOTE — PROGRESS NOTES
"Subjective   Chemo Lin is a 63 y.o. male.     CC: URI-Sx    History of Present Illness     Pt comes in today reporting 1 week h/o facial p/p, mild cough (better), PND, congestion. No f/c/dyspnea.      The following portions of the patient's history were reviewed and updated as appropriate: allergies, current medications, past family history, past medical history, past social history, past surgical history, and problem list.    Review of Systems   Constitutional:  Negative for activity change, chills and fever.   HENT:  Positive for congestion and sinus pressure.    Respiratory:  Positive for cough (better).    Cardiovascular:  Negative for chest pain.   Psychiatric/Behavioral:  Negative for dysphoric mood.        /85   Pulse 82   Temp 97.8 °F (36.6 °C) (Oral)   Resp 16   Ht 177.8 cm (70\")   Wt 93 kg (205 lb)   SpO2 93%   BMI 29.41 kg/m²     Objective   Physical Exam  Constitutional:       General: He is not in acute distress.     Appearance: He is well-developed.   HENT:      Right Ear: Tympanic membrane and ear canal normal.      Left Ear: Tympanic membrane and ear canal normal.      Nose:      Right Sinus: Maxillary sinus tenderness present.      Left Sinus: Maxillary sinus tenderness present.   Cardiovascular:      Rate and Rhythm: Normal rate and regular rhythm.   Pulmonary:      Effort: Pulmonary effort is normal.      Breath sounds: Normal breath sounds.   Neurological:      Mental Status: He is alert and oriented to person, place, and time.   Psychiatric:         Behavior: Behavior normal.         Thought Content: Thought content normal.         Assessment & Plan   Diagnoses and all orders for this visit:    1. Acute non-recurrent maxillary sinusitis (Primary)  -     Chlorcyclizine-Pseudoephed (Stahist AD) 25-60 MG tablet; Take 1 tablet by mouth 2 (Two) Times a Day As Needed (sinus).  Dispense: 60 tablet; Refill: 11  -     amoxicillin-clavulanate (AUGMENTIN) 875-125 MG per tablet; Take 1 " tablet by mouth Every 12 (Twelve) Hours.  Dispense: 20 tablet; Refill: 0

## 2024-04-15 ENCOUNTER — OFFICE VISIT (OUTPATIENT)
Dept: FAMILY MEDICINE CLINIC | Facility: CLINIC | Age: 64
End: 2024-04-15
Payer: COMMERCIAL

## 2024-04-15 VITALS
RESPIRATION RATE: 16 BRPM | SYSTOLIC BLOOD PRESSURE: 137 MMHG | HEART RATE: 82 BPM | OXYGEN SATURATION: 93 % | WEIGHT: 205 LBS | TEMPERATURE: 97.8 F | HEIGHT: 70 IN | BODY MASS INDEX: 29.35 KG/M2 | DIASTOLIC BLOOD PRESSURE: 85 MMHG

## 2024-04-15 DIAGNOSIS — Z00.00 ANNUAL PHYSICAL EXAM: Primary | ICD-10-CM

## 2024-04-15 DIAGNOSIS — J01.00 ACUTE NON-RECURRENT MAXILLARY SINUSITIS: Primary | ICD-10-CM

## 2024-04-15 PROCEDURE — 99213 OFFICE O/P EST LOW 20 MIN: CPT | Performed by: FAMILY MEDICINE

## 2024-04-15 RX ORDER — CHLORCYCLIZINE HYDROCHLORIDE AND PSEUDOEPHEDRINE HYDROCHLORIDE 25; 60 MG/1; MG/1
1 TABLET ORAL 2 TIMES DAILY PRN
Qty: 60 TABLET | Refills: 11 | Status: SHIPPED | OUTPATIENT
Start: 2024-04-15

## 2024-04-15 RX ORDER — AMOXICILLIN AND CLAVULANATE POTASSIUM 875; 125 MG/1; MG/1
1 TABLET, FILM COATED ORAL EVERY 12 HOURS SCHEDULED
Qty: 20 TABLET | Refills: 0 | Status: SHIPPED | OUTPATIENT
Start: 2024-04-15

## 2024-04-18 ENCOUNTER — OFFICE VISIT (OUTPATIENT)
Dept: CARDIOLOGY | Facility: CLINIC | Age: 64
End: 2024-04-18
Payer: COMMERCIAL

## 2024-04-18 VITALS
WEIGHT: 207 LBS | HEIGHT: 70 IN | HEART RATE: 81 BPM | DIASTOLIC BLOOD PRESSURE: 74 MMHG | BODY MASS INDEX: 29.63 KG/M2 | SYSTOLIC BLOOD PRESSURE: 132 MMHG

## 2024-04-18 DIAGNOSIS — E78.5 DYSLIPIDEMIA: Chronic | ICD-10-CM

## 2024-04-18 DIAGNOSIS — I25.10 CORONARY ARTERY DISEASE INVOLVING NATIVE CORONARY ARTERY OF NATIVE HEART WITHOUT ANGINA PECTORIS: Primary | ICD-10-CM

## 2024-04-18 DIAGNOSIS — I10 ESSENTIAL HYPERTENSION: Chronic | ICD-10-CM

## 2024-04-18 PROCEDURE — 99214 OFFICE O/P EST MOD 30 MIN: CPT | Performed by: NURSE PRACTITIONER

## 2024-04-18 PROCEDURE — 93000 ELECTROCARDIOGRAM COMPLETE: CPT | Performed by: NURSE PRACTITIONER

## 2024-04-18 NOTE — PROGRESS NOTES
Date of Office Visit: 2024  Encounter Provider: VESNA Ellison  Place of Service: [facility]  Patient Name: Chemo Lin  :1960    Chief Complaint   Patient presents with    Coronary Artery Disease   :     HPI: Chemo Lin is a 63 y.o. male patient of Dr. Briseno's with hypertension, hyperlipidemia, and coronary artery disease.  In , he underwent bare-metal stenting of the OM.    He was first seen in the office by Dr. Briseno in 2023 as a transfer of care.  He denied any recent issues with chest pain or dyspnea.  No changes were made to his regimen, and he was advised to follow-up in 1 year.    He has been feeling well.  He denies any chest pain, palpitations, edema, dizziness, or syncope.  He does report chronic and mild dyspnea on exertion that is unchanged.  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    Past Medical History:   Diagnosis Date    Allergic rhinitis     Arthritis of back 11-15-21    Back xray from [facility]    Coronary artery disease     History of echocardiogram 04/15/2020    2019-Calculated EF = 63% Mild mitral valve regurgitation is present Mild tricuspid valve regurgitation is present.       History of echocardiogram 04/15/2020    History of stress test 04/15/2020    2019-Diaphragmatic attenuation artifact is present. Left ventricular ejection fraction is normal (Calculated EF = 64%). Myocardial perfusion imaging indicates a normal myocardial perfusion study with no evidence of ischemia. Impressions are consistent with a low risk study. Findings consistent with an equivocal ECG stress test    Hyperlipidemia     Hypertension     type 2 diabetes mellitus, uncontrolled    Rotator cuff syndrome 21    MRI from [facility]    Tendinitis     Type 2 diabetes mellitus        Past Surgical History:   Procedure Laterality Date    CATARACT EXTRACTION, BILATERAL      CHOLECYSTECTOMY WITH  INTRAOPERATIVE CHOLANGIOGRAM N/A 5/15/2023    Procedure: CHOLECYSTECTOMY LAPAROSCOPIC INTRAOPERATIVE CHOLANGIOGRAM;  Surgeon: Kedar Reyes MD;  Location: [facility] MAIN OR;  Service: General;  Laterality: N/A;    COLONOSCOPY      COLONOSCOPY N/A 8/23/2023    Procedure: COLONOSCOPY TO CECUM WITH COLD BX POLYPECTOMIES;  Surgeon: Kedar Reyes MD;  Location: [facility] ENDOSCOPY;  Service: General;  Laterality: N/A;  POLYPS    CORONARY ANGIOPLASTY WITH STENT PLACEMENT      10 years ago    FOOT SURGERY Right     great toe joint repair    SHOULDER ARTHROSCOPY W/ ROTATOR CUFF REPAIR Right 02/18/2022    Procedure: SHOULDER ARTHROSCOPY WITH ROTATOR CUFF REPAIR COMPLEX, OPEN BICEPS TENODESIS;  Surgeon: Kar Newman MD;  Location:  [facility] OR;  Service: Orthopedics;  Laterality: Right;       Social History     Socioeconomic History    Marital status:    Tobacco Use    Smoking status: Never     Passive exposure: Never    Smokeless tobacco: Never   Vaping Use    Vaping status: Never Used   Substance and Sexual Activity    Alcohol use: No    Drug use: Never    Sexual activity: Yes     Partners: Female     Birth control/protection: Surgical       Family History   Problem Relation Age of Onset    Hypertension Mother     Heart disease Father     Hypertension Father     Lung cancer Father     Cancer Father     Diabetes Father     Diabetes Paternal Grandmother     Diabetes Maternal Grandmother     Malig Hyperthermia Neg Hx        Review of Systems   Constitutional: Negative.   Cardiovascular:  Positive for dyspnea on exertion. Negative for chest pain, leg swelling, orthopnea, paroxysmal nocturnal dyspnea and syncope.   Respiratory: Negative.     Hematologic/Lymphatic: Negative for bleeding problem.   Musculoskeletal:  Negative for falls.   Gastrointestinal:  Negative for melena.   Neurological:  Negative for dizziness and light-headedness.       No Known Allergies      Current Outpatient Medications:      amoxicillin-clavulanate (AUGMENTIN) 875-125 MG per tablet, Take 1 tablet by mouth Every 12 (Twelve) Hours., Disp: 20 tablet, Rfl: 0    aspirin 81 MG tablet, Take 1 tablet by mouth Every Night., Disp: , Rfl:     BD Pen Needle Beth U/F 32G X 4 MM misc, Inject 1 each into the skin daily., Disp: , Rfl:     cetirizine (zyrTEC) 5 MG tablet, Take 1 tablet by mouth 2 (Two) Times a Day. Once in AM and once in PM, Disp: , Rfl:     Chlorcyclizine-Pseudoephed (Stahist AD) 25-60 MG tablet, Take 1 tablet by mouth 2 (Two) Times a Day As Needed (sinus)., Disp: 60 tablet, Rfl: 11    chlorthalidone (HYGROTON) 25 MG tablet, TAKE ONE TABLET BY MOUTH EVERY DAY, Disp: 90 tablet, Rfl: 0    Continuous Blood Gluc Sensor (Dexcom G7 Sensor) misc, Use 1 each Every 10 (Ten) Days., Disp: 3 each, Rfl: 5    empagliflozin (Jardiance) 25 MG tablet tablet, Take 1 tablet by mouth Daily for 180 days., Disp: 90 tablet, Rfl: 1    ezetimibe (ZETIA) 10 MG tablet, Take 1 tablet by mouth Daily., Disp: 90 tablet, Rfl: 3    fluticasone (FLONASE) 50 MCG/ACT nasal spray, 1 spray into the nostril(s) as directed by provider As Needed., Disp: , Rfl:     glipizide (GLUCOTROL XL) 10 MG 24 hr tablet, Take 1 tablet by mouth Daily for 180 days., Disp: 90 tablet, Rfl: 1    glucose blood (Accu-Chek Malaika Plus) test strip, USE TWICE DAILY WITH MEALS, Disp: 180 each, Rfl: 3    metFORMIN ER (GLUCOPHAGE-XR) 500 MG 24 hr tablet, Take 2 tablets by mouth Daily With Breakfast & Dinner for 180 days., Disp: 360 tablet, Rfl: 1    rosuvastatin (CRESTOR) 40 MG tablet, Take 1 tablet by mouth Daily., Disp: 90 tablet, Rfl: 3    sildenafil (REVATIO) 20 MG tablet, Take 1-5 tablets 1 hour prior to intercourse as needed, Disp: 30 tablet, Rfl: 1    SITagliptin (Januvia) 100 MG tablet, Take 1 tablet by mouth Daily., Disp: 90 tablet, Rfl: 1    Tresiba FlexTouch 100 UNIT/ML solution pen-injector injection, Inject 51 Units under the skin into the appropriate area as directed Daily for 180 days.,  "Disp: 45 mL, Rfl: 1    valsartan (DIOVAN) 40 MG tablet, TAKE ONE TABLET BY MOUTH EVERY DAY, Disp: 90 tablet, Rfl: 3      Objective:     Vitals:    04/18/24 1439   BP: 132/74   Pulse: 81   Weight: 93.9 kg (207 lb)   Height: 177.8 cm (70\")     Body mass index is 29.7 kg/m².    PHYSICAL EXAM:    Neck:      Vascular: No JVD.   Pulmonary:      Effort: Pulmonary effort is normal.      Breath sounds: Normal breath sounds.   Cardiovascular:      Normal rate. Regular rhythm.      Murmurs: There is no murmur.      No gallop.  No click. No rub.   Pulses:     Intact distal pulses.           ECG 12 Lead    Date/Time: 4/18/2024 2:53 PM  Performed by: Salena Blackwell APRN    Authorized by: Salena Blackwell APRN  Comparison: compared with previous ECG from 5/15/2023  Similar to previous ECG  Rhythm: sinus rhythm  Rate: normal  BPM: 81            Assessment:       Diagnosis Plan   1. Coronary artery disease involving native coronary artery of native heart without angina pectoris  ECG 12 Lead      2. Essential hypertension        3. Dyslipidemia          Orders Placed This Encounter   Procedures    ECG 12 Lead     This order was created via procedure documentation     Order Specific Question:   Release to patient     Answer:   Routine Release [[id number]]          Plan:       1.  Coronary artery disease.  He denies any symptoms of angina.  Continue aspirin and rosuvastatin.      2.  Hypertension.  His blood pressure stable.  Continue valsartan.      3.  Hyperlipidemia.  Lipid panel from October demonstrated an LDL of 109 and an HDL of 27.  He is on rosuvastatin 40 mg and ezetimibe 10 mg.  I recommended initiating a PCSK9 inhibitor.  However, he is scheduled for follow-up and labs with his PCP next month and would like to wait until that time.      Overall I think he is stable.  I am not making any changes, and he will follow-up with Dr. Briseno in 1 year.      As always, it has been a pleasure to participate in your patient's " care.      Sincerely,         VESNA Moreland

## 2024-04-26 ENCOUNTER — TELEPHONE (OUTPATIENT)
Dept: DIABETES SERVICES | Facility: HOSPITAL | Age: 64
End: 2024-04-26
Payer: COMMERCIAL

## 2024-04-26 NOTE — TELEPHONE ENCOUNTER
Patient came into the office and dropped off [employer] Medical Report form for provider Kelly Quintero to fill out. The forms fee was collected by  staff and the form was placed in provider's basket for completion. Patient stated he was fine with the form being mailed back upon completion with a phone call to [phone] letting him know it's coming.

## 2024-05-01 DIAGNOSIS — E11.65 UNCONTROLLED TYPE 2 DIABETES MELLITUS WITH HYPERGLYCEMIA: ICD-10-CM

## 2024-05-01 RX ORDER — INSULIN DEGLUDEC 100 U/ML
51 INJECTION, SOLUTION SUBCUTANEOUS DAILY
Qty: 45 ML | Refills: 1 | Status: SHIPPED | OUTPATIENT
Start: 2024-05-01 | End: 2024-10-28

## 2024-05-17 ENCOUNTER — TELEPHONE (OUTPATIENT)
Dept: DIABETES SERVICES | Facility: HOSPITAL | Age: 64
End: 2024-05-17
Payer: COMMERCIAL

## 2024-05-17 DIAGNOSIS — E11.65 UNCONTROLLED TYPE 2 DIABETES MELLITUS WITH HYPERGLYCEMIA: ICD-10-CM

## 2024-05-17 RX ORDER — EMPAGLIFLOZIN 25 MG/1
25 TABLET, FILM COATED ORAL DAILY
Qty: 90 TABLET | Refills: 1 | Status: SHIPPED | OUTPATIENT
Start: 2024-05-17

## 2024-05-17 NOTE — TELEPHONE ENCOUNTER
ALLEY LUCIA (Key: [id number])  Rx #: [id number]  Jardiance 25MG tabletsApprovedtoday  Your PA request has been approved. Additional information will be provided in the approval communication. (Message 1140)

## 2024-05-20 LAB
ALBUMIN SERPL-MCNC: 4.4 G/DL (ref 3.9–4.9)
ALBUMIN/GLOB SERPL: 2.2 {RATIO} (ref 1.2–2.2)
ALP SERPL-CCNC: 62 IU/L (ref 44–121)
ALT SERPL-CCNC: 56 IU/L (ref 0–44)
AST SERPL-CCNC: 30 IU/L (ref 0–40)
BASOPHILS # BLD AUTO: 0.1 X10E3/UL (ref 0–0.2)
BASOPHILS NFR BLD AUTO: 1 %
BILIRUB SERPL-MCNC: 0.4 MG/DL (ref 0–1.2)
BUN SERPL-MCNC: 24 MG/DL (ref 8–27)
BUN/CREAT SERPL: 21 (ref 10–24)
CALCIUM SERPL-MCNC: 9.5 MG/DL (ref 8.6–10.2)
CHLORIDE SERPL-SCNC: 100 MMOL/L (ref 96–106)
CHOLEST SERPL-MCNC: 165 MG/DL (ref 100–199)
CO2 SERPL-SCNC: 28 MMOL/L (ref 20–29)
CREAT SERPL-MCNC: 1.14 MG/DL (ref 0.76–1.27)
EGFRCR SERPLBLD CKD-EPI 2021: 72 ML/MIN/1.73
EOSINOPHIL # BLD AUTO: 0.3 X10E3/UL (ref 0–0.4)
EOSINOPHIL NFR BLD AUTO: 5 %
ERYTHROCYTE [DISTWIDTH] IN BLOOD BY AUTOMATED COUNT: 12.5 % (ref 11.6–15.4)
GLOBULIN SER CALC-MCNC: 2 G/DL (ref 1.5–4.5)
GLUCOSE SERPL-MCNC: 96 MG/DL (ref 70–99)
HCT VFR BLD AUTO: 47.3 % (ref 37.5–51)
HDLC SERPL-MCNC: 29 MG/DL
HGB BLD-MCNC: 15.6 G/DL (ref 13–17.7)
IMM GRANULOCYTES # BLD AUTO: 0 X10E3/UL (ref 0–0.1)
IMM GRANULOCYTES NFR BLD AUTO: 0 %
LDLC SERPL CALC-MCNC: 111 MG/DL (ref 0–99)
LYMPHOCYTES # BLD AUTO: 1.2 X10E3/UL (ref 0.7–3.1)
LYMPHOCYTES NFR BLD AUTO: 23 %
MCH RBC QN AUTO: 29.6 PG (ref 26.6–33)
MCHC RBC AUTO-ENTMCNC: 33 G/DL (ref 31.5–35.7)
MCV RBC AUTO: 90 FL (ref 79–97)
MONOCYTES # BLD AUTO: 0.6 X10E3/UL (ref 0.1–0.9)
MONOCYTES NFR BLD AUTO: 11 %
NEUTROPHILS # BLD AUTO: 3.1 X10E3/UL (ref 1.4–7)
NEUTROPHILS NFR BLD AUTO: 60 %
PLATELET # BLD AUTO: 175 X10E3/UL (ref 150–450)
POTASSIUM SERPL-SCNC: 3.8 MMOL/L (ref 3.5–5.2)
PROT SERPL-MCNC: 6.4 G/DL (ref 6–8.5)
PSA SERPL-MCNC: 0.8 NG/ML (ref 0–4)
RBC # BLD AUTO: 5.27 X10E6/UL (ref 4.14–5.8)
SODIUM SERPL-SCNC: 142 MMOL/L (ref 134–144)
TRIGL SERPL-MCNC: 140 MG/DL (ref 0–149)
TSH SERPL DL<=0.005 MIU/L-ACNC: 1.81 UIU/ML (ref 0.45–4.5)
VLDLC SERPL CALC-MCNC: 25 MG/DL (ref 5–40)
WBC # BLD AUTO: 5.2 X10E3/UL (ref 3.4–10.8)

## 2024-05-24 DIAGNOSIS — I10 ESSENTIAL (PRIMARY) HYPERTENSION: ICD-10-CM

## 2024-05-24 NOTE — TELEPHONE ENCOUNTER
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 He will be in the office on Friday May 31, 2024 and will  in person at his appointment.

## 2024-05-29 RX ORDER — CHLORTHALIDONE 25 MG/1
25 TABLET ORAL DAILY
Qty: 90 TABLET | Refills: 0 | OUTPATIENT
Start: 2024-05-29

## 2024-05-29 NOTE — PROGRESS NOTES
Chief Complaint:   Chief Complaint   Patient presents with    Hypertension    Hyperlipidemia    lesion on tongue     X  2 weeks to discuss per pt    [facility]   LAB RESULTS / MED REFILL        Chemo Lin 64 y.o. male who presents today for Medical Management of the below listed issues. He  has a problem list of   Patient Active Problem List   Diagnosis    Hyperuricemia    Low testosterone    Essential hypertension    Vitamin D deficiency    Dyslipidemia    Diabetes mellitus type 2, controlled, without complications    ED (erectile dysfunction)    CAD (coronary artery disease)    History of coronary artery stent placement    Respiratory insufficiency    Family history of premature coronary heart disease    Hand arthritis    History of stress test    History of echocardiogram    Complete tear of right rotator cuff    Subacromial impingement of right shoulder    Biceps tendinitis of right upper extremity    Status post right shoulder arthroscopic rotator cuff repair with use of [employer] bio inductive implant, open biceps tenodesis DOS 02/18/2022    RUQ pain    Screen for colon cancer   .  Since the last visit, He has overall felt well, and has very soon appointments with his eye doctor, endocrinologist, and dentist.  Patient does have a 2+ week history of a tenderness on the left side of his tongue.  This is a new development.  he has been compliant with   Current Outpatient Medications:     chlorthalidone (HYGROTON) 25 MG tablet, Take 1 tablet by mouth Daily., Disp: 90 tablet, Rfl: 1    ezetimibe (ZETIA) 10 MG tablet, Take 1 tablet by mouth Daily., Disp: 90 tablet, Rfl: 1    rosuvastatin (CRESTOR) 40 MG tablet, Take 1 tablet by mouth Daily., Disp: 90 tablet, Rfl: 1    valsartan (DIOVAN) 40 MG tablet, Take 1 tablet by mouth Daily., Disp: 90 tablet, Rfl: 1    aspirin 81 MG tablet, Take 1 tablet by mouth Every Night., Disp: , Rfl:     BD Pen Needle Beth U/F 32G X 4 MM misc, Inject 1 each into the skin daily.,  "Disp: , Rfl:     cetirizine (zyrTEC) 5 MG tablet, Take 1 tablet by mouth 2 (Two) Times a Day. Once in AM and once in PM, Disp: , Rfl:     Chlorcyclizine-Pseudoephed (Stahist AD) 25-60 MG tablet, Take 1 tablet by mouth 2 (Two) Times a Day As Needed (sinus)., Disp: 60 tablet, Rfl: 11    Continuous Blood Gluc Sensor (Dexcom G7 Sensor) misc, Use 1 each Every 10 (Ten) Days., Disp: 3 each, Rfl: 5    fluticasone (FLONASE) 50 MCG/ACT nasal spray, 1 spray into the nostril(s) as directed by provider As Needed., Disp: , Rfl:     glipizide (GLUCOTROL XL) 10 MG 24 hr tablet, Take 1 tablet by mouth Daily for 180 days., Disp: 90 tablet, Rfl: 1    glucose blood (Accu-Chek Malaika Plus) test strip, USE TWICE DAILY WITH MEALS, Disp: 180 each, Rfl: 3    Jardiance 25 MG tablet tablet, TAKE ONE TABLET BY MOUTH EVERY DAY, Disp: 90 tablet, Rfl: 1    metFORMIN ER (GLUCOPHAGE-XR) 500 MG 24 hr tablet, Take 2 tablets by mouth Daily With Breakfast & Dinner for 180 days., Disp: 360 tablet, Rfl: 1    sildenafil (REVATIO) 20 MG tablet, Take 1-5 tablets 1 hour prior to intercourse as needed, Disp: 30 tablet, Rfl: 1    SITagliptin (Januvia) 100 MG tablet, Take 1 tablet by mouth Daily., Disp: 90 tablet, Rfl: 1    Tresiba FlexTouch 100 UNIT/ML solution pen-injector injection, Inject 51 Units under the skin into the appropriate area as directed Daily for 180 days., Disp: 45 mL, Rfl: 1.  He denies medication side effects.    All of the other chronic condition(s) listed above are stable w/o issues.    /72   Pulse 78   Temp 97.7 °F (36.5 °C) (Oral)   Resp 16   Ht 177.8 cm (70\")   Wt 93.9 kg (207 lb)   SpO2 96%   BMI 29.70 kg/m²     Results for orders placed or performed in visit on 04/15/24   Comprehensive metabolic panel    Specimen: Blood   Result Value Ref Range    Glucose 96 70 - 99 mg/dL    BUN 24 8 - 27 mg/dL    Creatinine 1.14 0.76 - 1.27 mg/dL    EGFR Result 72 >59 mL/min/1.73    BUN/Creatinine Ratio 21 10 - 24    Sodium 142 134 - 144 " mmol/L    Potassium 3.8 3.5 - 5.2 mmol/L    Chloride 100 96 - 106 mmol/L    Total CO2 28 20 - 29 mmol/L    Calcium 9.5 8.6 - 10.2 mg/dL    Total Protein 6.4 6.0 - 8.5 g/dL    Albumin 4.4 3.9 - 4.9 g/dL    Globulin 2.0 1.5 - 4.5 g/dL    A/G Ratio 2.2 1.2 - 2.2    Total Bilirubin 0.4 0.0 - 1.2 mg/dL    Alkaline Phosphatase 62 44 - 121 IU/L    AST (SGOT) 30 0 - 40 IU/L    ALT (SGPT) 56 (H) 0 - 44 IU/L   Lipid panel    Specimen: Blood   Result Value Ref Range    Total Cholesterol 165 100 - 199 mg/dL    Triglycerides 140 0 - 149 mg/dL    HDL Cholesterol 29 (L) >39 mg/dL    VLDL Cholesterol Hardy 25 5 - 40 mg/dL    LDL Chol Calc (NIH) 111 (H) 0 - 99 mg/dL   TSH    Specimen: Blood   Result Value Ref Range    TSH 1.810 0.450 - 4.500 uIU/mL   PSA    Specimen: Blood   Result Value Ref Range    PSA 0.8 0.0 - 4.0 ng/mL   CBC and Differential    Specimen: Blood   Result Value Ref Range    WBC 5.2 3.4 - 10.8 x10E3/uL    RBC 5.27 4.14 - 5.80 x10E6/uL    Hemoglobin 15.6 13.0 - 17.7 g/dL    Hematocrit 47.3 37.5 - 51.0 %    MCV 90 79 - 97 fL    MCH 29.6 26.6 - 33.0 pg    MCHC 33.0 31.5 - 35.7 g/dL    RDW 12.5 11.6 - 15.4 %    Platelets 175 150 - 450 x10E3/uL    Neutrophil Rel % 60 Not Estab. %    Lymphocyte Rel % 23 Not Estab. %    Monocyte Rel % 11 Not Estab. %    Eosinophil Rel % 5 Not Estab. %    Basophil Rel % 1 Not Estab. %    Neutrophils Absolute 3.1 1.4 - 7.0 x10E3/uL    Lymphocytes Absolute 1.2 0.7 - 3.1 x10E3/uL    Monocytes Absolute 0.6 0.1 - 0.9 x10E3/uL    Eosinophils Absolute 0.3 0.0 - 0.4 x10E3/uL    Basophils Absolute 0.1 0.0 - 0.2 x10E3/uL    Immature Granulocyte Rel % 0 Not Estab. %    Immature Grans Absolute 0.0 0.0 - 0.1 x10E3/uL             The following portions of the patient's history were reviewed and updated as appropriate: allergies, current medications, past family history, past medical history, past social history, past surgical history, and problem list.    Review of Systems   Constitutional:  Negative for  activity change, chills and fever.   Respiratory:  Negative for cough.    Cardiovascular:  Negative for chest pain.   Psychiatric/Behavioral:  Negative for dysphoric mood.        Objective     BMI is >= 25 and <30. (Overweight) The following options were offered after discussion;: exercise counseling/recommendations and nutrition counseling/recommendations        Physical Exam  Constitutional:       General: He is not in acute distress.     Appearance: He is well-developed.   Pulmonary:      Effort: Pulmonary effort is normal.   Neurological:      Mental Status: He is alert and oriented to person, place, and time.   Psychiatric:         Behavior: Behavior normal.         Thought Content: Thought content normal.     Labs reviewed with pt today during visit. All questions answered.          Diagnoses and all orders for this visit:    1. Essential (primary) hypertension (Primary)  -     chlorthalidone (HYGROTON) 25 MG tablet; Take 1 tablet by mouth Daily.  Dispense: 90 tablet; Refill: 1  -     valsartan (DIOVAN) 40 MG tablet; Take 1 tablet by mouth Daily.  Dispense: 90 tablet; Refill: 1    2. Pure hypercholesterolemia  -     ezetimibe (ZETIA) 10 MG tablet; Take 1 tablet by mouth Daily.  Dispense: 90 tablet; Refill: 1  -     rosuvastatin (CRESTOR) 40 MG tablet; Take 1 tablet by mouth Daily.  Dispense: 90 tablet; Refill: 1    3. Tongue lesion  Comments:  Probable lichen planus    Patient seeing his dentist next week and will discuss this tongue lesion with them.    Patient also has discussed starting a PCSK9 with his cardiologist, and is going to reach back out to them to go ahead and start this to try to get his LDL to goal as he is already on max dose of Crestor and Zetia.

## 2024-05-30 ENCOUNTER — OFFICE VISIT (OUTPATIENT)
Dept: FAMILY MEDICINE CLINIC | Facility: CLINIC | Age: 64
End: 2024-05-30
Payer: COMMERCIAL

## 2024-05-30 VITALS
SYSTOLIC BLOOD PRESSURE: 119 MMHG | BODY MASS INDEX: 29.63 KG/M2 | WEIGHT: 207 LBS | RESPIRATION RATE: 16 BRPM | HEART RATE: 78 BPM | DIASTOLIC BLOOD PRESSURE: 72 MMHG | TEMPERATURE: 97.7 F | OXYGEN SATURATION: 96 % | HEIGHT: 70 IN

## 2024-05-30 DIAGNOSIS — K14.8 TONGUE LESION: ICD-10-CM

## 2024-05-30 DIAGNOSIS — E78.00 PURE HYPERCHOLESTEROLEMIA: Chronic | ICD-10-CM

## 2024-05-30 DIAGNOSIS — I10 ESSENTIAL (PRIMARY) HYPERTENSION: Primary | Chronic | ICD-10-CM

## 2024-05-30 PROCEDURE — 99214 OFFICE O/P EST MOD 30 MIN: CPT | Performed by: FAMILY MEDICINE

## 2024-05-30 RX ORDER — CHLORTHALIDONE 25 MG/1
25 TABLET ORAL DAILY
Qty: 90 TABLET | Refills: 1 | Status: SHIPPED | OUTPATIENT
Start: 2024-05-30

## 2024-05-30 RX ORDER — ROSUVASTATIN CALCIUM 40 MG/1
40 TABLET, COATED ORAL DAILY
Qty: 90 TABLET | Refills: 1 | Status: SHIPPED | OUTPATIENT
Start: 2024-05-30

## 2024-05-30 RX ORDER — EZETIMIBE 10 MG/1
10 TABLET ORAL DAILY
Qty: 90 TABLET | Refills: 1 | Status: SHIPPED | OUTPATIENT
Start: 2024-05-30

## 2024-05-30 RX ORDER — VALSARTAN 40 MG/1
40 TABLET ORAL DAILY
Qty: 90 TABLET | Refills: 1 | Status: SHIPPED | OUTPATIENT
Start: 2024-05-30

## 2024-05-31 ENCOUNTER — OFFICE VISIT (OUTPATIENT)
Dept: DIABETES SERVICES | Facility: HOSPITAL | Age: 64
End: 2024-05-31
Payer: COMMERCIAL

## 2024-05-31 VITALS
SYSTOLIC BLOOD PRESSURE: 133 MMHG | DIASTOLIC BLOOD PRESSURE: 73 MMHG | HEIGHT: 70 IN | OXYGEN SATURATION: 98 % | RESPIRATION RATE: 18 BRPM | BODY MASS INDEX: 29.2 KG/M2 | HEART RATE: 68 BPM | WEIGHT: 204 LBS | TEMPERATURE: 97.3 F

## 2024-05-31 DIAGNOSIS — N18.2 TYPE 2 DIABETES MELLITUS WITH STAGE 2 CHRONIC KIDNEY DISEASE, WITH LONG-TERM CURRENT USE OF INSULIN: ICD-10-CM

## 2024-05-31 DIAGNOSIS — Z97.8 USES SELF-APPLIED CONTINUOUS GLUCOSE MONITORING DEVICE: ICD-10-CM

## 2024-05-31 DIAGNOSIS — Z79.4 TYPE 2 DIABETES MELLITUS WITH STAGE 2 CHRONIC KIDNEY DISEASE, WITH LONG-TERM CURRENT USE OF INSULIN: ICD-10-CM

## 2024-05-31 DIAGNOSIS — E11.22 TYPE 2 DIABETES MELLITUS WITH STAGE 2 CHRONIC KIDNEY DISEASE, WITH LONG-TERM CURRENT USE OF INSULIN: ICD-10-CM

## 2024-05-31 DIAGNOSIS — E11.65 UNCONTROLLED TYPE 2 DIABETES MELLITUS WITH HYPERGLYCEMIA: Primary | ICD-10-CM

## 2024-05-31 LAB
EXPIRATION DATE: ABNORMAL
GLUCOSE BLDC GLUCOMTR-MCNC: 126 MG/DL (ref 70–99)
HBA1C MFR BLD: 7.1 % (ref 4.5–5.7)
Lab: ABNORMAL

## 2024-05-31 PROCEDURE — 99214 OFFICE O/P EST MOD 30 MIN: CPT | Performed by: NURSE PRACTITIONER

## 2024-05-31 PROCEDURE — 82948 REAGENT STRIP/BLOOD GLUCOSE: CPT | Performed by: NURSE PRACTITIONER

## 2024-05-31 PROCEDURE — G0463 HOSPITAL OUTPT CLINIC VISIT: HCPCS | Performed by: NURSE PRACTITIONER

## 2024-05-31 PROCEDURE — 83036 HEMOGLOBIN GLYCOSYLATED A1C: CPT | Performed by: NURSE PRACTITIONER

## 2024-05-31 PROCEDURE — 95251 CONT GLUC MNTR ANALYSIS I&R: CPT | Performed by: NURSE PRACTITIONER

## 2024-05-31 RX ORDER — METFORMIN HYDROCHLORIDE 500 MG/1
1000 TABLET, EXTENDED RELEASE ORAL
Qty: 360 TABLET | Refills: 1 | Status: SHIPPED | OUTPATIENT
Start: 2024-05-31 | End: 2024-11-27

## 2024-05-31 RX ORDER — GLIPIZIDE 10 MG/1
10 TABLET, FILM COATED, EXTENDED RELEASE ORAL DAILY
Qty: 90 TABLET | Refills: 1 | Status: SHIPPED | OUTPATIENT
Start: 2024-05-31 | End: 2024-11-27
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
## 2024-06-11 ENCOUNTER — TELEPHONE (OUTPATIENT)
Dept: DIABETES SERVICES | Facility: HOSPITAL | Age: 64
End: 2024-06-11
Payer: COMMERCIAL

## 2024-06-11 ENCOUNTER — PATIENT MESSAGE (OUTPATIENT)
Dept: DIABETES SERVICES | Facility: HOSPITAL | Age: 64
End: 2024-06-11
Payer: COMMERCIAL

## 2024-06-11 NOTE — TELEPHONE ENCOUNTER
Caller: Chemo Lin    Relationship to patient: Self    Best call back number: [phone]    Patient is needing: WOULD LIKE A CALL BACK FROM [facility]. PLEASE CALL PT TO ADVISE.

## 2024-06-12 DIAGNOSIS — I25.10 CORONARY ARTERY DISEASE INVOLVING NATIVE CORONARY ARTERY OF NATIVE HEART WITHOUT ANGINA PECTORIS: Primary | ICD-10-CM

## 2024-06-12 DIAGNOSIS — E78.5 DYSLIPIDEMIA: ICD-10-CM

## 2024-06-20 ENCOUNTER — TELEPHONE (OUTPATIENT)
Dept: FAMILY MEDICINE CLINIC | Facility: CLINIC | Age: 64
End: 2024-06-20
Payer: COMMERCIAL

## 2024-06-20 NOTE — TELEPHONE ENCOUNTER
Caller: Chemo Lin    Relationship: Self    Best call back number: [phone] (Mobile)     What is the medical concern/diagnosis: NEW ENDOCRINOLOGIST OFFICE THAT CAN SEE PATIENT FOR DIABETES AND HAS AN M.D. 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     PLEASE ADVISE PATIENT REGARDING THIS REQUEST ASAP

## 2024-06-26 ENCOUNTER — TELEPHONE (OUTPATIENT)
Dept: DIABETES SERVICES | Facility: HOSPITAL | Age: 64
End: 2024-06-26
Payer: COMMERCIAL

## 2024-06-26 NOTE — TELEPHONE ENCOUNTER
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 WHICH THEY SAID [facility] DIDNT GET IT. WOULD LIKE TO SEE IF HE CAN GET A COPY OF IT AND HE CAN PICK IT UP SO HE CAN TAKE IT TO [facility]. PLEASE CALL PT TO ADVISE.

## 2024-07-01 RX ORDER — SITAGLIPTIN 100 MG/1
100 TABLET, FILM COATED ORAL DAILY
Qty: 90 TABLET | Refills: 1 | Status: SHIPPED | OUTPATIENT
Start: 2024-07-01

## 2024-07-08 ENCOUNTER — TELEPHONE (OUTPATIENT)
Dept: CARDIOLOGY | Facility: CLINIC | Age: 64
End: 2024-07-08

## 2024-07-08 NOTE — TELEPHONE ENCOUNTER
I called ans spoke with the patient. This actually does not require a PA. I called the pharmacy his copay is $40

## 2024-07-08 NOTE — TELEPHONE ENCOUNTER
Caller: Chemo Lin    Relationship: Self    Best call back number: [phone]    What test/procedure requested: REPATHA    When is it needed: ASAP    Additional information or concerns: PT IS CALLING TO GET A PRIOR AUTHORIZATION FOR MEDICATION.

## 2024-07-09 NOTE — TELEPHONE ENCOUNTER
Hub staff attempted to follow warm transfer process and was unsuccessful     Caller: Chemo Lin    Relationship to patient: Self    Best call back number: [phone]    Patient is needing: PATIENT IS CALLING BACK ABOUT HIS PAPERWORK. THEY ARE SAYING IT IS NOT FILLED OUT CORRECTLY AND WOULD LIKE TO SPEAK TO THE   OR HAVE SOMEONE CALL THEM BACK . PLEASE ADVISE

## 2024-07-09 NOTE — TELEPHONE ENCOUNTER
This paperwork has been taken care of by Kelly Quintero for the 3rd time this afternoon. She has signed off on the fax and it has been sent back

## 2024-08-17 DIAGNOSIS — E11.65 UNCONTROLLED TYPE 2 DIABETES MELLITUS WITH HYPERGLYCEMIA: ICD-10-CM

## 2024-08-18 RX ORDER — ACYCLOVIR 400 MG/1
TABLET ORAL
Qty: 3 EACH | Refills: 5 | Status: SHIPPED | OUTPATIENT
Start: 2024-08-18

## 2024-08-29 ENCOUNTER — OFFICE VISIT (OUTPATIENT)
Dept: DIABETES SERVICES | Facility: HOSPITAL | Age: 64
End: 2024-08-29
Payer: COMMERCIAL

## 2024-08-29 VITALS
WEIGHT: 199.2 LBS | OXYGEN SATURATION: 98 % | HEART RATE: 78 BPM | TEMPERATURE: 98.1 F | SYSTOLIC BLOOD PRESSURE: 112 MMHG | BODY MASS INDEX: 28.52 KG/M2 | HEIGHT: 70 IN | DIASTOLIC BLOOD PRESSURE: 68 MMHG

## 2024-08-29 DIAGNOSIS — E11.22 CONTROLLED TYPE 2 DIABETES MELLITUS WITH STAGE 2 CHRONIC KIDNEY DISEASE, WITH LONG-TERM CURRENT USE OF INSULIN: Primary | ICD-10-CM

## 2024-08-29 DIAGNOSIS — Z97.8 USES SELF-APPLIED CONTINUOUS GLUCOSE MONITORING DEVICE: ICD-10-CM

## 2024-08-29 DIAGNOSIS — N18.2 CONTROLLED TYPE 2 DIABETES MELLITUS WITH STAGE 2 CHRONIC KIDNEY DISEASE, WITH LONG-TERM CURRENT USE OF INSULIN: Primary | ICD-10-CM

## 2024-08-29 DIAGNOSIS — E66.3 OVERWEIGHT (BMI 25.0-29.9): ICD-10-CM

## 2024-08-29 DIAGNOSIS — Z79.4 CONTROLLED TYPE 2 DIABETES MELLITUS WITH STAGE 2 CHRONIC KIDNEY DISEASE, WITH LONG-TERM CURRENT USE OF INSULIN: Primary | ICD-10-CM

## 2024-08-29 LAB
EXPIRATION DATE: ABNORMAL
GLUCOSE BLDC GLUCOMTR-MCNC: 155 MG/DL (ref 70–99)
HBA1C MFR BLD: 6.8 % (ref 4.5–5.7)
Lab: ABNORMAL

## 2024-08-29 PROCEDURE — 83036 HEMOGLOBIN GLYCOSYLATED A1C: CPT | Performed by: NURSE PRACTITIONER

## 2024-08-29 PROCEDURE — 82948 REAGENT STRIP/BLOOD GLUCOSE: CPT | Performed by: NURSE PRACTITIONER

## 2024-08-29 PROCEDURE — 82570 ASSAY OF URINE CREATININE: CPT | Performed by: NURSE PRACTITIONER

## 2024-08-29 PROCEDURE — G0463 HOSPITAL OUTPT CLINIC VISIT: HCPCS | Performed by: NURSE PRACTITIONER

## 2024-08-29 PROCEDURE — 82043 UR ALBUMIN QUANTITATIVE: CPT | Performed by: NURSE PRACTITIONER
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
## 2024-08-31 LAB
ALBUMIN UR-MCNC: <1.2 MG/DL
CREAT UR-MCNC: 95.5 MG/DL
MICROALBUMIN/CREAT UR: NORMAL MG/G{CREAT}

## 2024-09-11 ENCOUNTER — HOSPITAL ENCOUNTER (EMERGENCY)
Facility: HOSPITAL | Age: 64
Discharge: HOME | End: 2024-09-11
Attending: STUDENT IN AN ORGANIZED HEALTH CARE EDUCATION/TRAINING PROGRAM
Payer: COMMERCIAL

## 2024-09-11 VITALS
TEMPERATURE: 97.8 F | SYSTOLIC BLOOD PRESSURE: 135 MMHG | WEIGHT: 200 LBS | HEIGHT: 70 IN | RESPIRATION RATE: 16 BRPM | BODY MASS INDEX: 28.63 KG/M2 | OXYGEN SATURATION: 96 % | HEART RATE: 89 BPM | DIASTOLIC BLOOD PRESSURE: 81 MMHG

## 2024-09-11 DIAGNOSIS — S05.01XA CORNEAL ABRASION, RIGHT, INITIAL ENCOUNTER: Primary | ICD-10-CM

## 2024-09-11 PROCEDURE — 99283 EMERGENCY DEPT VISIT LOW MDM: CPT

## 2024-09-11 RX ORDER — OFLOXACIN 3 MG/ML
2 SOLUTION/ DROPS OPHTHALMIC 4 TIMES DAILY
Status: DISCONTINUED | OUTPATIENT
Start: 2024-09-11 | End: 2024-09-12 | Stop reason: HOSPADM

## 2024-09-11 RX ORDER — OFLOXACIN 3 MG/ML
2 SOLUTION/ DROPS OPHTHALMIC 4 TIMES DAILY
Qty: 5 ML | Refills: 0 | Status: SHIPPED | OUTPATIENT
Start: 2024-09-11

## 2024-09-11 RX ORDER — TETRACAINE HYDROCHLORIDE 5 MG/ML
2 SOLUTION OPHTHALMIC ONCE
Status: COMPLETED | OUTPATIENT
Start: 2024-09-11 | End: 2024-09-11

## 2024-09-11 RX ADMIN — FLUORESCEIN SODIUM 1 STRIP: 1 STRIP OPHTHALMIC at 22:18

## 2024-09-11 RX ADMIN — TETRACAINE HYDROCHLORIDE 2 DROP: 5 SOLUTION OPHTHALMIC at 22:19

## 2024-09-11 RX ADMIN — OFLOXACIN 2 DROP: 3 SOLUTION OPHTHALMIC at 22:19
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
## 2024-10-27 DIAGNOSIS — E11.65 UNCONTROLLED TYPE 2 DIABETES MELLITUS WITH HYPERGLYCEMIA: ICD-10-CM

## 2024-10-28 RX ORDER — INSULIN DEGLUDEC 100 U/ML
51 INJECTION, SOLUTION SUBCUTANEOUS DAILY
Qty: 45 ML | Refills: 1 | Status: SHIPPED | OUTPATIENT
Start: 2024-10-28 | End: 2025-04-26

## 2024-11-17 DIAGNOSIS — I10 ESSENTIAL (PRIMARY) HYPERTENSION: Chronic | ICD-10-CM

## 2024-11-17 RX ORDER — CHLORTHALIDONE 25 MG/1
25 TABLET ORAL DAILY
Qty: 30 TABLET | Refills: 0 | Status: SHIPPED | OUTPATIENT
Start: 2024-11-17

## 2024-11-20 DIAGNOSIS — E11.8 TYPE 2 DIABETES MELLITUS WITH UNSPECIFIED COMPLICATIONS: ICD-10-CM

## 2024-11-20 RX ORDER — PEN NEEDLE, DIABETIC 32GX 5/32"
NEEDLE, DISPOSABLE MISCELLANEOUS
Qty: 100 EACH | Refills: 11 | Status: SHIPPED | OUTPATIENT
Start: 2024-11-20

## 2024-11-21 ENCOUNTER — OFFICE VISIT (OUTPATIENT)
Dept: DIABETES SERVICES | Facility: HOSPITAL | Age: 64
End: 2024-11-21
Payer: COMMERCIAL

## 2024-11-21 VITALS
OXYGEN SATURATION: 100 % | TEMPERATURE: 98.2 F | HEART RATE: 73 BPM | HEIGHT: 70 IN | WEIGHT: 200.6 LBS | SYSTOLIC BLOOD PRESSURE: 131 MMHG | DIASTOLIC BLOOD PRESSURE: 73 MMHG | BODY MASS INDEX: 28.72 KG/M2

## 2024-11-21 DIAGNOSIS — E66.3 OVERWEIGHT (BMI 25.0-29.9): ICD-10-CM

## 2024-11-21 DIAGNOSIS — Z79.4 TYPE 2 DIABETES MELLITUS WITH STAGE 2 CHRONIC KIDNEY DISEASE, WITH LONG-TERM CURRENT USE OF INSULIN: ICD-10-CM

## 2024-11-21 DIAGNOSIS — E11.65 UNCONTROLLED TYPE 2 DIABETES MELLITUS WITH HYPERGLYCEMIA: Primary | ICD-10-CM

## 2024-11-21 DIAGNOSIS — N18.2 TYPE 2 DIABETES MELLITUS WITH STAGE 2 CHRONIC KIDNEY DISEASE, WITH LONG-TERM CURRENT USE OF INSULIN: ICD-10-CM

## 2024-11-21 DIAGNOSIS — E11.22 TYPE 2 DIABETES MELLITUS WITH STAGE 2 CHRONIC KIDNEY DISEASE, WITH LONG-TERM CURRENT USE OF INSULIN: ICD-10-CM

## 2024-11-21 LAB
EXPIRATION DATE: ABNORMAL
GLUCOSE BLDC GLUCOMTR-MCNC: 141 MG/DL (ref 70–99)
HBA1C MFR BLD: 7.3 % (ref 4.5–5.7)
Lab: ABNORMAL

## 2024-11-21 PROCEDURE — 95251 CONT GLUC MNTR ANALYSIS I&R: CPT | Performed by: NURSE PRACTITIONER

## 2024-11-21 PROCEDURE — 83036 HEMOGLOBIN GLYCOSYLATED A1C: CPT | Performed by: NURSE PRACTITIONER

## 2024-11-21 PROCEDURE — 82948 REAGENT STRIP/BLOOD GLUCOSE: CPT | Performed by: NURSE PRACTITIONER

## 2024-11-21 PROCEDURE — G0463 HOSPITAL OUTPT CLINIC VISIT: HCPCS | Performed by: NURSE PRACTITIONER

## 2024-11-21 PROCEDURE — 99214 OFFICE O/P EST MOD 30 MIN: CPT | Performed by: NURSE PRACTITIONER

## 2024-11-21 RX ORDER — GLIPIZIDE 5 MG/1
5 TABLET, FILM COATED, EXTENDED RELEASE ORAL DAILY
Qty: 90 TABLET | Refills: 1 | Status: SHIPPED | OUTPATIENT
Start: 2024-11-21 | End: 2025-05-20
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
## 2024-11-29 DIAGNOSIS — I10 ESSENTIAL (PRIMARY) HYPERTENSION: Chronic | ICD-10-CM

## 2024-12-01 RX ORDER — VALSARTAN 40 MG/1
40 TABLET ORAL DAILY
Qty: 90 TABLET | Refills: 0 | Status: SHIPPED | OUTPATIENT
Start: 2024-12-01

## 2024-12-05 ENCOUNTER — OFFICE VISIT (OUTPATIENT)
Dept: FAMILY MEDICINE CLINIC | Facility: CLINIC | Age: 64
End: 2024-12-05
Payer: COMMERCIAL

## 2024-12-05 VITALS
DIASTOLIC BLOOD PRESSURE: 76 MMHG | WEIGHT: 196.2 LBS | HEIGHT: 70 IN | TEMPERATURE: 96 F | SYSTOLIC BLOOD PRESSURE: 128 MMHG | BODY MASS INDEX: 28.09 KG/M2 | HEART RATE: 78 BPM | OXYGEN SATURATION: 94 %

## 2024-12-05 DIAGNOSIS — Z23 FLU VACCINE NEED: ICD-10-CM

## 2024-12-05 DIAGNOSIS — I10 ESSENTIAL (PRIMARY) HYPERTENSION: Primary | Chronic | ICD-10-CM

## 2024-12-05 DIAGNOSIS — E78.00 PURE HYPERCHOLESTEROLEMIA: Chronic | ICD-10-CM

## 2024-12-05 PROCEDURE — 99214 OFFICE O/P EST MOD 30 MIN: CPT | Performed by: FAMILY MEDICINE

## 2024-12-05 PROCEDURE — 90656 IIV3 VACC NO PRSV 0.5 ML IM: CPT | Performed by: FAMILY MEDICINE

## 2024-12-05 PROCEDURE — 90471 IMMUNIZATION ADMIN: CPT | Performed by: FAMILY MEDICINE

## 2024-12-05 RX ORDER — ROSUVASTATIN CALCIUM 40 MG/1
40 TABLET, COATED ORAL DAILY
Qty: 90 TABLET | Refills: 1 | Status: CANCELLED | OUTPATIENT
Start: 2024-12-05

## 2024-12-05 RX ORDER — VALSARTAN 40 MG/1
40 TABLET ORAL DAILY
Qty: 90 TABLET | Refills: 0 | Status: CANCELLED | OUTPATIENT
Start: 2024-12-05

## 2024-12-05 RX ORDER — CHLORTHALIDONE 25 MG/1
25 TABLET ORAL DAILY
Qty: 90 TABLET | Refills: 1 | Status: CANCELLED | OUTPATIENT
Start: 2024-12-05

## 2024-12-05 RX ORDER — VALSARTAN 40 MG/1
40 TABLET ORAL DAILY
Qty: 90 TABLET | Refills: 1 | Status: SHIPPED | OUTPATIENT
Start: 2024-12-05

## 2024-12-05 RX ORDER — CHLORTHALIDONE 25 MG/1
25 TABLET ORAL DAILY
Qty: 90 TABLET | Refills: 1 | Status: SHIPPED | OUTPATIENT
Start: 2024-12-05

## 2024-12-05 RX ORDER — EZETIMIBE 10 MG/1
10 TABLET ORAL DAILY
Qty: 90 TABLET | Refills: 1 | Status: SHIPPED | OUTPATIENT
Start: 2024-12-05

## 2024-12-05 RX ORDER — EZETIMIBE 10 MG/1
10 TABLET ORAL DAILY
Qty: 90 TABLET | Refills: 1 | Status: CANCELLED | OUTPATIENT
Start: 2024-12-05

## 2024-12-05 RX ORDER — ROSUVASTATIN CALCIUM 40 MG/1
40 TABLET, COATED ORAL DAILY
Qty: 90 TABLET | Refills: 1 | Status: SHIPPED | OUTPATIENT
Start: 2024-12-05

## 2024-12-05 NOTE — PROGRESS NOTES
Chief Complaint:   Chief Complaint   Patient presents with    [facility] Sahnideni Lin 64 y.o. male who presents today for Medical Management of the below listed issues. He  has a problem list of   Patient Active Problem List   Diagnosis    Hyperuricemia    Low testosterone    Essential hypertension    Vitamin D deficiency    Dyslipidemia    Diabetes mellitus type 2, controlled, without complications    ED (erectile dysfunction)    CAD (coronary artery disease)    History of coronary artery stent placement    Respiratory insufficiency    Family history of premature coronary heart disease    Hand arthritis    History of stress test    History of echocardiogram    Complete tear of right rotator cuff    Subacromial impingement of right shoulder    Biceps tendinitis of right upper extremity    Status post right shoulder arthroscopic rotator cuff repair with use of [employer] bio inductive implant, open biceps tenodesis DOS 02/18/2022    RUQ pain    Screen for colon cancer   .  Since the last visit, He has overall felt well.  he has been compliant with   Current Outpatient Medications:     aspirin 81 MG tablet, Take 1 tablet by mouth Every Night., Disp: , Rfl:     BD Pen Needle Beth U/F 32G X 4 MM misc, Inject 1 each into the skin daily., Disp: 100 each, Rfl: 11    cetirizine (zyrTEC) 5 MG tablet, Take 1 tablet by mouth 2 (Two) Times a Day. Once in AM and once in PM, Disp: , Rfl:     Chlorcyclizine-Pseudoephed (Stahist AD) 25-60 MG tablet, Take 1 tablet by mouth 2 (Two) Times a Day As Needed (sinus)., Disp: 60 tablet, Rfl: 11    Continuous Glucose Sensor (Dexcom G7 Sensor) misc, USE ONE EACHE EVERY 10 DAY, Disp: 3 each, Rfl: 5    Evolocumab (REPATHA) solution auto-injector SureClick injection, Inject 1 mL under the skin into the appropriate area as directed Every 14 (Fourteen) Days. Indications: Disease involving Lipid Deposits in the Arteries, Disp: 2 mL, Rfl: 11    fluticasone (FLONASE) 50 MCG/ACT nasal  "spray, Administer 1 spray into the nostril(s) as directed by provider As Needed., Disp: , Rfl:     glipizide (GLUCOTROL XL) 5 MG ER tablet, Take 1 tablet by mouth Daily for 180 days. Take at 10:00 am, Disp: 90 tablet, Rfl: 1    glucose blood (Accu-Chek Malaika Plus) test strip, USE TWICE DAILY WITH MEALS, Disp: 180 each, Rfl: 3    Januvia 100 MG tablet, Take 1 tablet by mouth Daily., Disp: 90 tablet, Rfl: 1    Jardiance 25 MG tablet tablet, TAKE ONE TABLET BY MOUTH EVERY DAY, Disp: 90 tablet, Rfl: 1    sildenafil (REVATIO) 20 MG tablet, Take 1-5 tablets 1 hour prior to intercourse as needed, Disp: 30 tablet, Rfl: 1    Tresiba FlexTouch 100 UNIT/ML solution pen-injector injection, Inject 51 Units under the skin into the appropriate area as directed Daily for 180 days., Disp: 45 mL, Rfl: 1    chlorthalidone (HYGROTON) 25 MG tablet, Take 1 tablet by mouth Daily., Disp: 90 tablet, Rfl: 1    ezetimibe (ZETIA) 10 MG tablet, Take 1 tablet by mouth Daily., Disp: 90 tablet, Rfl: 1    glipizide (GLUCOTROL XL) 10 MG 24 hr tablet, Take 1 tablet by mouth Daily for 180 days., Disp: 90 tablet, Rfl: 1    metFORMIN ER (GLUCOPHAGE-XR) 500 MG 24 hr tablet, Take 2 tablets by mouth Daily With Breakfast & Dinner for 180 days., Disp: 360 tablet, Rfl: 1    ofloxacin (Ocuflox) 0.3 % ophthalmic solution, Administer 2 drops to the right eye 4 (Four) Times a Day. (Patient not taking: Reported on 12/5/2024), Disp: 5 mL, Rfl: 0    rosuvastatin (CRESTOR) 40 MG tablet, Take 1 tablet by mouth Daily., Disp: 90 tablet, Rfl: 1    valsartan (DIOVAN) 40 MG tablet, Take 1 tablet by mouth Daily., Disp: 90 tablet, Rfl: 1.  He denies medication side effects.    All of the other chronic condition(s) listed above are stable w/o issues.    /76   Pulse 78   Temp 96 °F (35.6 °C)   Ht 177.8 cm (70\")   Wt 89 kg (196 lb 3.2 oz)   SpO2 94%   BMI 28.15 kg/m²     Results for orders placed or performed in visit on 11/21/24   POC Glucose    Collection Time: " 11/21/24  3:46 PM    Specimen: Blood   Result Value Ref Range    Glucose 141 (H) 70 - 99 mg/dL   POC Glycosylated Hemoglobin (Hb A1C)    Collection Time: 11/21/24  3:51 PM    Specimen: Blood   Result Value Ref Range    Hemoglobin A1C 7.3 (A) 4.5 - 5.7 %    Lot Number [id number]     Expiration Date 8/8/26              The following portions of the patient's history were reviewed and updated as appropriate: allergies, current medications, past family history, past medical history, past social history, past surgical history, and problem list.    Review of Systems   Constitutional:  Negative for activity change, chills and fever.   Respiratory:  Negative for cough.    Cardiovascular:  Negative for chest pain.   Psychiatric/Behavioral:  Negative for dysphoric mood.        Objective             Physical Exam  Vitals and nursing note reviewed.   Constitutional:       General: He is not in acute distress.     Appearance: He is well-developed.   Cardiovascular:      Rate and Rhythm: Normal rate and regular rhythm.   Pulmonary:      Effort: Pulmonary effort is normal.      Breath sounds: Normal breath sounds.   Neurological:      Mental Status: He is alert and oriented to person, place, and time.   Psychiatric:         Behavior: Behavior normal.         Thought Content: Thought content normal.     Labs from his endocrine office reviewed by me at today's visit.        Diagnoses and all orders for this visit:    1. Essential (primary) hypertension (Primary)  -     chlorthalidone (HYGROTON) 25 MG tablet; Take 1 tablet by mouth Daily.  Dispense: 90 tablet; Refill: 1  -     valsartan (DIOVAN) 40 MG tablet; Take 1 tablet by mouth Daily.  Dispense: 90 tablet; Refill: 1    2. Pure hypercholesterolemia  -     ezetimibe (ZETIA) 10 MG tablet; Take 1 tablet by mouth Daily.  Dispense: 90 tablet; Refill: 1  -     rosuvastatin (CRESTOR) 40 MG tablet; Take 1 tablet by mouth Daily.  Dispense: 90 tablet; Refill: 1    3. Flu vaccine need  -      Fluzone >6mos ([id number])

## 2025-01-07 RX ORDER — SITAGLIPTIN 100 MG/1
100 TABLET, FILM COATED ORAL DAILY
Qty: 90 TABLET | Refills: 1 | Status: SHIPPED | OUTPATIENT
Start: 2025-01-07 | End: 2025-01-08 | Stop reason: CLARIF

## 2025-01-08 ENCOUNTER — PRIOR AUTHORIZATION (OUTPATIENT)
Dept: DIABETES SERVICES | Facility: HOSPITAL | Age: 65
End: 2025-01-08
Payer: COMMERCIAL

## 2025-01-08 DIAGNOSIS — E11.65 TYPE 2 DIABETES MELLITUS WITH HYPERGLYCEMIA, WITH LONG-TERM CURRENT USE OF INSULIN: Primary | ICD-10-CM

## 2025-01-08 DIAGNOSIS — Z79.4 TYPE 2 DIABETES MELLITUS WITH HYPERGLYCEMIA, WITH LONG-TERM CURRENT USE OF INSULIN: Primary | ICD-10-CM

## 2025-01-08 RX ORDER — SITAGLIPTIN 100 MG/1
100 TABLET ORAL DAILY
Qty: 90 TABLET | Refills: 1 | Status: SHIPPED | OUTPATIENT
Start: 2025-01-08 | End: 2025-07-07

## 2025-01-09 ENCOUNTER — TELEPHONE (OUTPATIENT)
Dept: DIABETES SERVICES | Facility: HOSPITAL | Age: 65
End: 2025-01-09
Payer: COMMERCIAL

## 2025-01-09 DIAGNOSIS — E11.65 UNCONTROLLED TYPE 2 DIABETES MELLITUS WITH HYPERGLYCEMIA: ICD-10-CM

## 2025-01-09 RX ORDER — METFORMIN HYDROCHLORIDE 500 MG/1
TABLET, EXTENDED RELEASE ORAL
Qty: 360 TABLET | Refills: 1 | Status: SHIPPED | OUTPATIENT
Start: 2025-01-09

## 2025-01-09 NOTE — TELEPHONE ENCOUNTER
Called and spoke to Chemo Lin about requested medication change- pt stated januvia is not covered by insurance and would like an alternative. Did inform pt a P.A was submitted to insurance 1-8-25 and once we hear back we will consult with provider on next steps if not approved, whether its an appeal and/or an alternative. Pt verbalized understanding Pt would like a call back once P.A response is received.

## 2025-01-09 NOTE — TELEPHONE ENCOUNTER
Hub staff attempted to follow warm transfer process and was unsuccessful     Caller: Chemo Lin    Relationship to patient: Self    Best call back number: [phone]     Patient is needing: WANTS TO CHANGE JANUVIA MEDICATION

## 2025-01-10 ENCOUNTER — PATIENT ROUNDING (BHMG ONLY) (OUTPATIENT)
Age: 65
End: 2025-01-10
Payer: COMMERCIAL

## 2025-01-10 NOTE — ED NOTES
Thank you for letting us care for you in your recent visit to our urgent care center. We would love to hear about your experience with us. Was this the first time you have visited our location?    We’re always looking for ways to make our patients’ experiences even better. Do you have any recommendations on ways we may improve?     I appreciate you taking the time to respond. Please be on the lookout for a survey about your recent visit from [employer] via text or email. We would greatly appreciate if you could fill that out and turn it back in. We want your voice to be heard and we value your feedback.   Thank you for choosing [facility] for your healthcare needs.

## 2025-01-22 DIAGNOSIS — E11.65 UNCONTROLLED TYPE 2 DIABETES MELLITUS WITH HYPERGLYCEMIA: ICD-10-CM

## 2025-01-22 RX ORDER — GLIPIZIDE 10 MG/1
10 TABLET, FILM COATED, EXTENDED RELEASE ORAL DAILY
Qty: 90 TABLET | Refills: 1 | Status: SHIPPED | OUTPATIENT
Start: 2025-01-22 | End: 2025-07-21

## 2025-02-06 NOTE — PROGRESS NOTES
Chief Complaint  Diabetes (Med management, A1C, CGM Eval)    Referred By: Roly Barreto MD    Subjective          Patient or patient representative verbalized consent for the use of Ambient Listening during the visit with  VESNA Salter for chart documentation. 2/10/2025  16:27 MIKE Lin presents to [facility] for diabetes medication management    History of Present Illness    Visit type:  follow-up  Diabetes type:  Type 2  Current diabetes status/concerns/issues:     History of Present Illness  The patient presents for evaluation of diabetes mellitus.    He has been off Januvia since the beginning of this month. He reports that 2 of his Dexcom sensors have malfunctioned, prompting him to contact the company, which has since sent him 2 replacements. His current regimen includes glipizide XL 10 mg in the morning and 5 mg in the mid-morning, Jardiance 25 mg once daily, metformin 1000 mg twice daily, and Tresiba 48 units once daily. Despite his active lifestyle, he has observed elevated blood glucose levels. He is seeking nutritional guidance and has expressed interest in attending classes at [facility]. His last consultation with a dietitian was in 2022. His typical diet includes a sausage and egg sandwich for breakfast, with the top bread removed, and chicken legs and wedges for lunch. He occasionally consumes snacks when he experiences hunger pangs. He is considering incorporating turkey sticks into his diet.    He experienced a hemorrhage in his left eye, which was evaluated by an ophthalmologist who attributed it to a minor blood vessel rupture on the ocular surface. The potential cause was identified as his nightly aspirin intake, leading to a recommendation to reduce the frequency to every other day. He plans to discuss this with his cardiologist. He has discontinued the aspirin and is uncertain if the rupture was self-inflicted due to nocturnal  itching. He has a scheduled follow-up with his ophthalmologist in May 2025.        Current Diabetes symptoms:    Polyuria: No   Polydipsia: No   Polyphagia: No   Blurred vision: No   Excessive fatigue: No  Known Diabetes complications:  Neuropathy: None; Location: N/A  Renal: Stage II mild (GFR = 60-89 mL/min) and Microalbuminuria - NEGATIVE  Eyes: No Retinopathy reported on current eye exam; Location: N/A  Amputation/Wounds: None  GI: None  Cardiovascular: Hypertension, Hyperlipidemia, and CAD  ED: Patient Reported  Other: None  Hypoglycemia:  None reported at this time  Hypoglycemia Symptoms:  No hypoglycemia at this time  Current diabetes treatment:  Glipizide XL 10 mg in the a.m. in 5 mg in the mid morning, Jardiance 25 mg once a day, metformin 1000 mg twice a day, Tresiba 48 units each day.  Januvia 100 mg once a day (he has been without it for about a week)  Blood glucose device:  Dexcom CGM  Blood glucose monitoring frequency:  Continuous per CGM  Blood glucose range/average:  The 14-day sensor report shows an average glucose of 206 mg/dL, with 40% in target range ( mgdL), 35% in the high range (181-250 mg/dL), 25% in the very high range (>250 mg/dL), 0% in the low range (54-70 mg/dL) and 0% in the very low range (<54 mg/dL).   Glucose Source: Device Reviewed  Diet:  Limits high carb/sweet foods, Avoids sugary drinks  Activity/Exercise:   active with work    Past Medical History:   Diagnosis Date    Allergic rhinitis     Arthritis of back 11-15-21    Back xray from [facility]    Coronary artery disease     History of echocardiogram 04/15/2020    11/26/2019-Calculated EF = 63% Mild mitral valve regurgitation is present Mild tricuspid valve regurgitation is present.       History of echocardiogram 04/15/2020    History of stress test 04/15/2020    11/26/2019-Diaphragmatic attenuation artifact is present. Left ventricular ejection fraction is normal (Calculated EF = 64%). Myocardial perfusion imaging indicates  a normal myocardial perfusion study with no evidence of ischemia. Impressions are consistent with a low risk study. Findings consistent with an equivocal ECG stress test    Hyperlipidemia     Hypertension     type 2 diabetes mellitus, uncontrolled    Rotator cuff syndrome 12-23-21    MRI from [facility]    Tendinitis     Type 2 diabetes mellitus      Past Surgical History:   Procedure Laterality Date    CATARACT EXTRACTION, BILATERAL      CHOLECYSTECTOMY WITH INTRAOPERATIVE CHOLANGIOGRAM N/A 5/15/2023    Procedure: CHOLECYSTECTOMY LAPAROSCOPIC INTRAOPERATIVE CHOLANGIOGRAM;  Surgeon: Kedar Reyes MD;  Location: [facility] MAIN OR;  Service: General;  Laterality: N/A;    COLONOSCOPY      COLONOSCOPY N/A 8/23/2023    Procedure: COLONOSCOPY TO CECUM WITH COLD BX POLYPECTOMIES;  Surgeon: Kedar Reyes MD;  Location: [facility] ENDOSCOPY;  Service: General;  Laterality: N/A;  POLYPS    CORONARY ANGIOPLASTY WITH STENT PLACEMENT      10 years ago    FOOT SURGERY Right     great toe joint repair    SHOULDER ARTHROSCOPY W/ ROTATOR CUFF REPAIR Right 02/18/2022    Procedure: SHOULDER ARTHROSCOPY WITH ROTATOR CUFF REPAIR COMPLEX, OPEN BICEPS TENODESIS;  Surgeon: Kar Newman MD;  Location: [facility] OR;  Service: Orthopedics;  Laterality: Right;     Family History   Problem Relation Age of Onset    Hypertension Mother     Heart disease Father     Hypertension Father     Lung cancer Father     Cancer Father     Diabetes Father     Diabetes Paternal Grandmother     Diabetes Maternal Grandmother     Malig Hyperthermia Neg Hx      Social History     Socioeconomic History    Marital status:    Tobacco Use    Smoking status: Never     Passive exposure: Never    Smokeless tobacco: Never   Vaping Use    Vaping status: Never Used   Substance and Sexual Activity    Alcohol use: No    Drug use: Never    Sexual activity: Yes     Partners: Female     Birth control/protection: Surgical     No Known Allergies    Current Outpatient  Medications:     aspirin 81 MG tablet, Take 1 tablet by mouth Every Night., Disp: , Rfl:     BD Pen Needle Beth U/F 32G X 4 MM misc, Inject 1 each into the skin daily., Disp: 100 each, Rfl: 11    benzonatate (TESSALON) 200 MG capsule, Take 1 capsule by mouth 3 (Three) Times a Day As Needed for Cough., Disp: 20 capsule, Rfl: 0    cetirizine (zyrTEC) 5 MG tablet, Take 1 tablet by mouth 2 (Two) Times a Day. Once in AM and once in PM, Disp: , Rfl:     Chlorcyclizine-Pseudoephed (Stahist AD) 25-60 MG tablet, Take 1 tablet by mouth 2 (Two) Times a Day As Needed (sinus)., Disp: 60 tablet, Rfl: 11    chlorthalidone (HYGROTON) 25 MG tablet, Take 1 tablet by mouth Daily., Disp: 90 tablet, Rfl: 1    Continuous Glucose Sensor (Dexcom G7 Sensor) misc, Use 1 each Every 10 (Ten) Days., Disp: 9 each, Rfl: 1    empagliflozin (Jardiance) 25 MG tablet tablet, Take 1 tablet by mouth Daily., Disp: 90 tablet, Rfl: 1    Evolocumab (REPATHA) solution auto-injector SureClick injection, Inject 1 mL under the skin into the appropriate area as directed Every 14 (Fourteen) Days. Indications: Disease involving Lipid Deposits in the Arteries, Disp: 2 mL, Rfl: 11    ezetimibe (ZETIA) 10 MG tablet, Take 1 tablet by mouth Daily., Disp: 90 tablet, Rfl: 1    fluticasone (FLONASE) 50 MCG/ACT nasal spray, Administer 1 spray into the nostril(s) as directed by provider As Needed., Disp: , Rfl:     glipizide (GLUCOTROL XL) 10 MG 24 hr tablet, Take 1 tablet by mouth Daily for 180 days., Disp: 90 tablet, Rfl: 1    glucose blood (Accu-Chek Malaika Plus) test strip, USE TWICE DAILY WITH MEALS, Disp: 180 each, Rfl: 3    metFORMIN ER (GLUCOPHAGE-XR) 500 MG 24 hr tablet, Take 2 tablets by mouth 2 (Two) Times a Day., Disp: 360 tablet, Rfl: 1    rosuvastatin (CRESTOR) 40 MG tablet, Take 1 tablet by mouth Daily., Disp: 90 tablet, Rfl: 1    sildenafil (REVATIO) 20 MG tablet, Take 1-5 tablets 1 hour prior to intercourse as needed, Disp: 30 tablet, Rfl: 1    SITagliptin  "(Zituvio) 100 MG tablet, Take 100 mg by mouth Daily for 180 days., Disp: 90 tablet, Rfl: 1    Tresiba FlexTouch 100 UNIT/ML solution pen-injector injection, Inject 50 Units under the skin into the appropriate area as directed Daily for 188 days., Disp: 45 mL, Rfl: 1    valsartan (DIOVAN) 40 MG tablet, Take 1 tablet by mouth Daily., Disp: 90 tablet, Rfl: 1    Objective     Vitals:    02/07/25 1604   BP: 125/81   BP Location: Left arm   Patient Position: Sitting   Cuff Size: Adult   Pulse: 65   Temp: 98 °F (36.7 °C)   TempSrc: Temporal   SpO2: 95%   Weight: 89.4 kg (197 lb)   Height: 177.8 cm (70\")     Body mass index is 28.27 kg/m².    Physical Exam  Constitutional:       Appearance: Normal appearance.      Comments: Overweight (BMI 25 - 29.9) Pt Current BMI = 28.27    HENT:      Head: Normocephalic and atraumatic.      Right Ear: External ear normal.      Left Ear: External ear normal.      Nose: Nose normal.   Eyes:      Extraocular Movements: Extraocular movements intact.      Conjunctiva/sclera: Conjunctivae normal.   Pulmonary:      Effort: Pulmonary effort is normal.   Musculoskeletal:         General: Normal range of motion.      Cervical back: Normal range of motion.   Skin:     General: Skin is warm and dry.   Neurological:      General: No focal deficit present.      Mental Status: He is alert and oriented to person, place, and time. Mental status is at baseline.   Psychiatric:         Mood and Affect: Mood normal.         Behavior: Behavior normal.         Thought Content: Thought content normal.         Judgment: Judgment normal.             Result Review :   The following data was reviewed by: VESNA Salter on 02/07/2025:    Most Recent A1C          2/7/2025    16:18   HGBA1C Most Recent   Hemoglobin A1C 7.5        A1C Last 3 Results          8/29/2024    15:36 11/21/2024    15:51 2/7/2025    16:18   HGBA1C Last 3 Results   Hemoglobin A1C 6.8  7.3  7.5      A1c collected in the office today " is 7.5%, indicating Uncontrolled Type II diabetes.  This result is down from the prior result of 7.3% collected on 11/21/24     Glucose   Date Value Ref Range Status   02/07/2025 202 (H) 70 - 99 mg/dL Final     Comment:     Serial Number: [id number]:  [id number]     Point of care glucose in the office today is  elevated at 202 mg/dL              Diagnoses and all orders for this visit:    1. Uncontrolled type 2 diabetes mellitus with hyperglycemia (Primary)  -     POC Glycosylated Hemoglobin (Hb A1C)  -     POC Glucose  -     SITagliptin (Zituvio) 100 MG tablet; Take 100 mg by mouth Daily for 180 days.  Dispense: 90 tablet; Refill: 1  -     Continuous Glucose Sensor (Dexcom G7 Sensor) misc; Use 1 each Every 10 (Ten) Days.  Dispense: 9 each; Refill: 1  -     glipizide (GLUCOTROL XL) 10 MG 24 hr tablet; Take 1 tablet by mouth Daily for 180 days.  Dispense: 90 tablet; Refill: 1  -     empagliflozin (Jardiance) 25 MG tablet tablet; Take 1 tablet by mouth Daily.  Dispense: 90 tablet; Refill: 1  -     metFORMIN ER (GLUCOPHAGE-XR) 500 MG 24 hr tablet; Take 2 tablets by mouth 2 (Two) Times a Day.  Dispense: 360 tablet; Refill: 1  -     Tresiba FlexTouch 100 UNIT/ML solution pen-injector injection; Inject 50 Units under the skin into the appropriate area as directed Daily for 188 days.  Dispense: 45 mL; Refill: 1  -     Ambulatory Referral to [facility] [place]    2. Type 2 diabetes mellitus with hyperglycemia, with long-term current use of insulin    3. Type 2 diabetes mellitus with stage 2 chronic kidney disease, with long-term current use of insulin    4. Uses self-applied continuous glucose monitoring device    5. Overweight (BMI 25.0-29.9)        Assessment & Plan  1. Diabetes mellitus.  His sensor readings indicate an average glucose level of 206, with only 40 percent of the readings falling within the target range. His A1c level has increased from 7.3 to 7.5. He is advised to maintain a nutrition  journal for at least a week prior to his appointment with the dietitian. A prescription for Zituvio will be reissued. The dosage of glipizide will be increased to 20 mg, to be taken in the morning.  A prescription for Tresiba 50 units once daily will be provided. He is encouraged to bring a list of his snacks to the dietitian's appointment for evaluation. All prescriptions will be sent to [facility] in [place].    2. Left eye hemorrhage.  He experienced a hemorrhage in his left eye, which was evaluated by an ophthalmologist who attributed it to a minor blood vessel rupture on the ocular surface. The potential cause was identified as his nightly aspirin intake, leading to a recommendation to reduce the frequency to every other day. He is advised to consult with his cardiologist regarding the aspirin dosage. He is also requested to provide a copy of his eye exam report to our office.          The patient will monitor his blood glucose levels per continuous glucose monitor.  If he develops problematic hyperglycemia or hypoglycemia or adverse drug reactions, he will contact the office for further instructions.        Follow Up     Return in about 3 months (around 5/7/2025) for Medication Management, CGM Follow-up.    Patient was given instructions and counseling regarding his condition or for health maintenance advice. Please see specific information pulled into the AVS if appropriate.     Kelly Quintero, VESNA  02/07/2025        Dictated Utilizing Dragon Dictation.  Please note that portions of this note were completed with a voice recognition program.  Part of this note may be an electronic transcription/translation of spoken language to printed text using the Dragon Dictation System.

## 2025-02-07 ENCOUNTER — OFFICE VISIT (OUTPATIENT)
Dept: DIABETES SERVICES | Facility: HOSPITAL | Age: 65
End: 2025-02-07
Payer: COMMERCIAL

## 2025-02-07 VITALS
HEART RATE: 65 BPM | OXYGEN SATURATION: 95 % | BODY MASS INDEX: 28.2 KG/M2 | DIASTOLIC BLOOD PRESSURE: 81 MMHG | WEIGHT: 197 LBS | TEMPERATURE: 98 F | SYSTOLIC BLOOD PRESSURE: 125 MMHG | HEIGHT: 70 IN

## 2025-02-07 DIAGNOSIS — E66.3 OVERWEIGHT (BMI 25.0-29.9): ICD-10-CM

## 2025-02-07 DIAGNOSIS — Z79.4 TYPE 2 DIABETES MELLITUS WITH STAGE 2 CHRONIC KIDNEY DISEASE, WITH LONG-TERM CURRENT USE OF INSULIN: ICD-10-CM

## 2025-02-07 DIAGNOSIS — E11.22 TYPE 2 DIABETES MELLITUS WITH STAGE 2 CHRONIC KIDNEY DISEASE, WITH LONG-TERM CURRENT USE OF INSULIN: ICD-10-CM

## 2025-02-07 DIAGNOSIS — N18.2 TYPE 2 DIABETES MELLITUS WITH STAGE 2 CHRONIC KIDNEY DISEASE, WITH LONG-TERM CURRENT USE OF INSULIN: ICD-10-CM

## 2025-02-07 DIAGNOSIS — Z97.8 USES SELF-APPLIED CONTINUOUS GLUCOSE MONITORING DEVICE: ICD-10-CM

## 2025-02-07 DIAGNOSIS — Z79.4 TYPE 2 DIABETES MELLITUS WITH HYPERGLYCEMIA, WITH LONG-TERM CURRENT USE OF INSULIN: ICD-10-CM

## 2025-02-07 DIAGNOSIS — E11.65 TYPE 2 DIABETES MELLITUS WITH HYPERGLYCEMIA, WITH LONG-TERM CURRENT USE OF INSULIN: ICD-10-CM

## 2025-02-07 DIAGNOSIS — E11.65 UNCONTROLLED TYPE 2 DIABETES MELLITUS WITH HYPERGLYCEMIA: Primary | ICD-10-CM

## 2025-02-07 LAB
EXPIRATION DATE: ABNORMAL
GLUCOSE BLDC GLUCOMTR-MCNC: 202 MG/DL (ref 70–99)
HBA1C MFR BLD: 7.5 % (ref 4.5–5.7)
Lab: ABNORMAL

## 2025-02-07 PROCEDURE — 82948 REAGENT STRIP/BLOOD GLUCOSE: CPT | Performed by: NURSE PRACTITIONER

## 2025-02-07 PROCEDURE — 95251 CONT GLUC MNTR ANALYSIS I&R: CPT | Performed by: NURSE PRACTITIONER

## 2025-02-07 PROCEDURE — 99214 OFFICE O/P EST MOD 30 MIN: CPT | Performed by: NURSE PRACTITIONER

## 2025-02-07 PROCEDURE — G0463 HOSPITAL OUTPT CLINIC VISIT: HCPCS | Performed by: NURSE PRACTITIONER

## 2025-02-07 PROCEDURE — 83036 HEMOGLOBIN GLYCOSYLATED A1C: CPT | Performed by: NURSE PRACTITIONER

## 2025-02-07 RX ORDER — GLIPIZIDE 10 MG/1
10 TABLET, FILM COATED, EXTENDED RELEASE ORAL DAILY
Qty: 90 TABLET | Refills: 1 | Status: SHIPPED | OUTPATIENT
Start: 2025-02-07 | End: 2025-08-06

## 2025-02-07 RX ORDER — GLIPIZIDE 5 MG/1
5 TABLET, FILM COATED, EXTENDED RELEASE ORAL DAILY
Qty: 90 TABLET | Refills: 1 | Status: CANCELLED | OUTPATIENT
Start: 2025-02-07 | End: 2025-08-06

## 2025-02-07 RX ORDER — ACYCLOVIR 400 MG/1
1 TABLET ORAL
Qty: 9 EACH | Refills: 1 | Status: SHIPPED | OUTPATIENT
Start: 2025-02-07

## 2025-02-07 RX ORDER — SITAGLIPTIN 100 MG/1
100 TABLET ORAL DAILY
Qty: 90 TABLET | Refills: 1 | Status: SHIPPED | OUTPATIENT
Start: 2025-02-07 | End: 2025-08-06

## 2025-02-07 RX ORDER — INSULIN DEGLUDEC 100 U/ML
50 INJECTION, SOLUTION SUBCUTANEOUS DAILY
Qty: 45 ML | Refills: 1 | Status: SHIPPED | OUTPATIENT
Start: 2025-02-07 | End: 2025-08-14

## 2025-02-07 RX ORDER — METFORMIN HYDROCHLORIDE 500 MG/1
1000 TABLET, EXTENDED RELEASE ORAL 2 TIMES DAILY
Qty: 360 TABLET | Refills: 1 | Status: SHIPPED | OUTPATIENT
Start: 2025-02-07

## 2025-02-13 ENCOUNTER — PRIOR AUTHORIZATION (OUTPATIENT)
Dept: DIABETES SERVICES | Facility: HOSPITAL | Age: 65
End: 2025-02-13
Payer: COMMERCIAL

## 2025-02-13 DIAGNOSIS — E11.65 UNCONTROLLED TYPE 2 DIABETES MELLITUS WITH HYPERGLYCEMIA: ICD-10-CM

## 2025-02-13 RX ORDER — ACYCLOVIR 400 MG/1
TABLET ORAL
Qty: 3 EACH | Refills: 5 | OUTPATIENT
Start: 2025-02-13

## 2025-02-14 ENCOUNTER — NUTRITION (OUTPATIENT)
Dept: DIABETES SERVICES | Facility: HOSPITAL | Age: 65
End: 2025-02-14
Payer: COMMERCIAL

## 2025-02-14 DIAGNOSIS — E11.65 UNCONTROLLED TYPE 2 DIABETES MELLITUS WITH HYPERGLYCEMIA: Primary | ICD-10-CM

## 2025-02-14 PROCEDURE — 97802 MEDICAL NUTRITION INDIV IN: CPT | Performed by: DIETITIAN, REGISTERED

## 2025-03-17 ENCOUNTER — SPECIALTY PHARMACY (OUTPATIENT)
Dept: PHARMACY | Facility: TELEHEALTH | Age: 65
End: 2025-03-17
Payer: COMMERCIAL

## 2025-03-17 NOTE — PROGRESS NOTES
Specialty Pharmacy Patient Management Program  Initial Assessment     Chemo Lin is a 64 y.o. male with hyperlipidemia and enrolled in the Patient Management program offered by [facility]. An initial outreach was conducted, including assessment of therapy appropriateness and specialty medication education for Repatha. The patient was introduced to services offered by [facility], including: regular assessments, refill coordination, curbside pick-up or mail order delivery options, prior authorization maintenance, and financial assistance programs as applicable. The patient was also provided with contact information for the pharmacy team.     Insurance Coverage & Financial Support  [employer] + copay card      Relevant Past Medical History and Comorbidities  Relevant medical history and concomitant health conditions were discussed with the patient. The patient's chart has been reviewed for relevant past medical history and comorbid health conditions and updated as necessary.   Past Medical History:   Diagnosis Date    Allergic rhinitis     Arthritis of back 11-15-21    Back xray from [facility]    Coronary artery disease     History of echocardiogram 04/15/2020    11/26/2019-Calculated EF = 63% Mild mitral valve regurgitation is present Mild tricuspid valve regurgitation is present.       History of echocardiogram 04/15/2020    History of stress test 04/15/2020    11/26/2019-Diaphragmatic attenuation artifact is present. Left ventricular ejection fraction is normal (Calculated EF = 64%). Myocardial perfusion imaging indicates a normal myocardial perfusion study with no evidence of ischemia. Impressions are consistent with a low risk study. Findings consistent with an equivocal ECG stress test    Hyperlipidemia     Hypertension     type 2 diabetes mellitus, uncontrolled    Rotator cuff syndrome 12-23-21    MRI from [facility]    Tendinitis     Type 2 diabetes mellitus       Social History     Socioeconomic History    Marital status:    Tobacco Use    Smoking status: Never     Passive exposure: Never    Smokeless tobacco: Never   Vaping Use    Vaping status: Never Used   Substance and Sexual Activity    Alcohol use: No    Drug use: Never    Sexual activity: Yes     Partners: Female     Birth control/protection: Surgical     Problem list reviewed by Daily Pierson RPH on 3/17/2025 at  9:42 AM    Allergies  Known allergies and reactions were discussed with the patient. The patient's chart has been reviewed for allergy information and updated as necessary.   Patient has no known allergies.  Allergies reviewed by Daily Pierson RPH on 3/17/2025 at  9:42 AM    Current Medication List  This medication list has been reviewed with the patient and evaluated for any interactions or necessary modifications/recommendations, and updated to include all prescription medications, OTC medications, and supplements the patient is currently taking. This list reflects what is contained in the patient's profile, which has also been marked as reviewed to communicate to other providers it is the most up to date version of the patient's current medication therapy.     Current Outpatient Medications:     aspirin 81 MG tablet, Take 1 tablet by mouth Every Night., Disp: , Rfl:     BD Pen Needle Beth U/F 32G X 4 MM misc, Inject 1 each into the skin daily., Disp: 100 each, Rfl: 11    benzonatate (TESSALON) 200 MG capsule, Take 1 capsule by mouth 3 (Three) Times a Day As Needed for Cough., Disp: 20 capsule, Rfl: 0    cetirizine (zyrTEC) 5 MG tablet, Take 1 tablet by mouth 2 (Two) Times a Day. Once in AM and once in PM, Disp: , Rfl:     Chlorcyclizine-Pseudoephed (Stahist AD) 25-60 MG tablet, Take 1 tablet by mouth 2 (Two) Times a Day As Needed (sinus)., Disp: 60 tablet, Rfl: 11    chlorthalidone (HYGROTON) 25 MG tablet, Take 1 tablet by mouth Daily., Disp: 90 tablet, Rfl: 1    Continuous Glucose Sensor  (Dexcom G7 Sensor) misc, Use 1 each Every 10 (Ten) Days., Disp: 9 each, Rfl: 1    empagliflozin (Jardiance) 25 MG tablet tablet, Take 1 tablet by mouth Daily., Disp: 90 tablet, Rfl: 1    empagliflozin (Jardiance) 25 MG tablet tablet, Take 1 tablet by mouth Daily., Disp: 90 tablet, Rfl: 1    Evolocumab (REPATHA) solution auto-injector SureClick injection, Inject 1 mL under the skin into the appropriate area as directed Every 14 (Fourteen) Days. Indications: Disease involving Lipid Deposits in the Arteries, Disp: 2 mL, Rfl: 11    ezetimibe (ZETIA) 10 MG tablet, Take 1 tablet by mouth Daily., Disp: 90 tablet, Rfl: 1    fluticasone (FLONASE) 50 MCG/ACT nasal spray, Administer 1 spray into the nostril(s) as directed by provider As Needed., Disp: , Rfl:     glipizide (GLUCOTROL XL) 10 MG 24 hr tablet, Take 1 tablet by mouth daily., Disp: 90 tablet, Rfl: 1    glucose blood (Accu-Chek Malaika Plus) test strip, USE TWICE DAILY WITH MEALS, Disp: 180 each, Rfl: 3    metFORMIN ER (GLUCOPHAGE-XR) 500 MG 24 hr tablet, Take 2 tablets by mouth daily with breakfast and take 2 tablets by mouth nightly with dinner., Disp: 360 tablet, Rfl: 1    rosuvastatin (CRESTOR) 40 MG tablet, Take 1 tablet by mouth Daily., Disp: 90 tablet, Rfl: 1    sildenafil (REVATIO) 20 MG tablet, Take 1-5 tablets 1 hour prior to intercourse as needed, Disp: 30 tablet, Rfl: 1    SITagliptin (Januvia) 100 MG tablet, Take 1 tablet by mouth Daily., Disp: 90 tablet, Rfl: 1    SITagliptin (Zituvio) 100 MG tablet, Take 100 mg by mouth Daily for 180 days., Disp: 90 tablet, Rfl: 1    Tresiba FlexTouch 100 UNIT/ML solution pen-injector injection, Inject 50 Units under the skin into the appropriate area as directed Daily., Disp: 45 mL, Rfl: 1    valsartan (DIOVAN) 40 MG tablet, Take 1 tablet by mouth Daily., Disp: 90 tablet, Rfl: 1  Medicines reviewed by Daily Pierson [facility] on 3/17/2025 at  9:42 AM    Drug Interactions  none     Relevant Laboratory Values  Lab Results    Component Value Date    GLUCOSE 96 05/18/2024    CALCIUM 9.5 05/18/2024     05/18/2024    K 3.8 05/18/2024    CO2 28 05/18/2024     05/18/2024    BUN 24 05/18/2024    CREATININE 1.14 05/18/2024    BCR 21 05/18/2024    ANIONGAP 9.7 05/15/2023     Lab Results   Component Value Date    WBC 5.2 05/18/2024    HGB 15.6 05/18/2024    HCT 47.3 05/18/2024    MCV 90 05/18/2024     05/18/2024    INR 1.00 02/16/2022     Lab Value Review  The above lab values have been reviewed; the following specialty medication(s) dose adjustment(s) are recommended: none.    Initial Education Provided for Specialty Medication  The patient has been provided with the following education and any applicable administration techniques (i.e. self-injection) have been demonstrated for the therapies indicated. All questions and concerns have been addressed prior to the patient receiving the medication, and the patient has verbalized understanding of the education and any materials provided. Additional patient education shall be provided and documented upon request by the patient, provider or payer.      Repatha® (evolocumab)    Medication Expectations   Why am I taking this medication? You are taking Repatha to lower your “bad” cholesterol (LDL-C). This medication can be used in adults with high blood cholesterol including primary hyperlipidemia and familial hypercholesterolemia. Repatha can also be used to reduce the risk of heart attack, stroke, and certain chest pain conditions requiring hospitalization if you have a history of cardiovascular disease.     What should I expect while on this medication? You should expect to see your cholesterol improve over time. Specifically, you should see your LDL-C decrease.    How does the medication work? Repatha works by blocking a protein called PCSK9 that contributes to high levels of bad cholesterol and it helps increase your liver's ability to remove bad cholesterol from your blood.      How long will I be on this medication for? The amount of time you will be on this medication will be determined by your doctor based on your cholesterol and/or your risk of having a cardiac event. You will most likely be on this medication or another cholesterol medication throughout your lifetime. Do not abruptly stop this medication without talking to your doctor first.    How do I take this medication? Take as directed on your prescription label. Repatha is injected under the skin (subcutaneously) of your stomach, thigh, or upper arm. This medication is usually given one or twice a month. Use a different injection site in the same body region with each dose.    What are some possible side effects? The most common side effects of Repatha include redness, itching, swelling, or pain/tenderness at the injection site, symptoms of the common cold, and flu or flu-like symptoms.    What happens if I miss a dose? If a dose is missed, and it is within 7 days from the missed dose, administer evolocumab and resume the original dosing schedule. If it has been more than 7 days from the missed dose, for every 2 week dosing, wait until the next dose on the original schedule and, for once monthly dosing, administer the dose and start a new schedule based on this date.     Medication Safety   What are things I should warn my doctor immediately about? Talk to your doctor if you are pregnant, planning to become pregnant, or breastfeeding. Stop the medication and tell your doctor or seek emergency medical help if you notice any signs/symptoms of an allergic reaction (severe rash, redness, hives, severe itching, trouble breathing, or swelling of the face, lips, or tongue). Do not take Repatha if you have an allergy to evolocumab or any of the ingredients in Repatha.    What are things that I should be cautious of? Be cautious of any side effects from this medication. Talk to your doctor if any new ones develop or aren't getting  better.   What are some medications that can interact with this one? There are no known significant drug interactions with Repatha. Always tell your doctor or pharmacist immediately if you start taking any new medications, including over-the-counter medications, vitamins, and herbal supplements.      Medication Storage/Handling   How should I handle this medication? Do not shake or expose the pen to extreme heat or direct sunlight. Keep this medication out of reach of pets/children.    How does this medication need to be stored? Store unused pens in the refrigerator in the original carton to protect from light. Allow medication to warm at room temperature prior to administration. If needed, Repatha may be kept at room temperature in the original carton for up to 30 days. Do not freeze.    How should I dispose of this medication? The device is a single-dose disposable pen and should be discarded in a sharps container after use. If you do not own a sharps container, you may use a household container made of heavy-duty plastic with a tight-fitting lid that is leak resistant (e.g., heavty-duty plastic laundry detergent bottle).  If your doctor decides to stop this medication, take to your local police station for proper disposal. Some pharmacies also have take-back bins for medication drop-off.      Resources/Support   How can I remind myself to take this medication? You can download reminder apps to help you manage your refills. You may also set an alarm on your phone to remind you to take your dose.    Is financial support available?  [facility] can provide co-pay cards if you have commercial insurance or patient assistance if you have Medicare or no insurance.    Which vaccines are recommended for me? Talk to your doctor about these vaccines: Flu, Coronavirus (COVID-19), Pneumococcal (pneumonia), Tdap, Zoster (shingles)          Adherence, Self-Administration, and Current Therapy Problems  Adherence related to the  patient's specialty therapy was discussed with the patient. The Adherence segment of this outreach has been reviewed and updated.          Additional Barriers to Patient Self-Administration: None identified  Methods for Supporting Patient Self-Administration: n/a    Open Medication Therapy Problems  No medication therapy recommendations to display    Goals of Therapy   Goals Addressed Today        Specialty Pharmacy General Goal      LDL Goal < 70 mg/dL    Lab Results   Component Value Date     (H) 05/18/2024     (H) 10/21/2023     (H) 12/30/2019    LDL 99 11/19/2018     (H) 01/15/2018                     Reassessment Plan & Follow-Up  Medication Therapy Changes: Patient is not new to therapy, new to [facility] services.  Additional Plans, Therapy Recommendations, or Therapy Problems to Be Addressed: none   Pharmacist to perform regular reassessments no more than (6) months from the previous assessment.  Welcome information and patient satisfaction survey to be sent by retail team with patient's initial fill.  Care Coordinator to set up future refill outreaches, coordinate prescription delivery, and escalate clinical questions to pharmacist.     Attestation  I attest the patient was actively involved in and has agreed to the above plan of care. I attest that the initiated specialty medication(s) are appropriate for the patient based on my assessment. If the prescribed therapy is at any point deemed not appropriate based on the current or future assessments, a consultation will be initiated with the patient's specialty care provider to determine the best course of action. The revised plan of therapy will be documented along with any reassessments and/or additional patient education provided.     Electronically signed by Daily Pierson RPH, 03/17/25, 9:44 AM EDT.

## 2025-03-24 ENCOUNTER — PRIOR AUTHORIZATION (OUTPATIENT)
Dept: DIABETES SERVICES | Facility: HOSPITAL | Age: 65
End: 2025-03-24
Payer: COMMERCIAL

## 2025-03-25 NOTE — TELEPHONE ENCOUNTER
DEXCOM G7 APPROVED UNTIL 3/24/26    ENDOCRINOLOGY - SCAN - DEXCOM G7 APPROVAL LETTER (03/25/2025)    [FreeTextEntry1] : Leukocytosis, reactive (WBC 16.2 today) with a normal differential in a 37 y/o morbidly obese male with planned sleeve gastrectomy. BCCR-Abl is negative.\par \par Plan:\par -No further hematology workup required.\par Cleared from hematology for upcoming bariatric surgery.

## 2025-04-02 VITALS — HEIGHT: 70 IN | WEIGHT: 197 LBS | BODY MASS INDEX: 28.2 KG/M2

## 2025-04-02 NOTE — PROGRESS NOTES
"  Chemo Lin is a 64 y.o. male who presents to [facility] for nutrition consult r/t diagnosis of T2DM.  Chemo Lin is referred by VESNA Lynch.    Past Medical History:   Diagnosis Date    Allergic rhinitis     Arthritis of back 11-15-21    Back xray from [facility]    Coronary artery disease     History of echocardiogram 04/15/2020    11/26/2019-Calculated EF = 63% Mild mitral valve regurgitation is present Mild tricuspid valve regurgitation is present.       History of echocardiogram 04/15/2020    History of stress test 04/15/2020    11/26/2019-Diaphragmatic attenuation artifact is present. Left ventricular ejection fraction is normal (Calculated EF = 64%). Myocardial perfusion imaging indicates a normal myocardial perfusion study with no evidence of ischemia. Impressions are consistent with a low risk study. Findings consistent with an equivocal ECG stress test    Hyperlipidemia     Hypertension     type 2 diabetes mellitus, uncontrolled    Rotator cuff syndrome 12-23-21    MRI from [facility]    Tendinitis     Type 2 diabetes mellitus        Anthropometrics    177.8 cm (70\")  89.4 kg (197 lb)  28.27 kg/m²    Diabetes History    Diabetes History  What type of diabetes do you have?: Type 2  Current DM knowledge: good  Have you had diabetes education/teaching in the past?: yes  Do you test your blood sugar at home?: yes  Meter type: CGM    Education Preferences    Education Preferences  What areas of diabetes would you like to learn about?: diet information    Nutrition Information    Nutrition Information  Enter everything you can remember eating in the last 24 hours (1 day): breakfast- sausage egg sandwich; lunch- chicken tenders, chips; dinner- steak; snacks- oatmeal, pie, orange; beverages- diet soda  What is the biggest challenge you have with your diet?: Knowledge    Education Needs    DM Education Needs  Meter: Has own  Medication: Oral, Insulin  Healthy Eating: " RD consult, Reviewed meal plan, Basic meal plan provided  Motivation: Engaged  Teaching Method: Explanation, Discussion, Handouts  Patient Response: Verbalized understanding    DM Goals    DM Goals  Healthy Eating - Goal: Today  Being Active - Goal: Today  Taking Medication - Goal: Today  Monitoring - Goal: Today  Contact Plan: Follow-up medical care      Medications    Current Outpatient Medications   Medication    aspirin    BD Pen Needle Beth U/F    benzonatate    cetirizine    Stahist AD    chlorthalidone    Dexcom G7 Sensor    empagliflozin    Jardiance    Evolocumab    ezetimibe    fluticasone    glipizide    Accu-Chek Malaika Plus    metFORMIN ER    rosuvastatin    sildenafil    Januvia    SITagliptin    Tresiba FlexTouch    valsartan     Labs       Lab Results   Component Value Date    TRIG 140 05/18/2024    HDL 29 (L) 05/18/2024     (H) 05/18/2024 February 2025 A1c 7.5%    Nutrition counseling provided on carbohydrate counting, portion control, measuring and reading labels.  Discussed eating out and gave suggestions on controlling carbohydrate intake and making healthier food choices.     Meal Plan:     Total Carbohydrates per meal: 3-4 carb servings/meal, 3 meals/day  Lean protein with meals.  Limit added fats.  Snacks: 1 carbohydrate serving (</= 22 g) + 1 protein serving.     Daily exercise encouraged (as recommended by healthcare provider). Discussed the benefits of exercise in lowering blood glucose, blood pressure, cholesterol, stress and controlling body weight.     Advised to continue daily blood glucose monitoring to assist with understanding of factors affecting blood glucose and assist with management of diabetes.  Discussed and provided with target BG ranges.     Literature provided: Diabetes Nutrition Placemat, Choose Your Foods Booklet    Dietitian contact number provided.  Patient encouraged to call with questions or concerns.     Time spent with patient: 40 minutes    Annette SWENSON  AGUSTÍN Trinidad, LD  02/14/2025

## 2025-04-09 ENCOUNTER — SPECIALTY PHARMACY (OUTPATIENT)
Dept: PHARMACY | Facility: TELEHEALTH | Age: 65
End: 2025-04-09
Payer: COMMERCIAL

## 2025-04-09 NOTE — PROGRESS NOTES
Specialty Pharmacy Patient Management Program  Refill Outreach     Chemo was contacted today regarding refills of their medication(s).    Refill Questions      Flowsheet Row Most Recent Value   Changes to allergies? No   Changes to medications? No   New conditions or infections since last clinic visit No   Unplanned office visit, urgent care, ED, or hospital admission in the last 4 weeks  No   How does patient/caregiver feel medication is working? Very good   Financial problems or insurance changes  No   Since the previous refill, were any specialty medication doses or scheduled injections missed or delayed?  No   Does this patient require a clinical escalation to a pharmacist? No            Delivery Questions      Flowsheet Row Most Recent Value   Delivery method UPS   Delivery address verified with patient/caregiver? Yes   Delivery address Home   Number of medications in delivery 1   Medication(s) being filled and delivered Evolocumab (REPATHA)   Doses left of specialty medications 1-INJECTS ON 4/11   Copay verified? Yes   Copay amount $15   Copay form of payment Credit/debit on file   Delivery Date Selection 04/10/25   Signature Required No   Do you consent to receive electronic handouts?  Yes                 Follow-up: 21 day(s)     Gracie Whitley, Pharmacy Technician  4/9/2025  11:57 EDT

## 2025-04-24 ENCOUNTER — OFFICE VISIT (OUTPATIENT)
Age: 65
End: 2025-04-24
Payer: COMMERCIAL

## 2025-04-24 VITALS
WEIGHT: 196 LBS | HEART RATE: 75 BPM | SYSTOLIC BLOOD PRESSURE: 110 MMHG | DIASTOLIC BLOOD PRESSURE: 66 MMHG | HEIGHT: 70 IN | BODY MASS INDEX: 28.06 KG/M2

## 2025-04-24 DIAGNOSIS — I25.10 CORONARY ARTERY DISEASE INVOLVING NATIVE CORONARY ARTERY OF NATIVE HEART WITHOUT ANGINA PECTORIS: Primary | ICD-10-CM

## 2025-04-24 DIAGNOSIS — I10 ESSENTIAL HYPERTENSION: ICD-10-CM

## 2025-04-24 DIAGNOSIS — E78.5 DYSLIPIDEMIA: ICD-10-CM

## 2025-04-24 PROCEDURE — 93000 ELECTROCARDIOGRAM COMPLETE: CPT | Performed by: INTERNAL MEDICINE

## 2025-04-24 PROCEDURE — 99214 OFFICE O/P EST MOD 30 MIN: CPT | Performed by: INTERNAL MEDICINE

## 2025-04-24 NOTE — PROGRESS NOTES
Date of Office Visit: 25    Encounter Provider: Ganga Briseno MD  Place of Service: [facility] CARDIOLOGY  Patient Name: Chemo Lin  :1960    Chief Complaint   Patient presents with    Coronary Artery Disease    Follow-up     1 year       HPI: 64 y.o. male with a medical history of essential hypertension, hyperlipidemia, and coronary artery disease.  In , he underwent bare-metal stenting of the OM.  He has been feeling well.  He denies any chest pain, palpitations, edema, dizziness, or syncope.  He does report chronic and mild dyspnea on exertion that is unchanged.  His blood pressure is well-controlled.  Electrocardiogram looks to be unchanged from prior.  Since her last visit he has been stable.  He denies any worsening of his baseline mild dyspnea on exertion.  He has no chest pain.  His blood pressure and heart rate are well-controlled.  He has now taking Repatha and has a follow-up with primary care with lab work in about a month.    Past Medical History:   Diagnosis Date    Allergic rhinitis     Arthritis of back 11-15-21    Back xray from [facility]    Coronary artery disease     History of echocardiogram 04/15/2020    2019-Calculated EF = 63% Mild mitral valve regurgitation is present Mild tricuspid valve regurgitation is present.       History of echocardiogram 04/15/2020    History of stress test 04/15/2020    2019-Diaphragmatic attenuation artifact is present. Left ventricular ejection fraction is normal (Calculated EF = 64%). Myocardial perfusion imaging indicates a normal myocardial perfusion study with no evidence of ischemia. Impressions are consistent with a low risk study. Findings consistent with an equivocal ECG stress test    Hyperlipidemia     Hypertension     type 2 diabetes mellitus, uncontrolled    Rotator cuff syndrome 21    MRI from [facility]    Tendinitis     Testosterone deficiency     Type 2 diabetes mellitus         Past Surgical History:   Procedure Laterality Date    CATARACT EXTRACTION, BILATERAL      CHOLECYSTECTOMY WITH INTRAOPERATIVE CHOLANGIOGRAM N/A 5/15/2023    Procedure: CHOLECYSTECTOMY LAPAROSCOPIC INTRAOPERATIVE CHOLANGIOGRAM;  Surgeon: Kedar Reyes MD;  Location: [facility] MAIN OR;  Service: General;  Laterality: N/A;    COLONOSCOPY      COLONOSCOPY N/A 8/23/2023    Procedure: COLONOSCOPY TO CECUM WITH COLD BX POLYPECTOMIES;  Surgeon: Kedar Reyes MD;  Location: [facility] ENDOSCOPY;  Service: General;  Laterality: N/A;  POLYPS    CORONARY ANGIOPLASTY WITH STENT PLACEMENT      10 years ago    FOOT SURGERY Right     great toe joint repair    SHOULDER ARTHROSCOPY W/ ROTATOR CUFF REPAIR Right 02/18/2022    Procedure: SHOULDER ARTHROSCOPY WITH ROTATOR CUFF REPAIR COMPLEX, OPEN BICEPS TENODESIS;  Surgeon: Kar Newman MD;  Location: [facility] OR;  Service: Orthopedics;  Laterality: Right;       Social History     Socioeconomic History    Marital status:    Tobacco Use    Smoking status: Never     Passive exposure: Never    Smokeless tobacco: Never   Vaping Use    Vaping status: Never Used   Substance and Sexual Activity    Alcohol use: No    Drug use: Never    Sexual activity: Yes     Partners: Female     Birth control/protection: Surgical       Family History   Problem Relation Age of Onset    Hypertension Mother     Heart disease Father     Hypertension Father     Lung cancer Father     Cancer Father     Diabetes Father     Diabetes Paternal Grandmother     Diabetes Maternal Grandmother     Malig Hyperthermia Neg Hx        Review of Systems   Constitutional: Negative. Negative for fever and malaise/fatigue.   HENT:  Negative for nosebleeds and sore throat.    Eyes:  Negative for blurred vision and double vision.   Cardiovascular:  Positive for dyspnea on exertion. Negative for chest pain, claudication, leg swelling, orthopnea, palpitations, paroxysmal nocturnal dyspnea and syncope.   Respiratory:  Negative.  Negative for cough, shortness of breath and snoring.    Endocrine: Negative for cold intolerance, heat intolerance and polydipsia.   Hematologic/Lymphatic: Negative for bleeding problem.   Skin:  Negative for itching, poor wound healing and rash.   Musculoskeletal:  Negative for falls, joint pain, joint swelling, muscle weakness and myalgias.   Gastrointestinal:  Negative for abdominal pain, melena, nausea and vomiting.   Neurological:  Negative for dizziness, light-headedness, loss of balance, seizures, vertigo and weakness.   Psychiatric/Behavioral:  Negative for altered mental status and depression.        No Known Allergies      Current Outpatient Medications:     aspirin 81 MG tablet, Take 1 tablet by mouth Every Night., Disp: , Rfl:     BD Pen Needle Beth U/F 32G X 4 MM misc, Inject 1 each into the skin daily., Disp: 100 each, Rfl: 11    cetirizine (zyrTEC) 5 MG tablet, Take 1 tablet by mouth 2 (Two) Times a Day. Once in AM and once in PM, Disp: , Rfl:     Chlorcyclizine-Pseudoephed (Stahist AD) 25-60 MG tablet, Take 1 tablet by mouth 2 (Two) Times a Day As Needed (sinus)., Disp: 60 tablet, Rfl: 11    chlorthalidone (HYGROTON) 25 MG tablet, Take 1 tablet by mouth Daily., Disp: 90 tablet, Rfl: 1    Continuous Glucose Sensor (Dexcom G7 Sensor) misc, Use 1 each Every 10 (Ten) Days., Disp: 9 each, Rfl: 1    empagliflozin (Jardiance) 25 MG tablet tablet, Take 1 tablet by mouth Daily., Disp: 90 tablet, Rfl: 1    Evolocumab (REPATHA) solution auto-injector SureClick injection, Inject 1 mL under the skin into the appropriate area as directed Every 14 (Fourteen) Days. Indications: Disease involving Lipid Deposits in the Arteries, Disp: 2 mL, Rfl: 11    ezetimibe (ZETIA) 10 MG tablet, Take 1 tablet by mouth Daily., Disp: 90 tablet, Rfl: 1    fluticasone (FLONASE) 50 MCG/ACT nasal spray, Administer 1 spray into the nostril(s) as directed by provider As Needed., Disp: , Rfl:     glipizide (GLUCOTROL XL) 10 MG  "24 hr tablet, Take 1 tablet by mouth daily., Disp: 90 tablet, Rfl: 1    glucose blood (Accu-Chek Malaika Plus) test strip, USE TWICE DAILY WITH MEALS, Disp: 180 each, Rfl: 3    metFORMIN ER (GLUCOPHAGE-XR) 500 MG 24 hr tablet, Take 2 tablets by mouth daily with breakfast and take 2 tablets by mouth nightly with dinner., Disp: 360 tablet, Rfl: 1    rosuvastatin (CRESTOR) 40 MG tablet, Take 1 tablet by mouth Daily., Disp: 90 tablet, Rfl: 1    sildenafil (REVATIO) 20 MG tablet, Take 1-5 tablets 1 hour prior to intercourse as needed, Disp: 30 tablet, Rfl: 1    SITagliptin (Januvia) 100 MG tablet, Take 1 tablet by mouth Daily., Disp: 90 tablet, Rfl: 1    Tresiba FlexTouch 100 UNIT/ML solution pen-injector injection, Inject 50 Units under the skin into the appropriate area as directed Daily., Disp: 45 mL, Rfl: 1    valsartan (DIOVAN) 40 MG tablet, Take 1 tablet by mouth Daily., Disp: 90 tablet, Rfl: 1      Objective:     Vitals:    04/24/25 1502   BP: 110/66   Pulse: 75   Weight: 88.9 kg (196 lb)   Height: 177.8 cm (70\")     Body mass index is 28.12 kg/m².    PHYSICAL EXAM:    Constitutional:       Appearance: Well-developed.   Eyes:      General: No scleral icterus.     Conjunctiva/sclera: Conjunctivae normal.   HENT:      Head: Normocephalic and atraumatic.   Neck:      Thyroid: No thyromegaly.      Vascular: Normal carotid pulses. No carotid bruit, hepatojugular reflux or JVD.      Trachea: No tracheal deviation.   Pulmonary:      Effort: Pulmonary effort is normal. No respiratory distress.      Breath sounds: Normal breath sounds. No decreased breath sounds. No wheezing. No rhonchi. No rales.   Chest:      Chest wall: Not tender to palpatation.   Cardiovascular:      Normal rate. Regular rhythm.      Murmurs: There is no murmur.      No gallop.  No click. No rub.   Pulses:     Intact distal pulses.      Carotid: 2+ bilaterally.     Radial: 2+ bilaterally.     Femoral: 2+ bilaterally.     Dorsalis pedis: 2+ " bilaterally.     Posterior tibial: 2+ bilaterally.  Edema:     Peripheral edema absent.   Abdominal:      General: Bowel sounds are normal. There is no distension.      Palpations: Abdomen is soft.      Tenderness: There is no abdominal tenderness.   Musculoskeletal:         General: No deformity.      Cervical back: Normal range of motion and neck supple. Skin:     Findings: No erythema or rash.   Neurological:      Mental Status: Alert and oriented to person, place, and time.      Sensory: No sensory deficit.   Psychiatric:         Behavior: Behavior normal.           ECG 12 Lead    Date/Time: 4/24/2025 3:27 PM  Performed by: Ganga Briseno MD    Authorized by: Ganga Briseno MD  Comparison: compared with previous ECG from 4/18/2024  Similar to previous ECG  Rhythm: sinus rhythm  Rate: normal  QRS axis: normal    Clinical impression: normal ECG            Assessment:      Plan:       1.  Coronary artery disease.  He denies any symptoms of angina.    - Continue aspirin 81 mg p.o. daily.  He is currently tolerating this well with no recent bleeding complications  - Currently on Repatha, Crestor, and Zetia.  Depending on what his labs look like on the repeat we may be able to get him off the Zetia.    2.  Hypertension.  Blood pressure is currently well-controlled.  No change in current medical regimen.  Continue valsartan and chlorthalidone therapy at current dose    3.  Hyperlipidemia.  Continue current medical regimen.  Plan on repeat lab work with the primary care.  Continue Crestor, Zetia, and Repatha therapy at current dose.

## 2025-05-01 ENCOUNTER — SPECIALTY PHARMACY (OUTPATIENT)
Dept: PHARMACY | Facility: TELEHEALTH | Age: 65
End: 2025-05-01
Payer: COMMERCIAL

## 2025-05-01 NOTE — PROGRESS NOTES
Specialty Pharmacy Patient Management Program  Refill Outreach     Chemo was contacted today regarding refills of their medication(s).    Refill Questions      Flowsheet Row Most Recent Value   Changes to allergies? No   Changes to medications? No   New conditions or infections since last clinic visit No   Unplanned office visit, urgent care, ED, or hospital admission in the last 4 weeks  No   How does patient/caregiver feel medication is working? Very good   Financial problems or insurance changes  No   Since the previous refill, were any specialty medication doses or scheduled injections missed or delayed?  No   Does this patient require a clinical escalation to a pharmacist? No            Delivery Questions      Flowsheet Row Most Recent Value   Delivery method UPS   Delivery address verified with patient/caregiver? Yes   Delivery address Home   Number of medications in delivery 6   Medication(s) being filled and delivered Evolocumab (REPATHA), Valsartan (DIOVAN), Continuous Glucose Sensor (Dexcom G7 Sensor), glipiZIDE (GLUCOTROL XL), Empagliflozin (JARDIANCE), Insulin Degludec (Tresiba FlexTouch)   Doses left of specialty medications 1-injects 5/3   Copay verified? Yes   Copay amount 130 total for all $15 for repatha   Copay form of payment Credit/debit on file   Delivery Date Selection 05/02/25   Signature Required No   Do you consent to receive electronic handouts?  Yes                 Follow-up: 21 day(s)     Gracie Whitley, Pharmacy Technician  5/1/2025  10:49 EDT

## 2025-05-02 ENCOUNTER — OFFICE VISIT (OUTPATIENT)
Dept: DIABETES SERVICES | Facility: HOSPITAL | Age: 65
End: 2025-05-02
Payer: COMMERCIAL

## 2025-05-02 VITALS
SYSTOLIC BLOOD PRESSURE: 128 MMHG | OXYGEN SATURATION: 94 % | HEART RATE: 85 BPM | WEIGHT: 198 LBS | BODY MASS INDEX: 28.35 KG/M2 | DIASTOLIC BLOOD PRESSURE: 70 MMHG | HEIGHT: 70 IN

## 2025-05-02 DIAGNOSIS — Z79.4 TYPE 2 DIABETES MELLITUS WITH STAGE 2 CHRONIC KIDNEY DISEASE, WITH LONG-TERM CURRENT USE OF INSULIN: ICD-10-CM

## 2025-05-02 DIAGNOSIS — E11.65 UNCONTROLLED TYPE 2 DIABETES MELLITUS WITH HYPERGLYCEMIA: Primary | ICD-10-CM

## 2025-05-02 DIAGNOSIS — E11.22 TYPE 2 DIABETES MELLITUS WITH STAGE 2 CHRONIC KIDNEY DISEASE, WITH LONG-TERM CURRENT USE OF INSULIN: ICD-10-CM

## 2025-05-02 DIAGNOSIS — N18.2 TYPE 2 DIABETES MELLITUS WITH STAGE 2 CHRONIC KIDNEY DISEASE, WITH LONG-TERM CURRENT USE OF INSULIN: ICD-10-CM

## 2025-05-02 DIAGNOSIS — E66.3 OVERWEIGHT (BMI 25.0-29.9): ICD-10-CM

## 2025-05-02 DIAGNOSIS — Z97.8 USES SELF-APPLIED CONTINUOUS GLUCOSE MONITORING DEVICE: ICD-10-CM

## 2025-05-02 LAB
EXPIRATION DATE: ABNORMAL
GLUCOSE BLDC GLUCOMTR-MCNC: 281 MG/DL (ref 70–99)
HBA1C MFR BLD: 8 % (ref 4.5–5.7)
Lab: ABNORMAL

## 2025-05-02 PROCEDURE — 82948 REAGENT STRIP/BLOOD GLUCOSE: CPT | Performed by: NURSE PRACTITIONER

## 2025-05-02 PROCEDURE — 83036 HEMOGLOBIN GLYCOSYLATED A1C: CPT | Performed by: NURSE PRACTITIONER

## 2025-05-02 PROCEDURE — G0463 HOSPITAL OUTPT CLINIC VISIT: HCPCS | Performed by: NURSE PRACTITIONER

## 2025-05-02 RX ORDER — ACYCLOVIR 400 MG/1
1 TABLET ORAL
Qty: 9 EACH | Refills: 1 | Status: SHIPPED | OUTPATIENT
Start: 2025-05-02

## 2025-05-02 RX ORDER — SITAGLIPTIN 100 MG/1
1 TABLET ORAL DAILY
Qty: 90 TABLET | Refills: 1 | Status: SHIPPED | OUTPATIENT
Start: 2025-05-02

## 2025-05-02 RX ORDER — INSULIN DEGLUDEC 100 U/ML
48 INJECTION, SOLUTION SUBCUTANEOUS DAILY
Qty: 43 ML | Refills: 1 | Status: SHIPPED | OUTPATIENT
Start: 2025-05-02 | End: 2025-10-29

## 2025-05-02 RX ORDER — GLIPIZIDE 10 MG/1
20 TABLET, FILM COATED, EXTENDED RELEASE ORAL DAILY
Qty: 180 TABLET | Refills: 1 | Status: SHIPPED | OUTPATIENT
Start: 2025-05-02 | End: 2025-10-29

## 2025-05-02 NOTE — PATIENT INSTRUCTIONS
Take glipizide XL to 10 mg tablets once a day at around 1030 to 11 AM     Take Jardiance 25 mg once a day each morning    Take Zituvio (this was previously Januvia) 100 mg every morning    Take metformin 1000 mg twice a day    Take Tresiba 48 units every morning

## 2025-05-02 NOTE — PROGRESS NOTES
Chief Complaint  Diabetes (Med management, A1C, CGM Eval)    Referred By: Roly Barreto MD    Subjective          Patient or patient representative verbalized consent for the use of Ambient Listening during the visit with  VESNA Salter for chart documentation. 5/3/2025  16:24 EDT    Chemo Lin presents to [facility] for diabetes medication management    History of Present Illness    Visit type:  follow-up  Diabetes type:  Type 2  Current diabetes status/concerns/issues:     History of Present Illness  The patient presents for evaluation of diabetes.    The chief complaint is diabetes management. He is currently on a regimen of Tresiba 50 units daily, Jardiance 25 mg daily, and metformin 1000 mg twice daily. Additionally, he takes Zituvio 100 mg daily and glipizide XL 20 mg in the morning. Continuous glucose monitoring is performed using Dexcom. No recent illness or antibiotic use is reported. Dietary habits include minimal breakfast intake and regular dinner consumption. A consultation with his primary care physician is planned for next month for a comprehensive blood workup.        Current Diabetes symptoms:    Polyuria: No   Polydipsia: No   Polyphagia: No   Blurred vision: No   Excessive fatigue: No  Known Diabetes complications:  Neuropathy: None; Location: N/A  Renal: Stage II mild (GFR = 60-89 mL/min) and Microalbuminuria - NEGATIVE  Eyes: No Retinopathy reported on current eye exam; Location: N/A; Date of Last Exam: 6/19/24  Amputation/Wounds: None  GI: None  Cardiovascular: Hypertension, Hyperlipidemia, and CAD  ED: Patient Reported  Other: None  Hypoglycemia:  Level 2 (less than 54 mg/dL); Frequency - less than 1% per CGM  Hypoglycemia Symptoms:  shaking/tremors  Current diabetes treatment:  Glipizide XL 20 mg in the a.m., Jardiance 25 mg once a day, metformin 1000 mg twice a day, Tresiba 48 units each day.  Zituvio 100 mg once a day  Blood glucose  device:  Dexcom CGM  Blood glucose monitoring frequency:  Continuous per CGM  Blood glucose range/average:  The 14-day sensor report shows an average glucose of 188 mg/dL, with 53% in target range ( mgdL), 25% in the high range (181-250 mg/dL), 22% in the very high range (>250 mg/dL), 0% in the low range (54-70 mg/dL) and less than 1% in the very low range (<54 mg/dL).   Glucose Source: Device Reviewed  Diet:  Limits high carb/sweet foods, Avoids sugary drinks  Activity/Exercise:   active with work    Past Medical History:   Diagnosis Date    Allergic rhinitis     Arthritis of back 11-15-21    Back xray from [facility]    Coronary artery disease     History of echocardiogram 04/15/2020    11/26/2019-Calculated EF = 63% Mild mitral valve regurgitation is present Mild tricuspid valve regurgitation is present.       History of echocardiogram 04/15/2020    History of stress test 04/15/2020    11/26/2019-Diaphragmatic attenuation artifact is present. Left ventricular ejection fraction is normal (Calculated EF = 64%). Myocardial perfusion imaging indicates a normal myocardial perfusion study with no evidence of ischemia. Impressions are consistent with a low risk study. Findings consistent with an equivocal ECG stress test    Hyperlipidemia     Hypertension     type 2 diabetes mellitus, uncontrolled    Rotator cuff syndrome 12-23-21    MRI from [facility]    Tendinitis     Testosterone deficiency     Type 2 diabetes mellitus      Past Surgical History:   Procedure Laterality Date    CATARACT EXTRACTION, BILATERAL      CHOLECYSTECTOMY WITH INTRAOPERATIVE CHOLANGIOGRAM N/A 5/15/2023    Procedure: CHOLECYSTECTOMY LAPAROSCOPIC INTRAOPERATIVE CHOLANGIOGRAM;  Surgeon: Kedar Reyes MD;  Location: [facility] MAIN OR;  Service: General;  Laterality: N/A;    COLONOSCOPY      COLONOSCOPY N/A 8/23/2023    Procedure: COLONOSCOPY TO CECUM WITH COLD BX POLYPECTOMIES;  Surgeon: Kedar Reyes MD;  Location: [facility] ENDOSCOPY;  Service:  General;  Laterality: N/A;  POLYPS    CORONARY ANGIOPLASTY WITH STENT PLACEMENT      10 years ago    FOOT SURGERY Right     great toe joint repair    SHOULDER ARTHROSCOPY W/ ROTATOR CUFF REPAIR Right 02/18/2022    Procedure: SHOULDER ARTHROSCOPY WITH ROTATOR CUFF REPAIR COMPLEX, OPEN BICEPS TENODESIS;  Surgeon: Kar Newman MD;  Location: [facility];  Service: Orthopedics;  Laterality: Right;     Family History   Problem Relation Age of Onset    Hypertension Mother     Heart disease Father     Hypertension Father     Lung cancer Father     Cancer Father     Diabetes Father     Diabetes Paternal Grandmother     Diabetes Maternal Grandmother     Malig Hyperthermia Neg Hx      Social History     Socioeconomic History    Marital status:    Tobacco Use    Smoking status: Never     Passive exposure: Never    Smokeless tobacco: Never   Vaping Use    Vaping status: Never Used   Substance and Sexual Activity    Alcohol use: No    Drug use: Never    Sexual activity: Yes     Partners: Female     Birth control/protection: Surgical     No Known Allergies    Current Outpatient Medications:     aspirin 81 MG tablet, Take 1 tablet by mouth Every Night., Disp: , Rfl:     BD Pen Needle Beth U/F 32G X 4 MM misc, Inject 1 each into the skin daily., Disp: 100 each, Rfl: 11    cetirizine (zyrTEC) 5 MG tablet, Take 1 tablet by mouth 2 (Two) Times a Day. Once in AM and once in PM, Disp: , Rfl:     Chlorcyclizine-Pseudoephed (Stahist AD) 25-60 MG tablet, Take 1 tablet by mouth 2 (Two) Times a Day As Needed (sinus)., Disp: 60 tablet, Rfl: 11    chlorthalidone (HYGROTON) 25 MG tablet, Take 1 tablet by mouth Daily., Disp: 90 tablet, Rfl: 1    Continuous Glucose Sensor (Dexcom G7 Sensor) misc, Use 1 each Every 10 (Ten) Days., Disp: 9 each, Rfl: 1    empagliflozin (Jardiance) 25 MG tablet tablet, Take 1 tablet by mouth Daily., Disp: 90 tablet, Rfl: 1    Evolocumab (REPATHA) solution auto-injector SureClick injection, Inject 1 mL  "under the skin into the appropriate area as directed Every 14 (Fourteen) Days. Indications: Disease involving Lipid Deposits in the Arteries, Disp: 2 mL, Rfl: 11    ezetimibe (ZETIA) 10 MG tablet, Take 1 tablet by mouth Daily., Disp: 90 tablet, Rfl: 1    fluticasone (FLONASE) 50 MCG/ACT nasal spray, Administer 1 spray into the nostril(s) as directed by provider As Needed., Disp: , Rfl:     glipizide (GLUCOTROL XL) 10 MG 24 hr tablet, Take 2 tablets by mouth Daily for 180 days., Disp: 180 tablet, Rfl: 1    glucose blood (Accu-Chek Malaika Plus) test strip, USE TWICE DAILY WITH MEALS, Disp: 180 each, Rfl: 3    metFORMIN ER (GLUCOPHAGE-XR) 500 MG 24 hr tablet, Take 2 tablets by mouth daily with breakfast and take 2 tablets by mouth nightly with dinner., Disp: 360 tablet, Rfl: 1    rosuvastatin (CRESTOR) 40 MG tablet, Take 1 tablet by mouth Daily., Disp: 90 tablet, Rfl: 1    Tresiba FlexTouch 100 UNIT/ML solution pen-injector injection, Inject 48 Units under the skin into the appropriate area as directed Daily for 180 days., Disp: 43 mL, Rfl: 1    valsartan (DIOVAN) 40 MG tablet, Take 1 tablet by mouth Daily., Disp: 90 tablet, Rfl: 1    sildenafil (REVATIO) 20 MG tablet, Take 1-5 tablets 1 hour prior to intercourse as needed (Patient not taking: Reported on 5/2/2025), Disp: 30 tablet, Rfl: 1    SITagliptin (Zituvio) 100 MG tablet, Take 1 tablet by mouth Daily., Disp: 90 tablet, Rfl: 1    Objective     Vitals:    05/02/25 1546   BP: 128/70   BP Location: Right arm   Patient Position: Sitting   Cuff Size: Adult   Pulse: 85   SpO2: 94%   Weight: 89.8 kg (198 lb)   Height: 177.8 cm (70\")     Body mass index is 28.41 kg/m².    Physical Exam  Constitutional:       Appearance: Normal appearance.      Comments: Overweight (BMI 25 - 29.9) Pt Current BMI = 28.41   HENT:      Head: Normocephalic and atraumatic.      Right Ear: External ear normal.      Left Ear: External ear normal.      Nose: Nose normal.   Eyes:      Extraocular " Movements: Extraocular movements intact.      Conjunctiva/sclera: Conjunctivae normal.   Pulmonary:      Effort: Pulmonary effort is normal.   Musculoskeletal:         General: Normal range of motion.      Cervical back: Normal range of motion.   Skin:     General: Skin is warm and dry.   Neurological:      General: No focal deficit present.      Mental Status: He is alert and oriented to person, place, and time. Mental status is at baseline.   Psychiatric:         Mood and Affect: Mood normal.         Behavior: Behavior normal.         Thought Content: Thought content normal.         Judgment: Judgment normal.             Result Review :   The following data was reviewed by: VESNA Salter on 05/02/2025:    Most Recent A1C          5/2/2025    16:00   HGBA1C Most Recent   Hemoglobin A1C 8.0        A1C Last 3 Results          11/21/2024    15:51 2/7/2025    16:18 5/2/2025    16:00   HGBA1C Last 3 Results   Hemoglobin A1C 7.3  7.5  8.0      A1c collected in the office today is 8%, indicating Uncontrolled Type II diabetes.  This result is up from the prior result of 7.5% collected on 2/7/2025    Glucose   Date Value Ref Range Status   05/02/2025 281 (H) 70 - 99 mg/dL Final     Comment:     Serial Number: [id number]:  [id number]     Point of care glucose in the office today is  elevated at 281 mg/dL                Diagnoses and all orders for this visit:    1. Uncontrolled type 2 diabetes mellitus with hyperglycemia (Primary)  -     POC Glycosylated Hemoglobin (Hb A1C)  -     POC Glucose  -     SITagliptin (Zituvio) 100 MG tablet; Take 1 tablet by mouth Daily.  Dispense: 90 tablet; Refill: 1  -     glipizide (GLUCOTROL XL) 10 MG 24 hr tablet; Take 2 tablets by mouth Daily for 180 days.  Dispense: 180 tablet; Refill: 1  -     Continuous Glucose Sensor (Dexcom G7 Sensor) misc; Use 1 each Every 10 (Ten) Days.  Dispense: 9 each; Refill: 1  -     empagliflozin (Jardiance) 25 MG tablet tablet; Take 1 tablet  by mouth Daily.  Dispense: 90 tablet; Refill: 1  -     Tresiba FlexTouch 100 UNIT/ML solution pen-injector injection; Inject 48 Units under the skin into the appropriate area as directed Daily for 180 days.  Dispense: 43 mL; Refill: 1    2. Type 2 diabetes mellitus with stage 2 chronic kidney disease, with long-term current use of insulin    3. Uses self-applied continuous glucose monitoring device    4. Overweight (BMI 25.0-29.9)        Assessment & Plan  1. Diabetes mellitus:  - A1c levels have increased to 8.0 from 7.5 in 02/2025.  - Sensor report indicates an improvement in glucose control, with an average glucose level of 188 and 53% of readings within the target range, compared to the previous average of 206 and 40% of readings within the target range.  - Discussion about the unclear reason for elevated A1c despite improved sensor readings; glucose levels are highest in the afternoon.  - Advised to take glipizide 20 mg at 10:00 or 11:00 AM instead of early morning to better manage afternoon glucose levels. Refills for glipizide, Zituvio, Jardiance, Tresiba, and sensors will be provided and sent to [facility].          The patient will monitor his blood glucose levels per continuous glucose monitor.  If he develops problematic hyperglycemia or hypoglycemia or adverse drug reactions, he will contact the office for further instructions.        Follow Up     Return in about 3 months (around 8/2/2025) for Medication Management, CGM Follow-up.    Patient was given instructions and counseling regarding his condition or for health maintenance advice. Please see specific information pulled into the AVS if appropriate.     Kelly Quintero, VESNA  05/02/2025        Dictated Utilizing Dragon Dictation.  Please note that portions of this note were completed with a voice recognition program.  Part of this note may be an electronic transcription/translation of spoken language to printed text using the  [facility] Dictation System.

## 2025-05-09 DIAGNOSIS — E11.65 UNCONTROLLED TYPE 2 DIABETES MELLITUS WITH HYPERGLYCEMIA: ICD-10-CM

## 2025-05-09 RX ORDER — GLIPIZIDE 5 MG/1
5 TABLET, FILM COATED, EXTENDED RELEASE ORAL DAILY
Qty: 90 TABLET | Refills: 1 | OUTPATIENT
Start: 2025-05-09 | End: 2025-11-05

## 2025-05-19 DIAGNOSIS — E78.5 DYSLIPIDEMIA: ICD-10-CM

## 2025-05-19 DIAGNOSIS — I25.10 CORONARY ARTERY DISEASE INVOLVING NATIVE CORONARY ARTERY OF NATIVE HEART WITHOUT ANGINA PECTORIS: ICD-10-CM

## 2025-05-20 ENCOUNTER — SPECIALTY PHARMACY (OUTPATIENT)
Dept: PHARMACY | Facility: TELEHEALTH | Age: 65
End: 2025-05-20
Payer: COMMERCIAL

## 2025-05-20 RX ORDER — EVOLOCUMAB 140 MG/ML
140 INJECTION, SOLUTION SUBCUTANEOUS
Qty: 2 ML | Refills: 11 | Status: SHIPPED | OUTPATIENT
Start: 2025-05-20

## 2025-05-20 NOTE — TELEPHONE ENCOUNTER
I will refill. However he is due for a repeat lipid panel. It looks like he is planning to have this at his upcoming appt with his PCP.

## 2025-05-27 DIAGNOSIS — Z00.00 ANNUAL PHYSICAL EXAM: Primary | ICD-10-CM

## 2025-05-30 ENCOUNTER — TELEPHONE (OUTPATIENT)
Dept: FAMILY MEDICINE CLINIC | Facility: CLINIC | Age: 65
End: 2025-05-30
Payer: COMMERCIAL

## 2025-05-30 NOTE — TELEPHONE ENCOUNTER
Hub staff attempted to follow warm transfer process and was unsuccessful     Caller: Chemo Lin    Relationship to patient: Self    Best call back number: [phone]    Patient is needing: [facility]ERE TOLD THAT THEY WOULD DIFFERENT CODES SENT ON THE LAB ORDERS IN ORDER FOR THEIR INSURANCE TO PAY FOR THEM. PLEASE CALL AND ADVISE

## 2025-05-30 NOTE — TELEPHONE ENCOUNTER
Was able to speak with . Pt has new insurance thus new ICD 10 are needed. Info was provided.  Pt also instructed to bring new insurance care to office r/t change in appt type. Pt verbalized understanding.

## 2025-05-31 LAB
ALBUMIN SERPL-MCNC: 4.4 G/DL (ref 3.9–4.9)
ALP SERPL-CCNC: 72 IU/L (ref 44–121)
ALT SERPL-CCNC: 47 IU/L (ref 0–44)
AST SERPL-CCNC: 29 IU/L (ref 0–40)
BASOPHILS # BLD AUTO: 0.1 X10E3/UL (ref 0–0.2)
BASOPHILS NFR BLD AUTO: 1 %
BILIRUB SERPL-MCNC: 0.5 MG/DL (ref 0–1.2)
BUN SERPL-MCNC: 17 MG/DL (ref 8–27)
BUN/CREAT SERPL: 15 (ref 10–24)
CALCIUM SERPL-MCNC: 9.6 MG/DL (ref 8.6–10.2)
CHLORIDE SERPL-SCNC: 104 MMOL/L (ref 96–106)
CHOLEST SERPL-MCNC: 67 MG/DL (ref 100–199)
CO2 SERPL-SCNC: 23 MMOL/L (ref 20–29)
CREAT SERPL-MCNC: 1.1 MG/DL (ref 0.76–1.27)
EGFRCR SERPLBLD CKD-EPI 2021: 74 ML/MIN/1.73
EOSINOPHIL # BLD AUTO: 0.5 X10E3/UL (ref 0–0.4)
EOSINOPHIL NFR BLD AUTO: 8 %
ERYTHROCYTE [DISTWIDTH] IN BLOOD BY AUTOMATED COUNT: 12.3 % (ref 11.6–15.4)
GLOBULIN SER CALC-MCNC: 2.3 G/DL (ref 1.5–4.5)
GLUCOSE SERPL-MCNC: 107 MG/DL (ref 70–99)
HCT VFR BLD AUTO: 47.1 % (ref 37.5–51)
HDLC SERPL-MCNC: 31 MG/DL
HGB BLD-MCNC: 15.7 G/DL (ref 13–17.7)
IMM GRANULOCYTES # BLD AUTO: 0 X10E3/UL (ref 0–0.1)
IMM GRANULOCYTES NFR BLD AUTO: 0 %
LDLC SERPL CALC-MCNC: 19 MG/DL (ref 0–99)
LYMPHOCYTES # BLD AUTO: 1.5 X10E3/UL (ref 0.7–3.1)
LYMPHOCYTES NFR BLD AUTO: 26 %
MCH RBC QN AUTO: 30.8 PG (ref 26.6–33)
MCHC RBC AUTO-ENTMCNC: 33.3 G/DL (ref 31.5–35.7)
MCV RBC AUTO: 92 FL (ref 79–97)
MONOCYTES # BLD AUTO: 0.6 X10E3/UL (ref 0.1–0.9)
MONOCYTES NFR BLD AUTO: 11 %
NEUTROPHILS # BLD AUTO: 3.1 X10E3/UL (ref 1.4–7)
NEUTROPHILS NFR BLD AUTO: 54 %
PLATELET # BLD AUTO: 208 X10E3/UL (ref 150–450)
POTASSIUM SERPL-SCNC: 4.1 MMOL/L (ref 3.5–5.2)
PROT SERPL-MCNC: 6.7 G/DL (ref 6–8.5)
PSA SERPL-MCNC: 0.8 NG/ML (ref 0–4)
RBC # BLD AUTO: 5.1 X10E6/UL (ref 4.14–5.8)
SODIUM SERPL-SCNC: 144 MMOL/L (ref 134–144)
TRIGL SERPL-MCNC: 82 MG/DL (ref 0–149)
TSH SERPL DL<=0.005 MIU/L-ACNC: 2.08 UIU/ML (ref 0.45–4.5)
VLDLC SERPL CALC-MCNC: 17 MG/DL (ref 5–40)
WBC # BLD AUTO: 5.8 X10E3/UL (ref 3.4–10.8)

## 2025-06-05 ENCOUNTER — OFFICE VISIT (OUTPATIENT)
Dept: FAMILY MEDICINE CLINIC | Facility: CLINIC | Age: 65
End: 2025-06-05
Payer: MEDICARE

## 2025-06-05 VITALS
BODY MASS INDEX: 28.03 KG/M2 | SYSTOLIC BLOOD PRESSURE: 114 MMHG | HEIGHT: 70 IN | HEART RATE: 76 BPM | WEIGHT: 195.8 LBS | DIASTOLIC BLOOD PRESSURE: 62 MMHG | OXYGEN SATURATION: 94 % | TEMPERATURE: 97.9 F | RESPIRATION RATE: 16 BRPM

## 2025-06-05 DIAGNOSIS — I10 ESSENTIAL (PRIMARY) HYPERTENSION: Chronic | ICD-10-CM

## 2025-06-05 DIAGNOSIS — Z00.00 WELCOME TO MEDICARE PREVENTIVE VISIT: Primary | ICD-10-CM

## 2025-06-05 DIAGNOSIS — Z79.4 CONTROLLED TYPE 2 DIABETES MELLITUS WITHOUT COMPLICATION, WITH LONG-TERM CURRENT USE OF INSULIN: Chronic | ICD-10-CM

## 2025-06-05 DIAGNOSIS — E78.00 PURE HYPERCHOLESTEROLEMIA: Chronic | ICD-10-CM

## 2025-06-05 DIAGNOSIS — E11.9 CONTROLLED TYPE 2 DIABETES MELLITUS WITHOUT COMPLICATION, WITH LONG-TERM CURRENT USE OF INSULIN: Chronic | ICD-10-CM

## 2025-06-05 RX ORDER — CHLORTHALIDONE 25 MG/1
25 TABLET ORAL DAILY
Qty: 90 TABLET | Refills: 3 | Status: SHIPPED | OUTPATIENT
Start: 2025-06-05

## 2025-06-05 RX ORDER — EZETIMIBE 10 MG/1
10 TABLET ORAL DAILY
Qty: 90 TABLET | Refills: 3 | Status: SHIPPED | OUTPATIENT
Start: 2025-06-05

## 2025-06-05 RX ORDER — ROSUVASTATIN CALCIUM 40 MG/1
40 TABLET, COATED ORAL DAILY
Qty: 90 TABLET | Refills: 1 | Status: SHIPPED | OUTPATIENT
Start: 2025-06-05 | End: 2025-06-05 | Stop reason: SDUPTHER

## 2025-06-05 RX ORDER — CHLORTHALIDONE 25 MG/1
25 TABLET ORAL DAILY
Qty: 90 TABLET | Refills: 1 | Status: SHIPPED | OUTPATIENT
Start: 2025-06-05 | End: 2025-06-05 | Stop reason: SDUPTHER

## 2025-06-05 RX ORDER — VALSARTAN 40 MG/1
40 TABLET ORAL DAILY
Qty: 90 TABLET | Refills: 1 | Status: SHIPPED | OUTPATIENT
Start: 2025-06-05 | End: 2025-06-05 | Stop reason: SDUPTHER

## 2025-06-05 RX ORDER — VALSARTAN 40 MG/1
40 TABLET ORAL DAILY
Qty: 90 TABLET | Refills: 3 | Status: SHIPPED | OUTPATIENT
Start: 2025-06-05

## 2025-06-05 RX ORDER — EZETIMIBE 10 MG/1
10 TABLET ORAL DAILY
Qty: 90 TABLET | Refills: 1 | Status: SHIPPED | OUTPATIENT
Start: 2025-06-05 | End: 2025-06-05 | Stop reason: SDUPTHER

## 2025-06-05 RX ORDER — ROSUVASTATIN CALCIUM 40 MG/1
40 TABLET, COATED ORAL DAILY
Qty: 90 TABLET | Refills: 3 | Status: SHIPPED | OUTPATIENT
Start: 2025-06-05

## 2025-06-05 NOTE — PROGRESS NOTES
Subjective   The ABCs of the Annual Wellness Visit  Medicare Wellness Visit      Chemo Lin is a 65 y.o. patient who presents for a Medicare Wellness Visit.    The following portions of the patient's history were reviewed and   updated as appropriate: allergies, current medications, past family history, past medical history, past social history, past surgical history, and problem list.    Compared to one year ago, the patient's physical   health is the same.  Compared to one year ago, the patient's mental   health is the same.    Recent Hospitalizations:  He was not admitted to the hospital during the last year.     Current Medical Providers:  Patient Care Team:  Roly Barreto MD as PCP - General (Family Medicine)  Annalee Benson APRN (Family Medicine)  Cedric Gurrola MD as Consulting Physician (Cardiology)  Kar Newman MD as Surgeon (Orthopedic Surgery)  Kelly Quintero APRN as Nurse Practitioner (Endocrinology)  Yoly Hall APRN as Nurse Practitioner (Urology)    Outpatient Medications Prior to Visit   Medication Sig Dispense Refill    chlorthalidone (HYGROTON) 25 MG tablet Take 1 tablet by mouth Daily. 90 tablet 1    ezetimibe (ZETIA) 10 MG tablet Take 1 tablet by mouth Daily. 90 tablet 1    rosuvastatin (CRESTOR) 40 MG tablet Take 1 tablet by mouth Daily. 90 tablet 1    valsartan (DIOVAN) 40 MG tablet Take 1 tablet by mouth Daily. 90 tablet 1    aspirin 81 MG tablet Take 1 tablet by mouth Every Night.      BD Pen Needle Beth U/F 32G X 4 MM misc Inject 1 each into the skin daily. 100 each 11    cetirizine (zyrTEC) 5 MG tablet Take 1 tablet by mouth 2 (Two) Times a Day. Once in AM and once in PM      Chlorcyclizine-Pseudoephed (Stahist AD) 25-60 MG tablet Take 1 tablet by mouth 2 (Two) Times a Day As Needed (sinus). 60 tablet 11    Continuous Glucose Sensor (Dexcom G7 Sensor) misc Change Every 10 (Ten) Days. 9 each 1    empagliflozin (Jardiance) 25 MG tablet tablet Take  1 tablet by mouth Daily. 90 tablet 1    Evolocumab (Repatha SureClick) solution auto-injector SureClick injection Inject 1 mL under the skin into the appropriate area as directed Every 14 (Fourteen) Days. Indications: Disease involving Lipid Deposits in the Arteries 2 mL 11    fluticasone (FLONASE) 50 MCG/ACT nasal spray Administer 1 spray into the nostril(s) as directed by provider As Needed.      glipizide (GLUCOTROL XL) 10 MG 24 hr tablet Take 2 tablets by mouth Daily for 180 days. 180 tablet 1    glucose blood (Accu-Chek Malaika Plus) test strip USE TWICE DAILY WITH MEALS 180 each 3    metFORMIN ER (GLUCOPHAGE-XR) 500 MG 24 hr tablet Take 2 tablets by mouth daily with breakfast and take 2 tablets by mouth nightly with dinner. 360 tablet 1    sildenafil (REVATIO) 20 MG tablet Take 1-5 tablets 1 hour prior to intercourse as needed (Patient not taking: Reported on 5/2/2025) 30 tablet 1    SITagliptin (Zituvio) 100 MG tablet Take 1 tablet by mouth Daily. 90 tablet 1    Tresiba FlexTouch 100 UNIT/ML solution pen-injector injection Inject 48 Units under the skin into the appropriate area as directed Daily for 180 days. 43 mL 1     No facility-administered medications prior to visit.     No opioid medication identified on active medication list. I have reviewed chart for other potential  high risk medication/s and harmful drug interactions in the elderly.      Aspirin is on active medication list. Aspirin use is indicated based on review of current medical condition/s. Pros and cons of this therapy have been discussed today. Benefits of this medication outweigh potential harm.  Patient has been encouraged to continue taking this medication.  .      Patient Active Problem List   Diagnosis    Hyperuricemia    Low testosterone    Essential hypertension    Vitamin D deficiency    Dyslipidemia    Diabetes mellitus type 2, controlled, without complications    ED (erectile dysfunction)    CAD (coronary artery disease)     "History of coronary artery stent placement    Respiratory insufficiency    Family history of premature coronary heart disease    Hand arthritis    History of stress test    History of echocardiogram    Complete tear of right rotator cuff    Subacromial impingement of right shoulder    Biceps tendinitis of right upper extremity    Status post right shoulder arthroscopic rotator cuff repair with use of [employer] bio inductive implant, open biceps tenodesis DOS 02/18/2022    RUQ pain    Screen for colon cancer     Advance Care Planning Advance Directive is not on file.  ACP discussion was held with the patient during this visit. Patient does not have an advance directive, declines further assistance.            Objective   Vitals:    06/05/25 1518   BP: 114/62   Pulse: 76   Resp: 16   Temp: 97.9 °F (36.6 °C)   TempSrc: Oral   SpO2: 94%   Weight: 88.8 kg (195 lb 12.8 oz)   Height: 177.8 cm (70\")   PainSc: 0-No pain       Estimated body mass index is 28.09 kg/m² as calculated from the following:    Height as of this encounter: 177.8 cm (70\").    Weight as of this encounter: 88.8 kg (195 lb 12.8 oz).                Gait and Balance Evaluation:  Normal    Does the patient have evidence of cognitive impairment? No  Lab Results   Component Value Date    CHLPL 67 (L) 05/30/2025    TRIG 82 05/30/2025    HDL 31 (L) 05/30/2025    LDL 19 05/30/2025    VLDL 17 05/30/2025    HGBA1C 8.0 (A) 05/02/2025                                                                                                Health  Risk Assessment    Smoking Status:  Social History     Tobacco Use   Smoking Status Never    Passive exposure: Never   Smokeless Tobacco Never     Alcohol Consumption:  Social History     Substance and Sexual Activity   Alcohol Use No       Fall Risk Screen  STEADI Fall Risk Assessment has not been completed.    Depression Screening   The PHQ has not been completed during this encounter.     Health Habits and Functional and Cognitive " Screenin/5/2025     3:00 PM   Functional & Cognitive Status   Do you have difficulty preparing food and eating? No   Do you have difficulty bathing yourself, getting dressed or grooming yourself? No   Do you have difficulty using the toilet? No   Do you have difficulty moving around from place to place? No   Do you have trouble with steps or getting out of a bed or a chair? No   Current Diet Well Balanced Diet   Dental Exam Up to date   Eye Exam Up to date   Exercise (times per week) 0 times per week   Current Exercises Include No Regular Exercise   Do you need help using the phone?  No   Are you deaf or do you have serious difficulty hearing?  No   Do you need help to go to places out of walking distance? No   Do you need help shopping? No   Do you need help preparing meals?  No   Do you need help with housework?  No   Do you need help with laundry? No   Do you need help taking your medications? No   Do you need help managing money? No   Do you ever drive or ride in a car without wearing a seat belt? No   Have you felt unusual stress, anger or loneliness in the last month? No   Who do you live with? Other   If you need help, do you have trouble finding someone available to you? No   Have you been bothered in the last four weeks by sexual problems? No   Do you have difficulty concentrating, remembering or making decisions? No           Visual Acuity:  Vision Screening    Right eye Left eye Both eyes   Without correction 20/20 20/30 20/20   With correction        Age-appropriate Screening Schedule:  Refer to the list below for future screening recommendations based on patient's age, sex and/or medical conditions. Orders for these recommended tests are listed in the plan section. The patient has been provided with a written plan.    Health Maintenance List  Health Maintenance   Topic Date Due    DIABETIC FOOT EXAM  2025    DIABETIC EYE EXAM  2025    URINE MICROALBUMIN-CREATININE RATIO (uACR)   08/31/2025    ZOSTER VACCINE (1 of 2) 06/19/2025 (Originally 5/12/2010)    TDAP/TD VACCINES (1 - Tdap) 06/24/2025 (Originally 5/12/1979)    COVID-19 Vaccine (4 - 2024-25 season) 12/05/2025 (Originally 9/1/2024)    Pneumococcal Vaccine 50+ (1 of 2 - PCV) 12/22/2025 (Originally 5/12/1979)    INFLUENZA VACCINE  07/01/2025    HEMOGLOBIN A1C  11/02/2025    LIPID PANEL  05/30/2026    ANNUAL WELLNESS VISIT  06/05/2026    COLORECTAL CANCER SCREENING  08/23/2028    HEPATITIS C SCREENING  Discontinued                                                                                                                                                CMS Preventative Services Quick Reference  Risk Factors Identified During Encounter  None Identified    The above risks/problems have been discussed with the patient.  Pertinent information has been shared with the patient in the After Visit Summary.  An After Visit Summary and PPPS were made available to the patient.    Follow Up:   Next Medicare Wellness visit to be scheduled in 1 year.         Additional E&M Note during same encounter follows:  Patient has additional, significant, and separately identifiable condition(s)/problem(s) that require work above and beyond the Medicare Wellness Visit     Chief Complaint  WELCOME TO MEDICARE  (EKG DONE BY CARDIOLOGY/VISION DONE ), Hypertension, and Hyperlipidemia (MED REFILL DUE)    Subjective   HPI  Chemo is also being seen today for additional medical problem/s.    Review of Systems   Constitutional:  Negative for chills and fever.   HENT:  Negative for congestion, ear pain and sinus pressure.    Eyes:  Negative for pain and visual disturbance.   Respiratory:  Negative for cough and shortness of breath.    Cardiovascular:  Negative for chest pain.   Gastrointestinal:  Negative for abdominal pain.   Genitourinary:  Negative for difficulty urinating and dysuria.   Skin: Negative.    Neurological: Negative.    Psychiatric/Behavioral:   "Negative for dysphoric mood.               Objective   Vital Signs:  /62   Pulse 76   Temp 97.9 °F (36.6 °C) (Oral)   Resp 16   Ht 177.8 cm (70\")   Wt 88.8 kg (195 lb 12.8 oz)   SpO2 94%   BMI 28.09 kg/m²   Physical Exam  Vitals and nursing note reviewed.   Constitutional:       General: He is not in acute distress.     Appearance: He is well-developed.   Cardiovascular:      Rate and Rhythm: Normal rate and regular rhythm.   Pulmonary:      Effort: Pulmonary effort is normal.      Breath sounds: Normal breath sounds.   Neurological:      Mental Status: He is alert and oriented to person, place, and time.   Psychiatric:         Behavior: Behavior normal.         Thought Content: Thought content normal.     Labs reviewed with pt today during visit. All questions answered.                 Assessment and Plan Additional age appropriate preventative wellness advice topics were discussed during today's preventative wellness exam(some topics already addressed during AWV portion of the note above):    Physical Activity: Advised cardiovascular activity 150 minutes per week as tolerated. (example brisk walk for 30 minutes, 5 days a week).     Nutrition: Discussed nutrition plan with patient. Information shared in after visit summary. Goal is for a well balanced diet to enhance overall health.     Welcome to Medicare preventive visit         Essential (primary) hypertension  Hypertension is stable and controlled  Continue current treatment regimen.  Blood pressure will be reassessed in 6 months.    Orders:    chlorthalidone (HYGROTON) 25 MG tablet; Take 1 tablet by mouth Daily.    valsartan (DIOVAN) 40 MG tablet; Take 1 tablet by mouth Daily.    Pure hypercholesterolemia   Lipid abnormalities are stable    Plan:  Continue same medication/s without change.      Counseled patient on lifestyle modifications to help control hyperlipidemia.     Patient Treatment Goals:   LDL goal is less than 70    Followup in 6 " months.    Orders:    ezetimibe (ZETIA) 10 MG tablet; Take 1 tablet by mouth Daily.    rosuvastatin (CRESTOR) 40 MG tablet; Take 1 tablet by mouth Daily.    Controlled type 2 diabetes mellitus without complication, with long-term current use of insulin      Orders:    Ambulatory Referral to Endocrinology          I spent 15 minutes caring for Chemo on this date of service. This time includes time spent by me in the following activities:preparing for the visit, reviewing tests, performing a medically appropriate examination and/or evaluation , ordering medications, tests, or procedures, documenting information in the medical record, and independently interpreting results and communicating that information with the patient/family/caregiver  Follow Up   Return in about 6 months (around 12/5/2025) for Recheck.  Patient was given instructions and counseling regarding his condition or for health maintenance advice. Please see specific information pulled into the AVS if appropriate.

## 2025-06-05 NOTE — PATIENT INSTRUCTIONS
Medicare Wellness  Personal Prevention Plan of Service     Date of Office Visit:    Encounter Provider:  Roly Barreto MD  Place of Service:  [facility]  Patient Name: Chemo Lin  :  1960    As part of the Medicare Wellness portion of your visit today, we are providing you with this personalized preventive plan of services (PPPS). This plan is based upon recommendations of the [place] Preventive Services Task Force (USPSTF) and the Advisory Committee on Immunization Practices (ACIP).    This lists the preventive care services that should be considered, and provides dates of when you are due. Items listed as completed are up-to-date and do not require any further intervention.    Health Maintenance   Topic Date Due    DIABETIC FOOT EXAM  2025    DIABETIC EYE EXAM  2025    URINE MICROALBUMIN-CREATININE RATIO (uACR)  2025    ZOSTER VACCINE (1 of 2) 2025 (Originally 2010)    TDAP/TD VACCINES (1 - Tdap) 2025 (Originally 1979)    COVID-19 Vaccine (4 - -25 season) 2025 (Originally 2024)    Pneumococcal Vaccine 50+ (1 of 2 - PCV) 2025 (Originally 1979)    INFLUENZA VACCINE  2025    HEMOGLOBIN A1C  2025    LIPID PANEL  2026    ANNUAL WELLNESS VISIT  2026    COLORECTAL CANCER SCREENING  2028    HEPATITIS C SCREENING  Discontinued       No orders of the defined types were placed in this encounter.      Return in about 6 months (around 2025) for Recheck.

## 2025-06-20 ENCOUNTER — SPECIALTY PHARMACY (OUTPATIENT)
Dept: PHARMACY | Facility: TELEHEALTH | Age: 65
End: 2025-06-20
Payer: MEDICARE

## 2025-06-20 NOTE — PROGRESS NOTES
Specialty Pharmacy Patient Management Program  Refill Outreach     Chemo was contacted today regarding refills of their medication(s).    Refill Questions      Flowsheet Row Most Recent Value   Changes to allergies? No   Changes to medications? No   New conditions or infections since last clinic visit No   Unplanned office visit, urgent care, ED, or hospital admission in the last 4 weeks  No   How does patient/caregiver feel medication is working? Very good   Financial problems or insurance changes  No   Since the previous refill, were any specialty medication doses or scheduled injections missed or delayed?  No   Does this patient require a clinical escalation to a pharmacist? No            Delivery Questions      Flowsheet Row Most Recent Value   Delivery method UPS   Delivery address verified with patient/caregiver? Yes   Delivery address Home   Number of medications in delivery 2   Medication(s) being filled and delivered Evolocumab (REPATHA), glipiZIDE (GLUCOTROL XL)   Doses left of specialty medications 1-injects 6/21   Copay verified? Yes   Copay amount $50 total for both $30 for repatha   Copay form of payment Credit/debit on file   Delivery Date Selection 06/24/25   Signature Required No   Do you consent to receive electronic handouts?  Yes                 Follow-up: 21 day(s)     Gracie Whitley, Pharmacy Technician  6/20/2025  10:09 EDT

## 2025-07-18 ENCOUNTER — SPECIALTY PHARMACY (OUTPATIENT)
Dept: PHARMACY | Facility: TELEHEALTH | Age: 65
End: 2025-07-18
Payer: MEDICARE

## 2025-07-18 NOTE — PROGRESS NOTES
Specialty Pharmacy Patient Management Program  Refill Outreach     Chemo was contacted today regarding refills of their medication(s).    Refill Questions      Flowsheet Row Most Recent Value   Changes to allergies? No   Changes to medications? No   New conditions or infections since last clinic visit No   Unplanned office visit, urgent care, ED, or hospital admission in the last 4 weeks  No   How does patient/caregiver feel medication is working? Very good   Financial problems or insurance changes  No   Since the previous refill, were any specialty medication doses or scheduled injections missed or delayed?  No   Does this patient require a clinical escalation to a pharmacist? No            Delivery Questions      Flowsheet Row Most Recent Value   Delivery method UPS   Delivery address verified with patient/caregiver? Yes   Delivery address Home   Number of medications in delivery 1   Medication(s) being filled and delivered Evolocumab (Repatha SureClick)   Doses left of specialty medications 1-inject 7/19   Copay verified? Yes   Copay amount $60 for 84ds   Copay form of payment Credit/debit on file   Delivery Date Selection 07/22/25   Signature Required No   Do you consent to receive electronic handouts?  Yes                 Follow-up: 70 day(s)     Gracie Whitley, Pharmacy Technician  7/18/2025  09:48 EDT

## 2025-08-11 ENCOUNTER — OFFICE VISIT (OUTPATIENT)
Dept: ENDOCRINOLOGY | Age: 65
End: 2025-08-11
Payer: MEDICARE

## 2025-08-11 VITALS
SYSTOLIC BLOOD PRESSURE: 124 MMHG | HEART RATE: 81 BPM | OXYGEN SATURATION: 95 % | WEIGHT: 193.4 LBS | DIASTOLIC BLOOD PRESSURE: 76 MMHG | HEIGHT: 70 IN | BODY MASS INDEX: 27.69 KG/M2

## 2025-08-11 DIAGNOSIS — E11.65 UNCONTROLLED TYPE 2 DIABETES MELLITUS WITH HYPERGLYCEMIA: ICD-10-CM

## (undated) DEVICE — SOL ISO/ALC RUB 70PCT 4OZ

## (undated) DEVICE — 3M™ STERI-DRAPE™ U-DRAPE 1015: Brand: STERI-DRAPE™

## (undated) DEVICE — DRAPE,REIN 53X77,STERILE: Brand: MEDLINE

## (undated) DEVICE — SUT VIC 3/0 SH CR8 18IN J864D

## (undated) DEVICE — DRSNG TELFA PAD NONADH STR 1S 3X8IN

## (undated) DEVICE — SINGLE-USE BIOPSY FORCEPS: Brand: RADIAL JAW 4

## (undated) DEVICE — GOWN ,SIRUS,NONREINFORCED SMALL: Brand: MEDLINE

## (undated) DEVICE — ADAPT DB SPIKE 2PCT FOR AR6400 TBG

## (undated) DEVICE — INTENT TO BE USED WITH SUTURE MATERIAL FOR TISSUE CLOSURE: Brand: RICHARD-ALLAN® NEEDLE 1/2 CIRCLE TAPER

## (undated) DEVICE — BOWL PLSTC LG 32OZ BLU STRL

## (undated) DEVICE — TUBING, SUCTION, 1/4" X 10', STRAIGHT: Brand: MEDLINE

## (undated) DEVICE — LN SMPL CO2 SHTRM SD STREAM W/M LUER

## (undated) DEVICE — SKIN PREP TRAY W/CHG: Brand: MEDLINE INDUSTRIES, INC.

## (undated) DEVICE — WEREWOLF FLOW 90 COBLATION WAND: Brand: COBLATION

## (undated) DEVICE — LAPAROSCOPIC SMOKE ELIMINATION DEVICE: Brand: PNEUVIEW XE

## (undated) DEVICE — SUT PDS 0 CT1 36IN Z346H

## (undated) DEVICE — DISPOSABLE MONOPOLAR ENDOSCOPIC CORD 10 FT. (3M): Brand: KIRWAN

## (undated) DEVICE — CATH IV INSYTE AUTOGARD 14G 1 1/2IN ORNG

## (undated) DEVICE — DRSNG GZ PETROLTM XEROFORM CURAD 1X8IN STRL

## (undated) DEVICE — GLV SURG NEOPRENE SENSICARE PF SZ7.5 LF STRL

## (undated) DEVICE — EXTENSION SET, MALE LUER LOCK ADAPTER WITH RETRACTABLE COLLAR

## (undated) DEVICE — ENDOPOUCH RETRIEVER SPECIMEN RETRIEVAL BAGS: Brand: ENDOPOUCH RETRIEVER

## (undated) DEVICE — PATIENT RETURN ELECTRODE, SINGLE-USE, CONTACT QUALITY MONITORING, ADULT, WITH 9FT CORD, FOR PATIENTS WEIGING OVER 33LBS. (15KG): Brand: MEGADYNE

## (undated) DEVICE — WRAP SHOULDER COLD THERAPY

## (undated) DEVICE — CATH CHOLANG 4.5F18IN BRGNDY

## (undated) DEVICE — ST TB GOFLO STRL

## (undated) DEVICE — GLV SURG BIOGEL LTX PF 6

## (undated) DEVICE — PENCL E/S ULTRAVAC TELESCP NOSE HOLSTR 10FT

## (undated) DEVICE — LOU LAP CHOLE: Brand: MEDLINE INDUSTRIES, INC.

## (undated) DEVICE — 3M™ STERI-STRIP™ REINFORCED ADHESIVE SKIN CLOSURES, R1547, 1/2 IN X 4 IN (12 MM X 100 MM), 6 STRIPS/ENVELOPE: Brand: 3M™ STERI-STRIP™

## (undated) DEVICE — 3M™ MEDIPORE™ H SOFT CLOTH SURGICAL TAPE 2864, 4 INCH X 10 YARD (10CM X 9,14M), 12 ROLLS/CASE: Brand: 3M™ MEDIPORE™

## (undated) DEVICE — T-MAX DISPOSABLE FACE MASK 8 PER BOX

## (undated) DEVICE — CANN O2 ETCO2 FITS ALL CONN CO2 SMPL A/ 7IN DISP LF

## (undated) DEVICE — TOWEL,OR,DSP,ST,BLUE,STD,4/PK,20PK/CS: Brand: MEDLINE

## (undated) DEVICE — ENDOPATH PNEUMONEEDLE INSUFFLATION NEEDLES WITH LUER LOCK CONNECTORS 120MM: Brand: ENDOPATH

## (undated) DEVICE — SUT VIC 0 TN 27IN DYED JTN0G

## (undated) DEVICE — FIRSTPASS ST SUTURE PASSER, SELF CAPTURE: Brand: FIRSTPASS

## (undated) DEVICE — SUT MNCRYL 4/0 PS2 18 IN

## (undated) DEVICE — NDL HYPO SFTY GLD 22G 1 1/2IN

## (undated) DEVICE — GLV SURG SENSICARE PI PF LF 7 GRN STRL

## (undated) DEVICE — SUT VIC 5/0 PS2 18IN J495H

## (undated) DEVICE — ADAPT CLN BIOGUARD AIR/H2O DISP

## (undated) DEVICE — GLV SURG SENSICARE PI LF PF 7.0

## (undated) DEVICE — SUT ETHLN 3/0 PS2 18 IN 1669H

## (undated) DEVICE — SYR LUERLOK 20CC BX/50

## (undated) DEVICE — DECANTER: Brand: UNBRANDED

## (undated) DEVICE — ADHS SKIN SURG TISS VISC PREMIERPRO EXOFIN HI/VISC FAST/DRY

## (undated) DEVICE — 4.5 FULL RADIUS BONECUTTER BLADES,                                    YELLOW, 8000 MAXIMUM RPM, PACKAGED 6                                    PER BOX, STERILE

## (undated) DEVICE — CONTAINER,SPECIMEN,OR STERILE,4OZ: Brand: MEDLINE

## (undated) DEVICE — SHOULDER STABILIZATION KIT,                                    DISPOSABLE 12 PER BOX

## (undated) DEVICE — GLV SURG PREMIERPRO ORTHO LTX PF SZ7.5 BRN

## (undated) DEVICE — DECANT BG O JET

## (undated) DEVICE — PREP SOL POVIDONE/IODINE BT 4OZ

## (undated) DEVICE — APPL CHLORAPREP HI/LITE 26ML ORNG

## (undated) DEVICE — SENSR O2 OXIMAX FNGR A/ 18IN NONSTR

## (undated) DEVICE — CANNULA THREADED FLEX 8.0 X 72MM: Brand: CLEAR-TRAC

## (undated) DEVICE — DRSNG SURESITE WNDW 4X4.5

## (undated) DEVICE — 1010 S-DRAPE TOWEL DRAPE 10/BX: Brand: STERI-DRAPE™

## (undated) DEVICE — Q-FIX REUSABLE 2.8MM PATHFINDER OBTURATOR: Brand: Q-FIX

## (undated) DEVICE — SPNG GZ WOVN 4X4IN 12PLY 10/BX STRL

## (undated) DEVICE — PK SHLDR ARTHSCP 90

## (undated) DEVICE — ENDOPATH XCEL BLADELESS TROCARS WITH STABILITY SLEEVES: Brand: ENDOPATH XCEL

## (undated) DEVICE — ENDOCUT SCISSOR TIP, DISPOSABLE: Brand: RENEW

## (undated) DEVICE — KT ORCA ORCAPOD DISP STRL

## (undated) DEVICE — SOL NACL 0.9PCT 1000ML

## (undated) DEVICE — CANNULA THREADED FLEX 5.5 X 72MM: Brand: CLEAR-TRAC

## (undated) DEVICE — STPCK 3WY D201 DISCOFIX

## (undated) DEVICE — SYR LUERLOK 50ML